# Patient Record
Sex: FEMALE | Race: WHITE | Employment: OTHER | URBAN - NONMETROPOLITAN AREA
[De-identification: names, ages, dates, MRNs, and addresses within clinical notes are randomized per-mention and may not be internally consistent; named-entity substitution may affect disease eponyms.]

---

## 2017-07-12 ENCOUNTER — HOSPITAL ENCOUNTER (OUTPATIENT)
Dept: LAB | Age: 72
Discharge: HOME | End: 2017-07-12
Attending: INTERNAL MEDICINE | Admitting: INTERNAL MEDICINE

## 2017-07-12 LAB
ANION GAP SERPL CALCULATED.3IONS-SCNC: 12 MMOL/L (ref 4–16)
BUN BLDV-MCNC: 10 MG/DL (ref 6–23)
CALCIUM SERPL-MCNC: 9.3 MG/DL (ref 8.3–10.6)
CHLORIDE BLD-SCNC: 103 MMOL/L (ref 99–110)
CHOLESTEROL, FASTING: 118 MG/DL
CO2: 26 MMOL/L (ref 21–32)
CREAT SERPL-MCNC: 0.7 MG/DL (ref 0.6–1.1)
ESTIMATED AVERAGE GLUCOSE: 126 MG/DL
GFR AFRICAN AMERICAN: >60 ML/MIN/1.73M2
GFR NON-AFRICAN AMERICAN: >60 ML/MIN/1.73M2
GLUCOSE FASTING: 131 MG/DL (ref 70–99)
HBA1C MFR BLD: 6 % (ref 4.2–6.3)
HDLC SERPL-MCNC: 49 MG/DL
LDL CHOLESTEROL DIRECT: 57 MG/DL
POTASSIUM SERPL-SCNC: 3.9 MMOL/L (ref 3.5–5.1)
SODIUM BLD-SCNC: 141 MMOL/L (ref 135–145)
TRIGLYCERIDE, FASTING: 147 MG/DL
TSH HIGH SENSITIVITY: 3.75 UIU/ML (ref 0.27–4.2)
URIC ACID: 6.5 MG/DL (ref 2.6–6)

## 2017-09-11 ENCOUNTER — OFFICE VISIT (OUTPATIENT)
Dept: FAMILY MEDICINE CLINIC | Age: 72
End: 2017-09-11

## 2017-09-11 VITALS
WEIGHT: 274 LBS | HEIGHT: 70 IN | SYSTOLIC BLOOD PRESSURE: 138 MMHG | OXYGEN SATURATION: 99 % | HEART RATE: 66 BPM | RESPIRATION RATE: 15 BRPM | BODY MASS INDEX: 39.22 KG/M2 | DIASTOLIC BLOOD PRESSURE: 84 MMHG

## 2017-09-11 DIAGNOSIS — E89.0 HISTORY OF THYROIDECTOMY, SUBTOTAL: ICD-10-CM

## 2017-09-11 DIAGNOSIS — Z11.59 NEED FOR HEPATITIS C SCREENING TEST: ICD-10-CM

## 2017-09-11 DIAGNOSIS — F33.41 RECURRENT MAJOR DEPRESSIVE DISORDER, IN PARTIAL REMISSION (HCC): ICD-10-CM

## 2017-09-11 DIAGNOSIS — Z23 NEED FOR PROPHYLACTIC VACCINATION AGAINST STREPTOCOCCUS PNEUMONIAE (PNEUMOCOCCUS): ICD-10-CM

## 2017-09-11 DIAGNOSIS — Z13.820 OSTEOPOROSIS SCREENING: ICD-10-CM

## 2017-09-11 DIAGNOSIS — I10 ESSENTIAL HYPERTENSION: Primary | ICD-10-CM

## 2017-09-11 DIAGNOSIS — Z86.010 HISTORY OF COLON POLYPS: ICD-10-CM

## 2017-09-11 DIAGNOSIS — E03.9 ACQUIRED HYPOTHYROIDISM: ICD-10-CM

## 2017-09-11 PROCEDURE — 90670 PCV13 VACCINE IM: CPT | Performed by: FAMILY MEDICINE

## 2017-09-11 PROCEDURE — G0009 ADMIN PNEUMOCOCCAL VACCINE: HCPCS | Performed by: FAMILY MEDICINE

## 2017-09-11 PROCEDURE — 99203 OFFICE O/P NEW LOW 30 MIN: CPT | Performed by: FAMILY MEDICINE

## 2017-09-11 RX ORDER — VENLAFAXINE HYDROCHLORIDE 75 MG/1
75 CAPSULE, EXTENDED RELEASE ORAL DAILY
COMMUNITY
End: 2017-10-12 | Stop reason: SDUPTHER

## 2017-09-11 RX ORDER — ACETAMINOPHEN 160 MG
2000 TABLET,DISINTEGRATING ORAL EVERY MORNING
COMMUNITY

## 2017-09-11 RX ORDER — LEVOTHYROXINE SODIUM 0.12 MG/1
125 TABLET ORAL DAILY
Qty: 90 TABLET | Refills: 3 | Status: SHIPPED | OUTPATIENT
Start: 2017-09-11 | End: 2018-09-14 | Stop reason: SDUPTHER

## 2017-09-11 ASSESSMENT — ENCOUNTER SYMPTOMS
VOMITING: 0
SHORTNESS OF BREATH: 0
SORE THROAT: 0
WHEEZING: 0
DIARRHEA: 0
SINUS PRESSURE: 0
COUGH: 0
ABDOMINAL PAIN: 0
BLOOD IN STOOL: 0
BACK PAIN: 0
NAUSEA: 0
CONSTIPATION: 0
CHEST TIGHTNESS: 0
RHINORRHEA: 0

## 2017-09-11 ASSESSMENT — PATIENT HEALTH QUESTIONNAIRE - PHQ9
SUM OF ALL RESPONSES TO PHQ QUESTIONS 1-9: 2
1. LITTLE INTEREST OR PLEASURE IN DOING THINGS: 1
SUM OF ALL RESPONSES TO PHQ9 QUESTIONS 1 & 2: 2
2. FEELING DOWN, DEPRESSED OR HOPELESS: 1

## 2017-10-05 DIAGNOSIS — Z78.0 POST-MENOPAUSAL: Primary | ICD-10-CM

## 2017-10-12 DIAGNOSIS — F33.41 RECURRENT MAJOR DEPRESSIVE DISORDER, IN PARTIAL REMISSION (HCC): ICD-10-CM

## 2017-10-13 RX ORDER — VENLAFAXINE HYDROCHLORIDE 75 MG/1
75 CAPSULE, EXTENDED RELEASE ORAL DAILY
Qty: 90 CAPSULE | Refills: 3 | Status: SHIPPED | OUTPATIENT
Start: 2017-10-13 | End: 2018-10-12 | Stop reason: SDUPTHER

## 2017-10-13 RX ORDER — CARVEDILOL PHOSPHATE 10 MG/1
12.5 CAPSULE, EXTENDED RELEASE ORAL 2 TIMES DAILY
Qty: 180 CAPSULE | Refills: 3 | Status: CANCELLED | OUTPATIENT
Start: 2017-10-13

## 2017-10-13 RX ORDER — CARVEDILOL 12.5 MG/1
12.5 TABLET ORAL 2 TIMES DAILY
Qty: 60 TABLET | Refills: 3 | Status: SHIPPED | OUTPATIENT
Start: 2017-10-13 | End: 2018-01-15 | Stop reason: SDUPTHER

## 2017-10-18 ENCOUNTER — HOSPITAL ENCOUNTER (OUTPATIENT)
Dept: MAMMOGRAPHY | Age: 72
Discharge: HOME | End: 2017-10-18
Attending: FAMILY MEDICINE | Admitting: FAMILY MEDICINE

## 2017-10-18 DIAGNOSIS — Z78.0 POST-MENOPAUSAL: ICD-10-CM

## 2017-10-18 DIAGNOSIS — Z78.0 ASYMPTOMATIC MENOPAUSAL STATE: ICD-10-CM

## 2017-12-28 RX ORDER — SIMVASTATIN 20 MG
20 TABLET ORAL NIGHTLY
Qty: 90 TABLET | Refills: 3 | Status: SHIPPED | OUTPATIENT
Start: 2017-12-28 | End: 2019-02-20 | Stop reason: SDUPTHER

## 2018-01-15 ENCOUNTER — OFFICE VISIT (OUTPATIENT)
Dept: FAMILY MEDICINE CLINIC | Age: 73
End: 2018-01-15

## 2018-01-15 VITALS
HEART RATE: 68 BPM | OXYGEN SATURATION: 98 % | BODY MASS INDEX: 38.65 KG/M2 | WEIGHT: 269.4 LBS | DIASTOLIC BLOOD PRESSURE: 80 MMHG | RESPIRATION RATE: 16 BRPM | SYSTOLIC BLOOD PRESSURE: 142 MMHG

## 2018-01-15 DIAGNOSIS — Z11.59 NEED FOR HEPATITIS C SCREENING TEST: ICD-10-CM

## 2018-01-15 DIAGNOSIS — F33.41 RECURRENT MAJOR DEPRESSIVE DISORDER, IN PARTIAL REMISSION (HCC): ICD-10-CM

## 2018-01-15 DIAGNOSIS — I10 ESSENTIAL HYPERTENSION: ICD-10-CM

## 2018-01-15 DIAGNOSIS — M54.50 CHRONIC BILATERAL LOW BACK PAIN WITHOUT SCIATICA: ICD-10-CM

## 2018-01-15 DIAGNOSIS — Z86.010 HISTORY OF COLON POLYPS: ICD-10-CM

## 2018-01-15 DIAGNOSIS — I10 ESSENTIAL HYPERTENSION: Primary | ICD-10-CM

## 2018-01-15 DIAGNOSIS — E89.0 HISTORY OF THYROIDECTOMY, SUBTOTAL: ICD-10-CM

## 2018-01-15 DIAGNOSIS — K59.1 FUNCTIONAL DIARRHEA: ICD-10-CM

## 2018-01-15 DIAGNOSIS — Z23 NEEDS FLU SHOT: ICD-10-CM

## 2018-01-15 DIAGNOSIS — Z12.39 BREAST CANCER SCREENING: ICD-10-CM

## 2018-01-15 DIAGNOSIS — G89.29 CHRONIC BILATERAL LOW BACK PAIN WITHOUT SCIATICA: ICD-10-CM

## 2018-01-15 DIAGNOSIS — E03.9 ACQUIRED HYPOTHYROIDISM: ICD-10-CM

## 2018-01-15 LAB
A/G RATIO: 1.8 (ref 1.1–2.2)
ALBUMIN SERPL-MCNC: 4.6 G/DL (ref 3.4–5)
ALP BLD-CCNC: 110 U/L (ref 40–129)
ALT SERPL-CCNC: 38 U/L (ref 10–40)
ANION GAP SERPL CALCULATED.3IONS-SCNC: 17 MMOL/L (ref 3–16)
AST SERPL-CCNC: 43 U/L (ref 15–37)
BILIRUB SERPL-MCNC: 0.6 MG/DL (ref 0–1)
BUN BLDV-MCNC: 12 MG/DL (ref 7–20)
CALCIUM SERPL-MCNC: 9.9 MG/DL (ref 8.3–10.6)
CHLORIDE BLD-SCNC: 102 MMOL/L (ref 99–110)
CO2: 27 MMOL/L (ref 21–32)
CREAT SERPL-MCNC: 0.6 MG/DL (ref 0.6–1.2)
GFR AFRICAN AMERICAN: >60
GFR NON-AFRICAN AMERICAN: >60
GLOBULIN: 2.5 G/DL
GLUCOSE BLD-MCNC: 113 MG/DL (ref 70–99)
POTASSIUM SERPL-SCNC: 4.5 MMOL/L (ref 3.5–5.1)
SODIUM BLD-SCNC: 146 MMOL/L (ref 136–145)
TOTAL PROTEIN: 7.1 G/DL (ref 6.4–8.2)

## 2018-01-15 PROCEDURE — 99214 OFFICE O/P EST MOD 30 MIN: CPT | Performed by: FAMILY MEDICINE

## 2018-01-15 PROCEDURE — 90686 IIV4 VACC NO PRSV 0.5 ML IM: CPT | Performed by: FAMILY MEDICINE

## 2018-01-15 PROCEDURE — G0008 ADMIN INFLUENZA VIRUS VAC: HCPCS | Performed by: FAMILY MEDICINE

## 2018-01-15 RX ORDER — BACLOFEN 10 MG/1
10 TABLET ORAL NIGHTLY PRN
Qty: 20 TABLET | Refills: 1 | Status: SHIPPED | OUTPATIENT
Start: 2018-01-15 | End: 2018-05-15 | Stop reason: SDUPTHER

## 2018-01-15 RX ORDER — CARVEDILOL 12.5 MG/1
12.5 TABLET ORAL 2 TIMES DAILY
Qty: 180 TABLET | Refills: 3 | Status: SHIPPED | OUTPATIENT
Start: 2018-01-15 | End: 2019-01-14 | Stop reason: SDUPTHER

## 2018-01-15 ASSESSMENT — ENCOUNTER SYMPTOMS
WHEEZING: 0
ABDOMINAL PAIN: 0
CHEST TIGHTNESS: 0
DIARRHEA: 1
BACK PAIN: 0
SINUS PRESSURE: 0
COUGH: 0
NAUSEA: 0
RHINORRHEA: 0
SHORTNESS OF BREATH: 0
CONSTIPATION: 0
SORE THROAT: 0
BLOOD IN STOOL: 0
VOMITING: 0

## 2018-01-16 LAB — HEPATITIS C ANTIBODY INTERPRETATION: NORMAL

## 2018-01-16 NOTE — ASSESSMENT & PLAN NOTE
BP (!) 142/80   Pulse 68   Resp 16   Wt 269 lb 6.4 oz (122.2 kg)   SpO2 98%   BMI 38.65 kg/m²   Discussed diet/exercise vs meds    The patient is asked to make an attempt to improve diet and exercise patterns to aid in medical management of this problem.

## 2018-01-16 NOTE — PROGRESS NOTES
immunocompromised state. Neurological: Negative for weakness, numbness and headaches. Hematological: Negative. Psychiatric/Behavioral: Negative. OBJECTIVE  PHYSICAL EXAM:    BP (!) 142/80   Pulse 68   Resp 16   Wt 269 lb 6.4 oz (122.2 kg)   SpO2 98%   BMI 38.65 kg/m²       Physical Exam   Constitutional: She is oriented to person, place, and time. She appears well-developed and well-nourished. HENT:   Head: Normocephalic and atraumatic. Right Ear: External ear normal.   Left Ear: External ear normal.   Nose: Nose normal.   Mouth/Throat: Oropharynx is clear and moist.   Eyes: Conjunctivae and EOM are normal. Pupils are equal, round, and reactive to light. Neck: Normal range of motion. Neck supple. No JVD present. No tracheal deviation present. No thyromegaly present. Cardiovascular: Normal rate, regular rhythm, normal heart sounds and intact distal pulses. Pulmonary/Chest: Effort normal and breath sounds normal. She has no wheezes. She has no rales. Abdominal: Soft. Bowel sounds are normal. She exhibits no mass. Musculoskeletal: Normal range of motion. She exhibits no edema. Lymphadenopathy:     She has no cervical adenopathy. Neurological: She is alert and oriented to person, place, and time. She has normal reflexes. Skin: Skin is warm and dry. No rash noted. Psychiatric: She has a normal mood and affect. Her behavior is normal. Judgment and thought content normal.       ASSESSMENT:    1. Essential hypertension    2. Acquired hypothyroidism    3. Recurrent major depressive disorder, in partial remission ([address]    4. Chronic bilateral low back pain without sciatica    5. History of colon polyps    6. Functional diarrhea    7. Breast cancer screening    8. Needs flu shot    9. Need for hepatitis C screening test    10. History of thyroidectomy, subtotal        PLAN:    Georges  was seen today for other and back pain.     Diagnoses and all orders for this visit:    Essential

## 2018-01-25 ENCOUNTER — TELEPHONE (OUTPATIENT)
Dept: GASTROENTEROLOGY | Age: 73
End: 2018-01-25

## 2018-01-25 ENCOUNTER — INITIAL CONSULT (OUTPATIENT)
Dept: GASTROENTEROLOGY | Age: 73
End: 2018-01-25

## 2018-01-25 VITALS
DIASTOLIC BLOOD PRESSURE: 86 MMHG | HEIGHT: 70 IN | OXYGEN SATURATION: 98 % | BODY MASS INDEX: 38.63 KG/M2 | HEART RATE: 64 BPM | SYSTOLIC BLOOD PRESSURE: 130 MMHG | WEIGHT: 269.8 LBS

## 2018-01-25 DIAGNOSIS — K59.1 FUNCTIONAL DIARRHEA: Primary | ICD-10-CM

## 2018-01-25 PROCEDURE — 99203 OFFICE O/P NEW LOW 30 MIN: CPT | Performed by: INTERNAL MEDICINE

## 2018-01-25 NOTE — PROGRESS NOTES
discharge    Labs:  CBC  @LASTLABOSUSHORT(WBC,WBCCOUNT,WBCFETAL,HGB,HCT,PLATELET,MCV)@     Glucose   Date Value Ref Range Status   01/15/2018 113 (H) 70 - 99 mg/dL Final     CO2   Date Value Ref Range Status   01/15/2018 27 21 - 32 mmol/L Final     BUN   Date Value Ref Range Status   01/15/2018 12 7 - 20 mg/dL Final     Lab Results   Component Value Date    ALT 38 01/15/2018    AST 43 01/15/2018     No results found for: AMYLASE  No results found for: LIPASE  No results found for: ESR  No components found for: CREACTIVEPR  No results found for: JUANJOSE No components found for: ANTISMA, ANTIMITO  No results found for: CEA  No components found for: OCCULTBLOOD, OCCBLDSINPOC, OCCULTBLOODS, OCCBLD1, OCCBLD2, OCCBLD3, OCCULTBLOOD  No results found for: IRON, FERRITIN  No results found for: HAV    Assessment     Assessment and Plan:    A 66-year-old  female patient who has a past medical history of obesity, Hypercholesterolemia, hypertension, a personal history for colon polyps, and a family history of colorectal cancer had come to my office to follow up her history of polyps and intermittent diarrhea. 1. Intermittent diarrhea that has already resolved over past 2 or 3 weeks was most likely secondary to IBS or diet-related diarrhea. Since the patient's diarrhea  Has resolved and her stool was formed, I do not need to do stool test for infectious diarrhea. However I would recommend a colonoscopy. 2.  History of polyps and a family history colorectal cancer, I recommend a colonoscopy    3. Obesity I recommend doing exercise every day and eating a healthy diet. 4.  Mild elevated liver function tests may be secondary to medication or fatty liver or viral hepatitis. At this time I recommend liver ultrasound and the viral hepatitis panel to r/o viral hepatitis.   He was born in [address] [place] [address], therefore she needed a screening Hep C test.    I spent more than 50 minutes to discuss the pathophysiology, etiology, left side

## 2018-01-31 ENCOUNTER — HOSPITAL ENCOUNTER (OUTPATIENT)
Dept: ULTRASOUND IMAGING | Age: 73
Discharge: HOME | End: 2018-01-31
Attending: INTERNAL MEDICINE | Admitting: INTERNAL MEDICINE

## 2018-01-31 ENCOUNTER — TELEPHONE (OUTPATIENT)
Dept: GASTROENTEROLOGY | Age: 73
End: 2018-01-31

## 2018-01-31 DIAGNOSIS — R79.89 ELEVATED LFTS: ICD-10-CM

## 2018-01-31 DIAGNOSIS — Z86.010 HISTORY OF COLON POLYPS: Primary | ICD-10-CM

## 2018-01-31 DIAGNOSIS — R79.89 LFT ELEVATION: ICD-10-CM

## 2018-01-31 DIAGNOSIS — K59.1 FUNCTIONAL DIARRHEA: ICD-10-CM

## 2018-01-31 DIAGNOSIS — B15.9 VIRAL HEPATITIS A WITHOUT HEPATIC COMA: ICD-10-CM

## 2018-01-31 LAB
ALBUMIN SERPL-MCNC: 4 GM/DL (ref 3.4–5)
ALP BLD-CCNC: 108 IU/L (ref 40–129)
ALT SERPL-CCNC: 42 U/L (ref 10–40)
ANION GAP SERPL CALCULATED.3IONS-SCNC: 12 MMOL/L (ref 4–16)
AST SERPL-CCNC: 42 IU/L (ref 15–37)
BASOPHILS ABSOLUTE: 0 K/CU MM
BASOPHILS RELATIVE PERCENT: 0 % (ref 0–1)
BILIRUB SERPL-MCNC: 0.6 MG/DL (ref 0–1)
BILIRUBIN DIRECT: 0.2 MG/DL (ref 0–0.3)
BUN BLDV-MCNC: 9 MG/DL (ref 6–23)
CALCIUM SERPL-MCNC: 9.3 MG/DL (ref 8.3–10.6)
CHLORIDE BLD-SCNC: 100 MMOL/L (ref 99–110)
CO2: 29 MMOL/L (ref 21–32)
CREAT SERPL-MCNC: 0.7 MG/DL (ref 0.6–1.1)
DIFFERENTIAL TYPE: ABNORMAL
EOSINOPHILS ABSOLUTE: 0 K/CU MM
EOSINOPHILS RELATIVE PERCENT: 0 % (ref 0–3)
GFR AFRICAN AMERICAN: >60 ML/MIN/1.73M2
GFR NON-AFRICAN AMERICAN: >60 ML/MIN/1.73M2
GLUCOSE FASTING: 122 MG/DL (ref 70–99)
HAV IGM SER IA-ACNC: ABNORMAL
HBV SURFACE AB TITR SER: <3.5 {TITER}
HCT VFR BLD CALC: 43.6 % (ref 37–47)
HEMOGLOBIN: 14.6 GM/DL (ref 12.5–16)
HEPATITIS B SURFACE ANTIGEN: NON REACTIVE
HEPATITIS C ANTIBODY: NON REACTIVE
IMMATURE NEUTROPHIL %: 0.2 % (ref 0–0.43)
LYMPHOCYTES ABSOLUTE: 1.8 K/CU MM
LYMPHOCYTES RELATIVE PERCENT: 34.5 % (ref 24–44)
MCH RBC QN AUTO: 32.4 PG (ref 27–31)
MCHC RBC AUTO-ENTMCNC: 33.5 % (ref 32–36)
MCV RBC AUTO: 96.9 FL (ref 78–100)
MONOCYTES ABSOLUTE: 0.5 K/CU MM
MONOCYTES RELATIVE PERCENT: 8.8 % (ref 0–4)
PDW BLD-RTO: 12.9 % (ref 11.7–14.9)
PLATELET # BLD: 249 K/CU MM (ref 140–440)
PMV BLD AUTO: 9.8 FL (ref 7.5–11.1)
POTASSIUM SERPL-SCNC: 4.1 MMOL/L (ref 3.5–5.1)
RBC # BLD: 4.5 M/CU MM (ref 4.2–5.4)
SEGMENTED NEUTROPHILS ABSOLUTE COUNT: 2.9 K/CU MM
SEGMENTED NEUTROPHILS RELATIVE PERCENT: 56.5 % (ref 36–66)
SODIUM BLD-SCNC: 141 MMOL/L (ref 135–145)
TOTAL IMMATURE NEUTOROPHIL: 0.01 K/CU MM
TOTAL PROTEIN: 7.1 GM/DL (ref 6.4–8.2)
WBC # BLD: 5.2 K/CU MM (ref 4–10.5)

## 2018-01-31 NOTE — TELEPHONE ENCOUNTER
Patient advised of results. Verbal understanding expressed. Patient is going to have labs drawn at [facility]. I will fax the orders now.

## 2018-02-01 ENCOUNTER — HOSPITAL ENCOUNTER (OUTPATIENT)
Dept: LAB | Age: 73
Discharge: HOME | End: 2018-02-01
Attending: INTERNAL MEDICINE | Admitting: INTERNAL MEDICINE

## 2018-02-01 LAB
FERRITIN: 149 NG/ML (ref 15–150)
GAMMA GLUTAMYL TRANSFERASE: 138 IU/L (ref 5–36)
HAV AB SERPL IA-ACNC: POSITIVE
HEPATITIS B CORE TOTAL ANTIBODY: NEGATIVE
HIV SCREEN: NON REACTIVE
INR BLD: 1.12 INDEX
IRON: 63 UG/DL (ref 37–145)
PCT TRANSFERRIN: 20 % (ref 10–44)
PROTHROMBIN TIME: 12.8 SECONDS (ref 10–14.3)
TOTAL IRON BINDING CAPACITY: 314 UG/DL (ref 250–450)
UNSATURATED IRON BINDING CAPACITY: 251 UG/DL (ref 110–370)

## 2018-02-01 NOTE — TELEPHONE ENCOUNTER
Patient returned our call. She did not wish to reschedule c-scope at this time (for 3 months). When everything else gets \"straigtened out\" she will call to reschedule c-scope. Pt was reminded of checking LFT's every 2 weeks. Patient expressed understanding and had no further questions.

## 2018-02-02 LAB
CERULOPLASMIN: 25
IMMUNOGLOBULIN A, SERUM: 6

## 2018-02-03 LAB
ANA TITER: NORMAL
ANTI-MITOCHON TITER: 2.2
ANTI-NUCLEAR ANTIBODY (ANA): DETECTED
F-ACTIN AB, IGG: 18

## 2018-02-04 LAB
ALPHA-1 ANTITRYPSIN PHENOTYPE: NORMAL
ALPHA-1 ANTITRYPSIN: 151

## 2018-02-09 ENCOUNTER — TELEPHONE (OUTPATIENT)
Dept: GASTROENTEROLOGY | Age: 73
End: 2018-02-09

## 2018-02-09 NOTE — TELEPHONE ENCOUNTER
Patient called to ask if we faxed the standing order fro LFT's every 2 weeks to [facility]. I advised her that we have not faxed it yet. Jennifer Mehta can you please place standing order for patient to have LFT's checked every 2 weeks?

## 2018-02-14 ENCOUNTER — HOSPITAL ENCOUNTER (OUTPATIENT)
Dept: LAB | Age: 73
Discharge: HOME | End: 2018-02-28
Attending: INTERNAL MEDICINE | Admitting: INTERNAL MEDICINE

## 2018-02-14 LAB
ALBUMIN SERPL-MCNC: 4.1 GM/DL (ref 3.4–5)
ALP BLD-CCNC: 114 IU/L (ref 40–129)
ALT SERPL-CCNC: 47 U/L (ref 10–40)
AST SERPL-CCNC: 50 IU/L (ref 15–37)
BILIRUB SERPL-MCNC: 0.6 MG/DL (ref 0–1)
BILIRUBIN DIRECT: 0.2 MG/DL (ref 0–0.3)
BILIRUBIN, INDIRECT: 0.4 MG/DL (ref 0–0.7)
TOTAL PROTEIN: 7.4 GM/DL (ref 6.4–8.2)

## 2018-02-21 ENCOUNTER — TELEPHONE (OUTPATIENT)
Dept: GASTROENTEROLOGY | Age: 73
End: 2018-02-21

## 2018-02-28 LAB
ALBUMIN SERPL-MCNC: 4.1 GM/DL (ref 3.4–5)
ALP BLD-CCNC: 113 IU/L (ref 40–129)
ALT SERPL-CCNC: 47 U/L (ref 10–40)
AST SERPL-CCNC: 48 IU/L (ref 15–37)
BILIRUB SERPL-MCNC: 0.6 MG/DL (ref 0–1)
BILIRUBIN DIRECT: 0.2 MG/DL (ref 0–0.3)
BILIRUBIN, INDIRECT: 0.4 MG/DL (ref 0–0.7)
TOTAL PROTEIN: 7.3 GM/DL (ref 6.4–8.2)

## 2018-03-01 ENCOUNTER — HOSPITAL ENCOUNTER (OUTPATIENT)
Dept: LAB | Age: 73
Discharge: HOME | End: 2018-03-31
Attending: INTERNAL MEDICINE | Admitting: INTERNAL MEDICINE

## 2018-03-14 LAB
ALBUMIN SERPL-MCNC: 4.3 GM/DL (ref 3.4–5)
ALP BLD-CCNC: 106 IU/L (ref 40–129)
ALT SERPL-CCNC: 33 U/L (ref 10–40)
AST SERPL-CCNC: 40 IU/L (ref 15–37)
BILIRUB SERPL-MCNC: 0.7 MG/DL (ref 0–1)
BILIRUBIN DIRECT: 0.2 MG/DL (ref 0–0.3)
BILIRUBIN, INDIRECT: 0.5 MG/DL (ref 0–0.7)
TOTAL PROTEIN: 7.6 GM/DL (ref 6.4–8.2)

## 2018-03-28 LAB
ALBUMIN SERPL-MCNC: 4.2 GM/DL (ref 3.4–5)
ALP BLD-CCNC: 104 IU/L (ref 40–129)
ALT SERPL-CCNC: 32 U/L (ref 10–40)
AST SERPL-CCNC: 39 IU/L (ref 15–37)
BILIRUB SERPL-MCNC: 0.6 MG/DL (ref 0–1)
BILIRUBIN DIRECT: 0.2 MG/DL (ref 0–0.3)
BILIRUBIN, INDIRECT: 0.4 MG/DL (ref 0–0.7)
TOTAL PROTEIN: 7.5 GM/DL (ref 6.4–8.2)

## 2018-04-01 ENCOUNTER — HOSPITAL ENCOUNTER (OUTPATIENT)
Dept: LAB | Age: 73
Discharge: HOME | End: 2018-04-30
Attending: INTERNAL MEDICINE | Admitting: INTERNAL MEDICINE

## 2018-04-18 ENCOUNTER — TELEPHONE (OUTPATIENT)
Dept: GASTROENTEROLOGY | Age: 73
End: 2018-04-18

## 2018-04-18 DIAGNOSIS — R79.89 LFT ELEVATION: Primary | ICD-10-CM

## 2018-04-18 DIAGNOSIS — B15.9 VIRAL HEPATITIS A WITHOUT HEPATIC COMA: ICD-10-CM

## 2018-04-18 LAB
ALBUMIN SERPL-MCNC: 4 GM/DL (ref 3.4–5)
ALP BLD-CCNC: 124 IU/L (ref 40–129)
ALT SERPL-CCNC: 48 U/L (ref 10–40)
AST SERPL-CCNC: 53 IU/L (ref 15–37)
BILIRUB SERPL-MCNC: 0.5 MG/DL (ref 0–1)
BILIRUBIN DIRECT: 0.2 MG/DL (ref 0–0.3)
BILIRUBIN, INDIRECT: 0.3 MG/DL (ref 0–0.7)
TOTAL PROTEIN: 7 GM/DL (ref 6.4–8.2)

## 2018-04-19 ENCOUNTER — HOSPITAL ENCOUNTER (OUTPATIENT)
Dept: LAB | Age: 73
Discharge: HOME | End: 2018-04-19
Attending: INTERNAL MEDICINE | Admitting: INTERNAL MEDICINE

## 2018-04-19 LAB — HAV IGM SER IA-ACNC: ABNORMAL

## 2018-04-21 LAB
HAV AB SERPL IA-ACNC: POSITIVE
HEPATITIS E IGM AB: NEGATIVE

## 2018-05-01 ENCOUNTER — HOSPITAL ENCOUNTER (OUTPATIENT)
Dept: LAB | Age: 73
Discharge: HOME | End: 2018-05-31
Attending: INTERNAL MEDICINE | Admitting: INTERNAL MEDICINE

## 2018-05-09 LAB
ALBUMIN SERPL-MCNC: 4.1 GM/DL (ref 3.4–5)
ALP BLD-CCNC: 114 IU/L (ref 40–129)
ALT SERPL-CCNC: 41 U/L (ref 10–40)
AST SERPL-CCNC: 46 IU/L (ref 15–37)
BILIRUB SERPL-MCNC: 0.6 MG/DL (ref 0–1)
BILIRUBIN DIRECT: 0.2 MG/DL (ref 0–0.3)
BILIRUBIN, INDIRECT: 0.4 MG/DL (ref 0–0.7)
TOTAL PROTEIN: 7.4 GM/DL (ref 6.4–8.2)

## 2018-05-15 ENCOUNTER — OFFICE VISIT (OUTPATIENT)
Dept: FAMILY MEDICINE CLINIC | Age: 73
End: 2018-05-15

## 2018-05-15 VITALS
SYSTOLIC BLOOD PRESSURE: 130 MMHG | RESPIRATION RATE: 18 BRPM | DIASTOLIC BLOOD PRESSURE: 76 MMHG | HEART RATE: 70 BPM | WEIGHT: 267 LBS | BODY MASS INDEX: 38.31 KG/M2

## 2018-05-15 DIAGNOSIS — I10 ESSENTIAL HYPERTENSION: ICD-10-CM

## 2018-05-15 DIAGNOSIS — R79.89 LFT ELEVATION: ICD-10-CM

## 2018-05-15 DIAGNOSIS — Z12.11 SCREENING FOR COLON CANCER: ICD-10-CM

## 2018-05-15 DIAGNOSIS — E03.9 ACQUIRED HYPOTHYROIDISM: ICD-10-CM

## 2018-05-15 DIAGNOSIS — B15.9 VIRAL HEPATITIS A WITHOUT HEPATIC COMA: Primary | ICD-10-CM

## 2018-05-15 DIAGNOSIS — R11.0 NAUSEA: ICD-10-CM

## 2018-05-15 DIAGNOSIS — G89.29 CHRONIC BILATERAL LOW BACK PAIN WITHOUT SCIATICA: ICD-10-CM

## 2018-05-15 DIAGNOSIS — F33.41 RECURRENT MAJOR DEPRESSIVE DISORDER, IN PARTIAL REMISSION (HCC): ICD-10-CM

## 2018-05-15 DIAGNOSIS — Z23 NEED FOR PROPHYLACTIC VACCINATION AND INOCULATION AGAINST VARICELLA: ICD-10-CM

## 2018-05-15 DIAGNOSIS — Z12.31 ENCOUNTER FOR SCREENING MAMMOGRAM FOR BREAST CANCER: ICD-10-CM

## 2018-05-15 DIAGNOSIS — M54.50 CHRONIC BILATERAL LOW BACK PAIN WITHOUT SCIATICA: ICD-10-CM

## 2018-05-15 PROCEDURE — 99214 OFFICE O/P EST MOD 30 MIN: CPT | Performed by: FAMILY MEDICINE

## 2018-05-15 RX ORDER — ONDANSETRON HYDROCHLORIDE 8 MG/1
8 TABLET, FILM COATED ORAL EVERY 8 HOURS PRN
Qty: 30 TABLET | Refills: 1 | Status: SHIPPED | OUTPATIENT
Start: 2018-05-15 | End: 2018-09-06

## 2018-05-15 RX ORDER — BACLOFEN 10 MG/1
10 TABLET ORAL NIGHTLY PRN
Qty: 20 TABLET | Refills: 1 | Status: SHIPPED | OUTPATIENT
Start: 2018-05-15 | End: 2021-02-09 | Stop reason: SDUPTHER

## 2018-05-16 ENCOUNTER — PATIENT MESSAGE (OUTPATIENT)
Dept: FAMILY MEDICINE CLINIC | Age: 73
End: 2018-05-16

## 2018-05-18 ASSESSMENT — ENCOUNTER SYMPTOMS
RESPIRATORY NEGATIVE: 1
SHORTNESS OF BREATH: 0
EYES NEGATIVE: 1
VOMITING: 1
NAUSEA: 1
HEARTBURN: 0
CONSTIPATION: 0
COUGH: 0
ORTHOPNEA: 0
BLOOD IN STOOL: 0
HEMOPTYSIS: 0
ABDOMINAL PAIN: 1
DIARRHEA: 1

## 2018-05-21 ENCOUNTER — TELEPHONE (OUTPATIENT)
Dept: FAMILY MEDICINE CLINIC | Age: 73
End: 2018-05-21

## 2018-05-30 ENCOUNTER — TELEPHONE (OUTPATIENT)
Dept: GASTROENTEROLOGY | Age: 73
End: 2018-05-30

## 2018-05-30 ENCOUNTER — HOSPITAL ENCOUNTER (OUTPATIENT)
Dept: MAMMOGRAPHY | Age: 73
Discharge: HOME | End: 2018-05-30
Attending: FAMILY MEDICINE | Admitting: FAMILY MEDICINE

## 2018-05-30 DIAGNOSIS — Z12.31 ENCOUNTER FOR SCREENING MAMMOGRAM FOR BREAST CANCER: ICD-10-CM

## 2018-05-30 LAB
ALBUMIN SERPL-MCNC: 4.5 GM/DL (ref 3.4–5)
ALP BLD-CCNC: 107 IU/L (ref 40–129)
ALT SERPL-CCNC: 36 U/L (ref 10–40)
AST SERPL-CCNC: 46 IU/L (ref 15–37)
BILIRUB SERPL-MCNC: 0.8 MG/DL (ref 0–1)
BILIRUBIN DIRECT: 0.2 MG/DL (ref 0–0.3)
BILIRUBIN, INDIRECT: 0.6 MG/DL (ref 0–0.7)
TOTAL PROTEIN: 7.7 GM/DL (ref 6.4–8.2)

## 2018-06-01 ENCOUNTER — HOSPITAL ENCOUNTER (OUTPATIENT)
Dept: LAB | Age: 73
Discharge: HOME | End: 2018-06-30
Attending: INTERNAL MEDICINE | Admitting: INTERNAL MEDICINE

## 2018-06-13 ENCOUNTER — HOSPITAL ENCOUNTER (OUTPATIENT)
Dept: WOMENS IMAGING | Age: 73
Discharge: HOME | End: 2018-06-13
Attending: FAMILY MEDICINE | Admitting: FAMILY MEDICINE

## 2018-06-13 DIAGNOSIS — N64.89 BREAST ASYMMETRY: ICD-10-CM

## 2018-06-13 DIAGNOSIS — R92.8 ABNORMAL MAMMOGRAM: ICD-10-CM

## 2018-06-14 DIAGNOSIS — N63.0 BREAST MASS SEEN ON MAMMOGRAM: Primary | ICD-10-CM

## 2018-06-14 DIAGNOSIS — R92.8 OTHER ABNORMAL AND INCONCLUSIVE FINDINGS ON DIAGNOSTIC IMAGING OF BREAST: ICD-10-CM

## 2018-06-26 LAB
ALBUMIN SERPL-MCNC: 4.2 GM/DL (ref 3.4–5)
ALP BLD-CCNC: 104 IU/L (ref 40–129)
ALT SERPL-CCNC: 42 U/L (ref 10–40)
AST SERPL-CCNC: 46 IU/L (ref 15–37)
BILIRUB SERPL-MCNC: 0.6 MG/DL (ref 0–1)
BILIRUBIN DIRECT: 0.2 MG/DL (ref 0–0.3)
BILIRUBIN, INDIRECT: 0.4 MG/DL (ref 0–0.7)
TOTAL PROTEIN: 7.2 GM/DL (ref 6.4–8.2)

## 2018-07-01 ENCOUNTER — HOSPITAL ENCOUNTER (OUTPATIENT)
Dept: LAB | Age: 73
Discharge: HOME | End: 2018-07-31
Attending: INTERNAL MEDICINE | Admitting: INTERNAL MEDICINE

## 2018-07-24 ENCOUNTER — TELEPHONE (OUTPATIENT)
Dept: GASTROENTEROLOGY | Age: 73
End: 2018-07-24

## 2018-07-24 DIAGNOSIS — B15.9 VIRAL HEPATITIS A WITHOUT HEPATIC COMA: Primary | ICD-10-CM

## 2018-07-24 LAB
ALBUMIN SERPL-MCNC: 4.2 GM/DL (ref 3.4–5)
ALP BLD-CCNC: 105 IU/L (ref 40–129)
ALT SERPL-CCNC: 45 U/L (ref 10–40)
AST SERPL-CCNC: 45 IU/L (ref 15–37)
BILIRUB SERPL-MCNC: 0.6 MG/DL (ref 0–1)
BILIRUBIN DIRECT: 0.2 MG/DL (ref 0–0.3)
BILIRUBIN, INDIRECT: 0.4 MG/DL (ref 0–0.7)
TOTAL PROTEIN: 7.4 GM/DL (ref 6.4–8.2)

## 2018-07-24 NOTE — TELEPHONE ENCOUNTER
Please let her know that her LFT were stable although it was till elevated. But I think that her Hep A should have recover already. She likely had underlying fatty liver that causes her slightly abnormal LFT now. I would suggest monitor her LFT every month for the next 6 months. If they are stable we only need to check her LFT every 3 months.

## 2018-08-01 ENCOUNTER — HOSPITAL ENCOUNTER (OUTPATIENT)
Dept: LAB | Age: 73
Discharge: HOME | End: 2018-08-31
Attending: INTERNAL MEDICINE | Admitting: INTERNAL MEDICINE

## 2018-08-20 RX ORDER — SODIUM CHLORIDE, SODIUM LACTATE, POTASSIUM CHLORIDE, CALCIUM CHLORIDE 600; 310; 30; 20 MG/100ML; MG/100ML; MG/100ML; MG/100ML
INJECTION, SOLUTION INTRAVENOUS CONTINUOUS
Status: CANCELLED | OUTPATIENT
Start: 2018-08-20

## 2018-08-22 LAB
ALBUMIN SERPL-MCNC: 4.1 GM/DL (ref 3.4–5)
ALP BLD-CCNC: 99 IU/L (ref 40–129)
ALT SERPL-CCNC: 36 U/L (ref 10–40)
AST SERPL-CCNC: 48 IU/L (ref 15–37)
BILIRUB SERPL-MCNC: 0.7 MG/DL (ref 0–1)
BILIRUBIN DIRECT: 0.2 MG/DL (ref 0–0.3)
BILIRUBIN, INDIRECT: 0.5 MG/DL (ref 0–0.7)
TOTAL PROTEIN: 7.3 GM/DL (ref 6.4–8.2)

## 2018-08-27 ENCOUNTER — TELEPHONE (OUTPATIENT)
Dept: GASTROENTEROLOGY | Age: 73
End: 2018-08-27

## 2018-08-27 DIAGNOSIS — R74.8 ELEVATED LIVER ENZYMES: Primary | ICD-10-CM

## 2018-09-01 ENCOUNTER — HOSPITAL ENCOUNTER (OUTPATIENT)
Dept: LAB | Age: 73
Discharge: HOME | End: 2018-09-01
Attending: INTERNAL MEDICINE | Admitting: INTERNAL MEDICINE

## 2018-09-04 ENCOUNTER — TELEPHONE (OUTPATIENT)
Dept: GASTROENTEROLOGY | Age: 73
End: 2018-09-04

## 2018-09-04 NOTE — TELEPHONE ENCOUNTER
Called pt to confirm procedure appt on Thursday, 18. Pt stated she needed to reschedule because her mother-in-law passed away and the  is on Thursday. Pt has been rescheduled to 18 at 8:30 AM, arrival time 7:30 AM. Faxed scheduling sheet to [facility].

## 2018-09-06 ENCOUNTER — HOSPITAL ENCOUNTER (OUTPATIENT)
Dept: SURGERY | Age: 73
Discharge: HOME | End: 2018-09-06
Attending: INTERNAL MEDICINE | Admitting: INTERNAL MEDICINE

## 2018-09-06 ENCOUNTER — PAT TELEPHONE (OUTPATIENT)
Dept: SURGERY | Age: 73
End: 2018-09-06

## 2018-09-06 VITALS — WEIGHT: 250 LBS | BODY MASS INDEX: 35.79 KG/M2 | HEIGHT: 70 IN

## 2018-09-06 NOTE — PROGRESS NOTES
1. Hx of anesthesia complications? -- no  2. Hx of difficult airway/intubation? -- no  3. Hx of changed chest pain/CHF exacerbation in last 6 mo. ? -- no  4. Home O2 dependent? -- no  5. Able to climb one flight of stairs w/o SOB? -- yes  6. Recent COPD exacerbation or pneumonia/bronchitis that has been treated in last      month? -- no       1. Do not eat or drink anything after 12 midnight (or____hours) unless instructed by your doctor prior to surgery. This includes no water, chewing gum or mints. 2. Follow your directions as prescribed by the doctor for your procedure and medications. 3.Check with your Doctor regarding stopping Plavix, Coumadin, Lovenox,Effient,Pradaxa,Xarelto, Fragmin or other blood thinners and follow their instructions. 4. Do not smoke, and do not drink any alcoholic beverages 24 hours prior to surgery. This includes NA Beer. 5. You may brush your teeth and gargle the morning of surgery. DO NOT SWALLOW WATER   6. You MUST make arrangements for a responsible adult to take you home after your surgery and be able to check on you every couple hours for the day. You will not be allowed to leave alone or drive yourself home. It is strongly suggested someone stay with you the first 24 hrs. Your surgery will be cancelled if you do not have a ride home. 7. Please wear simple, loose fitting clothing to the hospital.  [place] not bring valuables (money, credit cards, checkbooks, etc.) Do not wear any makeup (including no eye makeup) or nail polish on your fingers or toes. 8. DO NOT wear any jewelry or piercings on day of surgery. All body piercing jewelry must be removed. 9. If you have dentures, they will be removed before going to the OR; we will provide you a container. If you wear contact lenses or glasses, they will be removed; please bring a case for them.              10. If you  have a Living Will and Durable Power of  for Healthcare, please bring in a

## 2018-09-07 ENCOUNTER — TELEPHONE (OUTPATIENT)
Dept: GASTROENTEROLOGY | Age: 73
End: 2018-09-07

## 2018-09-11 ENCOUNTER — HOSPITAL ENCOUNTER (OUTPATIENT)
Dept: SURGERY | Age: 73
Discharge: HOME | End: 2018-09-11
Attending: INTERNAL MEDICINE | Admitting: INTERNAL MEDICINE

## 2018-09-11 ENCOUNTER — TELEPHONE (OUTPATIENT)
Dept: GASTROENTEROLOGY | Age: 73
End: 2018-09-11

## 2018-09-11 VITALS
DIASTOLIC BLOOD PRESSURE: 81 MMHG | HEIGHT: 70 IN | RESPIRATION RATE: 16 BRPM | TEMPERATURE: 97.1 F | WEIGHT: 264 LBS | SYSTOLIC BLOOD PRESSURE: 146 MMHG | OXYGEN SATURATION: 99 % | BODY MASS INDEX: 37.8 KG/M2 | HEART RATE: 62 BPM

## 2018-09-11 DIAGNOSIS — B15.9 VIRAL HEPATITIS A WITHOUT HEPATIC COMA: Primary | ICD-10-CM

## 2018-09-11 DIAGNOSIS — K63.5 POLYP OF COLON, UNSPECIFIED PART OF COLON, UNSPECIFIED TYPE: ICD-10-CM

## 2018-09-11 DIAGNOSIS — R76.8 ANA POSITIVE: Primary | ICD-10-CM

## 2018-09-11 PROCEDURE — 45385 COLONOSCOPY W/LESION REMOVAL: CPT | Performed by: INTERNAL MEDICINE

## 2018-09-11 RX ORDER — SODIUM CHLORIDE, SODIUM LACTATE, POTASSIUM CHLORIDE, CALCIUM CHLORIDE 600; 310; 30; 20 MG/100ML; MG/100ML; MG/100ML; MG/100ML
INJECTION, SOLUTION INTRAVENOUS CONTINUOUS
Status: DISCONTINUED | OUTPATIENT
Start: 2018-09-11 | End: 2018-09-12 | Stop reason: HOSPADM

## 2018-09-11 RX ADMIN — SODIUM CHLORIDE, SODIUM LACTATE, POTASSIUM CHLORIDE, CALCIUM CHLORIDE: 600; 310; 30; 20 INJECTION, SOLUTION INTRAVENOUS at 07:58

## 2018-09-11 ASSESSMENT — PAIN - FUNCTIONAL ASSESSMENT: PAIN_FUNCTIONAL_ASSESSMENT: 0-10

## 2018-09-11 ASSESSMENT — PAIN DESCRIPTION - DESCRIPTORS: DESCRIPTORS: SORE

## 2018-09-11 ASSESSMENT — PAIN SCALES - GENERAL
PAINLEVEL_OUTOF10: 0
PAINLEVEL_OUTOF10: 0

## 2018-09-11 NOTE — ANESTHESIA POST-OP
Anesthesia Post-op Note    Patient: Tre Steel  MRN: [id number]  YOB: 1945  Date of evaluation: 9/11/2018  Time:  9:09 AM     Procedure(s) Performed:     Last Vitals: BP (!) 161/87   Pulse 74   Temp 97 °F (36.1 °C) (Temporal)   Resp 16   Ht 5' 10\" (1.778 m)   Wt 264 lb (119.7 kg)   SpO2 96%   BMI 37.88 kg/m²     Dipti Phase I:      Dipti Phase II:      Anesthesia Post Evaluation    Final anesthesia type: MAC  Patient location during evaluation: outpatient unit  Patient participation: complete - patient participated  Level of consciousness: awake and alert  Pain score: 1  Airway patency: patent  Nausea & Vomiting: no nausea  Complications: no  Cardiovascular status: hemodynamically stable  Respiratory status: acceptable  Hydration status: euvolemic        John Rider MD  9:09 AM

## 2018-09-11 NOTE — TELEPHONE ENCOUNTER
Patient was given lab orders while at [facility] today. appt scheduled on 10/2/18 at 1140 with Rosa Maria Bustillos.  Patient was notified of the referral.

## 2018-09-11 NOTE — PROGRESS NOTES
BRIEF COLONOSCOPY REPORT:    Impression:    1) A small polyp was removed    Suggest:   1) repeat it in 5 years     The complete operative report (including photos) is available in the following locations:   1) soft chart now   2) report will also be scanned and can then be found by going to \"chart review\" then \"notes\" then \"op report\" or by going to \"chart review\" then \"media\" then \"op report\". For review of photos, may need to go to page 2.

## 2018-09-11 NOTE — TELEPHONE ENCOUNTER
----- Message from Toya Montero MD sent at 9/11/2018  8:28 AM EDT -----  Please call her to make a routine follow up appointment with Virgen Chan for Hep A and fatty liver and elevation of LFT    In addition, I also refer her to a rheumatologist for elevation of JUANJOSE.     bg

## 2018-09-11 NOTE — H&P
Allergies: Allergies   Allergen Reactions    Nsaids Shortness Of Breath     Wheeze       Social History:    Social History     Social History    Marital status:      Spouse name: N/A    Number of children: N/A    Years of education: N/A     Occupational History    Not on file. Social History Main Topics    Smoking status: Former Smoker     Packs/day: 1.50     Years: 15.00     Types: Cigarettes     Quit date: 10/1/1970    Smokeless tobacco: Never Used    Alcohol use Yes      Comment: rarely    Drug use: No    Sexual activity: Not Currently     Other Topics Concern    Not on file     Social History Narrative    No narrative on file       Family History:    Family History   Problem Relation Age of Onset   Yajaira Sanchez Breast Cancer Mother     Colon Cancer Mother    Yajaira Sanchez Glaucoma Mother     Kidney Cancer Father     Kidney Disease Father     Glaucoma Maternal Grandmother          Review of Systems:  Constitutional: No weight loss, no fevers. Eyes: No problems with vision. ENT: No nose or sinus problems, no oral problems, no throat problems or hoarseness. Cardiovascular: No chest pain, no leg pain with walking, no palpitations, no ankle swelling. Respiratory: No shortness of breath, no persistent cough, no wheezing. Endocrine: No increased thirst, no increased urination. Gastrointestinal: No heartburn, no dysphagia, no abdominal pain, no loss of appetite, no nausea or vomiting, no diarrhea, no constipation, no melena, no hematochezia, no sceleral icterus or jaundice. Skin: No rashes. Musculoskeletal: No trouble walking or standing, no joint pain, no muscle pain. Allergy/Immune System: No allergies, no frequent infections. Neurological: No memory difficulties, no temporary blindness, no difficulty speaking, no headaches, no numbness. Psychiatric: No depression, no suicidal ideation, no auditory hallucinations.   Hematological/Lymphatic: No lymphadenopathy, no frequent nose bleeds, no easy bruising. Genitourinary: No penile/vaginal discharge, no pain with urination, no trouble starting urinary stream, no hematuria. Physical Examination  Vital Signs: BP (!) 161/87   Pulse 74   Temp 97 °F (36.1 °C) (Temporal)   Resp 16   Ht 5' 10\" (1.778 m)   Wt 264 lb (119.7 kg)   SpO2 96%   BMI 37.88 kg/m²  Body mass index is 37.88 kg/m². General: The patient is a 67 y.o. female in No acute distress. EYE: EOMI, Gross visual field was normal. Pupils reactive, The conjunctive was normal, with no erythema. ENT: no lymphadenopathy, oropharynx is without erythema, edema, or exudates, and moist mucus membranes. The nasal mucosa, septum and turbinates were normal without inflammation or edema. Neck: There was no mass on palpitation, tracheal position was in the middle of the neck and there was no enlarged thyroid. There was no JVD. Lungs: The respiratory was not in labor and the patient did not use accessory muscle. Clear to auscultation bilaterally, no wheeze/crackles. Cardiovascular: Regular rate and rhythm, normal S1 & S2, no murmurs, rubs or gallops appreciated. Peripheral pulses were normal and no tenderness. Abdomen: Soft, non-tender, no rebound or guarding or peritoneal features, no masses, no hepatosplenomegaly. Extremities: upper and lower extremities were warm and dry, no clubbing, cyanosis, edema. Neuro: CN II-XII were intact grossly. Sensation was normal on all extremities and the muscle strength was normal and symmetry. Rectum:  There was no fistular, fissure, external hemorrhoid, tenderness, abscess, erythema or discharge    Labs:  CBC  WBC   Date Value Ref Range Status   01/31/2018 5.2 4.0 - 10.5 K/CU MM Final     Hemoglobin   Date Value Ref Range Status   01/31/2018 14.6 12.5 - 16.0 GM/DL Final     Hematocrit   Date Value Ref Range Status   01/31/2018 43.6 37 - 47 % Final     MCV   Date Value Ref Range Status   01/31/2018 96.9 78 - 100 FL Final        Glucose   Date Value Ref Range

## 2018-09-12 ENCOUNTER — TELEPHONE (OUTPATIENT)
Dept: GASTROENTEROLOGY | Age: 73
End: 2018-09-12

## 2018-09-12 DIAGNOSIS — B15.9 VIRAL HEPATITIS A WITHOUT HEPATIC COMA: Primary | ICD-10-CM

## 2018-09-14 DIAGNOSIS — E03.9 ACQUIRED HYPOTHYROIDISM: ICD-10-CM

## 2018-09-14 RX ORDER — LEVOTHYROXINE SODIUM 0.12 MG/1
TABLET ORAL
Qty: 90 TABLET | Refills: 2 | Status: SHIPPED | OUTPATIENT
Start: 2018-09-14 | End: 2018-12-10 | Stop reason: SDUPTHER

## 2018-09-18 ENCOUNTER — OFFICE VISIT (OUTPATIENT)
Dept: FAMILY MEDICINE CLINIC | Age: 73
End: 2018-09-18

## 2018-09-18 VITALS
WEIGHT: 268.2 LBS | BODY MASS INDEX: 38.39 KG/M2 | HEIGHT: 70 IN | DIASTOLIC BLOOD PRESSURE: 84 MMHG | RESPIRATION RATE: 16 BRPM | SYSTOLIC BLOOD PRESSURE: 136 MMHG | HEART RATE: 60 BPM

## 2018-09-18 DIAGNOSIS — E03.9 ACQUIRED HYPOTHYROIDISM: ICD-10-CM

## 2018-09-18 DIAGNOSIS — Z23 NEED FOR 23-POLYVALENT PNEUMOCOCCAL POLYSACCHARIDE VACCINE: ICD-10-CM

## 2018-09-18 DIAGNOSIS — E89.0 HISTORY OF THYROIDECTOMY, SUBTOTAL: ICD-10-CM

## 2018-09-18 DIAGNOSIS — M54.50 CHRONIC BILATERAL LOW BACK PAIN WITHOUT SCIATICA: ICD-10-CM

## 2018-09-18 DIAGNOSIS — R76.8 ANA POSITIVE: ICD-10-CM

## 2018-09-18 DIAGNOSIS — F33.41 RECURRENT MAJOR DEPRESSIVE DISORDER, IN PARTIAL REMISSION (HCC): ICD-10-CM

## 2018-09-18 DIAGNOSIS — Z23 NEEDS FLU SHOT: ICD-10-CM

## 2018-09-18 DIAGNOSIS — B15.9 VIRAL HEPATITIS A WITHOUT HEPATIC COMA: ICD-10-CM

## 2018-09-18 DIAGNOSIS — I10 ESSENTIAL HYPERTENSION: Primary | ICD-10-CM

## 2018-09-18 DIAGNOSIS — G89.29 CHRONIC BILATERAL LOW BACK PAIN WITHOUT SCIATICA: ICD-10-CM

## 2018-09-18 PROBLEM — R79.89 LFT ELEVATION: Status: RESOLVED | Noted: 2018-01-31 | Resolved: 2018-09-18

## 2018-09-18 PROBLEM — K59.1 FUNCTIONAL DIARRHEA: Status: RESOLVED | Noted: 2018-01-15 | Resolved: 2018-09-18

## 2018-09-18 PROCEDURE — 90732 PPSV23 VACC 2 YRS+ SUBQ/IM: CPT | Performed by: FAMILY MEDICINE

## 2018-09-18 PROCEDURE — G0008 ADMIN INFLUENZA VIRUS VAC: HCPCS | Performed by: FAMILY MEDICINE

## 2018-09-18 PROCEDURE — G0009 ADMIN PNEUMOCOCCAL VACCINE: HCPCS | Performed by: FAMILY MEDICINE

## 2018-09-18 PROCEDURE — 99214 OFFICE O/P EST MOD 30 MIN: CPT | Performed by: FAMILY MEDICINE

## 2018-09-18 PROCEDURE — 90688 IIV4 VACCINE SPLT 0.5 ML IM: CPT | Performed by: FAMILY MEDICINE

## 2018-09-18 ASSESSMENT — ENCOUNTER SYMPTOMS: NAUSEA: 1

## 2018-09-19 ENCOUNTER — HOSPITAL ENCOUNTER (OUTPATIENT)
Dept: LAB | Age: 73
Discharge: HOME | End: 2018-09-19
Attending: INTERNAL MEDICINE | Admitting: INTERNAL MEDICINE

## 2018-09-19 LAB
ALBUMIN SERPL-MCNC: 4.1 GM/DL (ref 3.4–5)
ALP BLD-CCNC: 101 IU/L (ref 40–129)
ALT SERPL-CCNC: 40 U/L (ref 10–40)
ANION GAP SERPL CALCULATED.3IONS-SCNC: 17 MMOL/L (ref 4–16)
AST SERPL-CCNC: 50 IU/L (ref 15–37)
BASOPHILS ABSOLUTE: 0 K/CU MM
BASOPHILS RELATIVE PERCENT: 0 % (ref 0–1)
BILIRUB SERPL-MCNC: 0.8 MG/DL (ref 0–1)
BILIRUBIN DIRECT: 0.2 MG/DL (ref 0–0.3)
BILIRUBIN, INDIRECT: 0.6 MG/DL (ref 0–0.7)
BUN BLDV-MCNC: 6 MG/DL (ref 6–23)
C-REACTIVE PROTEIN, HIGH SENSITIVITY: 6.8 MG/L
CALCIUM SERPL-MCNC: 9.2 MG/DL (ref 8.3–10.6)
CHLORIDE BLD-SCNC: 99 MMOL/L (ref 99–110)
CO2: 26 MMOL/L (ref 21–32)
CREAT SERPL-MCNC: 0.7 MG/DL (ref 0.6–1.1)
DIFFERENTIAL TYPE: ABNORMAL
EOSINOPHILS ABSOLUTE: 0 K/CU MM
EOSINOPHILS RELATIVE PERCENT: 0 % (ref 0–3)
ERYTHROCYTE SEDIMENTATION RATE: 52 MM/HR (ref 0–30)
GFR AFRICAN AMERICAN: >60 ML/MIN/1.73M2
GFR NON-AFRICAN AMERICAN: >60 ML/MIN/1.73M2
GLUCOSE FASTING: 123 MG/DL (ref 70–99)
HAV IGM SER IA-ACNC: NON REACTIVE
HCT VFR BLD CALC: 43.4 % (ref 37–47)
HEMOGLOBIN: 14.4 GM/DL (ref 12.5–16)
IMMATURE NEUTROPHIL %: 0.6 % (ref 0–0.43)
LYMPHOCYTES ABSOLUTE: 1.9 K/CU MM
LYMPHOCYTES RELATIVE PERCENT: 24.2 % (ref 24–44)
MCH RBC QN AUTO: 32.4 PG (ref 27–31)
MCHC RBC AUTO-ENTMCNC: 33.2 % (ref 32–36)
MCV RBC AUTO: 97.5 FL (ref 78–100)
MONOCYTES ABSOLUTE: 0.5 K/CU MM
MONOCYTES RELATIVE PERCENT: 6.7 % (ref 0–4)
PDW BLD-RTO: 13.4 % (ref 11.7–14.9)
PLATELET # BLD: 260 K/CU MM (ref 140–440)
PMV BLD AUTO: 9.4 FL (ref 7.5–11.1)
POTASSIUM SERPL-SCNC: 4.1 MMOL/L (ref 3.5–5.1)
RBC # BLD: 4.45 M/CU MM (ref 4.2–5.4)
SEGMENTED NEUTROPHILS ABSOLUTE COUNT: 5.3 K/CU MM
SEGMENTED NEUTROPHILS RELATIVE PERCENT: 68.5 % (ref 36–66)
SODIUM BLD-SCNC: 142 MMOL/L (ref 135–145)
TOTAL IMMATURE NEUTOROPHIL: 0.05 K/CU MM
TOTAL PROTEIN: 7.3 GM/DL (ref 6.4–8.2)
TSH HIGH SENSITIVITY: 2.64 UIU/ML (ref 0.27–4.2)
WBC # BLD: 7.7 K/CU MM (ref 4–10.5)

## 2018-09-20 LAB
ANTI-NUCLEAR ANTIBODY (ANA): DETECTED
HAV AB SERPL IA-ACNC: POSITIVE

## 2018-09-21 ENCOUNTER — TELEPHONE (OUTPATIENT)
Dept: GASTROENTEROLOGY | Age: 73
End: 2018-09-21

## 2018-09-21 NOTE — TELEPHONE ENCOUNTER
Referral was faxed to Dr. Patricia Bonilla on 9/11/18. Patient advised of results. Verbal understanding expressed. She stated she will keep her appt coming up.   Pt has appt scheduled with Dr. Patricia Bonilla, rheumatologist, on Tuesday, 9/25/18

## 2018-09-22 LAB — ANA TITER: NORMAL

## 2018-09-26 ENCOUNTER — HOSPITAL ENCOUNTER (OUTPATIENT)
Dept: GENERAL RADIOLOGY | Age: 73
Discharge: HOME | End: 2018-09-26
Payer: COMMERCIAL

## 2018-09-26 ENCOUNTER — HOSPITAL ENCOUNTER (OUTPATIENT)
Age: 73
Discharge: HOME | End: 2018-09-26
Payer: COMMERCIAL

## 2018-09-26 DIAGNOSIS — R52 PAIN: ICD-10-CM

## 2018-09-26 LAB
ALT SERPL-CCNC: 36 U/L (ref 10–40)
AST SERPL-CCNC: 46 IU/L (ref 15–37)
BACTERIA: ABNORMAL /HPF
BILIRUBIN URINE: NEGATIVE MG/DL
BLOOD, URINE: NEGATIVE
CAST TYPE: ABNORMAL /HPF
CLARITY: CLEAR
COLOR: YELLOW
CRYSTAL TYPE: ABNORMAL /HPF
EPITHELIAL CELLS, UA: ABNORMAL /HPF
ERYTHROCYTE SEDIMENTATION RATE: 67 MM/HR (ref 0–30)
GLUCOSE, URINE: NEGATIVE MG/DL
KETONES, URINE: NEGATIVE MG/DL
LEUKOCYTE ESTERASE, URINE: NEGATIVE
MUCUS: NEGATIVE HPF
NITRITE URINE, QUANTITATIVE: NEGATIVE
PH, URINE: 7 (ref 5–8)
PROTEIN UA: NEGATIVE MG/DL
RBC URINE: ABNORMAL /HPF (ref 0–6)
SPECIFIC GRAVITY UA: 1.01 (ref 1–1.03)
UROBILINOGEN, URINE: 0.2 MG/DL (ref 0.2–1)
WBC UA: ABNORMAL /HPF (ref 0–5)

## 2018-09-26 PROCEDURE — 83516 IMMUNOASSAY NONANTIBODY: CPT

## 2018-09-26 PROCEDURE — 72100 X-RAY EXAM L-S SPINE 2/3 VWS: CPT

## 2018-09-26 PROCEDURE — 36415 COLL VENOUS BLD VENIPUNCTURE: CPT

## 2018-09-26 PROCEDURE — 86160 COMPLEMENT ANTIGEN: CPT

## 2018-09-26 PROCEDURE — 73560 X-RAY EXAM OF KNEE 1 OR 2: CPT

## 2018-09-26 PROCEDURE — 85652 RBC SED RATE AUTOMATED: CPT

## 2018-09-26 PROCEDURE — 84450 TRANSFERASE (AST) (SGOT): CPT

## 2018-09-26 PROCEDURE — 86200 CCP ANTIBODY: CPT

## 2018-09-26 PROCEDURE — 86235 NUCLEAR ANTIGEN ANTIBODY: CPT

## 2018-09-26 PROCEDURE — 86225 DNA ANTIBODY NATIVE: CPT

## 2018-09-26 PROCEDURE — 86430 RHEUMATOID FACTOR TEST QUAL: CPT

## 2018-09-26 PROCEDURE — 84460 ALANINE AMINO (ALT) (SGPT): CPT

## 2018-09-26 PROCEDURE — 84165 PROTEIN E-PHORESIS SERUM: CPT

## 2018-09-26 PROCEDURE — 81001 URINALYSIS AUTO W/SCOPE: CPT

## 2018-09-27 LAB
ALBUMIN ELP: 4.1 GM/DL (ref 3.2–5.6)
ALPHA-1-GLOBULIN: 0.3 GM/DL (ref 0.1–0.4)
ALPHA-2-GLOBULIN: 0.7 GM/DL (ref 0.4–1.2)
BETA GLOBULIN: 0.9 GM/DL (ref 0.5–1.3)
GAMMA GLOBULIN: 1.1 GM/DL (ref 0.5–1.6)
TOTAL PROTEIN: 7.1 GM/DL (ref 6.4–8.2)

## 2018-09-28 LAB
ANTI DNA DOUBLE STRANDED: NORMAL
COMPLEMENT C3: 146 MG/DL
COMPLEMENT C4: 14 MG/DL
CYCLIC CITRULLINATED PEPTIDE ANTIBODY IGG: 7
F-ACTIN AB, IGG: 33

## 2018-09-29 LAB
ANTI RNP AB: 0
ENA TO SSA (RO) ANTIBODY: 3
Lab: NORMAL
SCLERODERMA (SCL-70) AB: 0
SMOOTH MUSCLE AB IGG TITER: NORMAL
SSA 60 RO IGG ANTIBODY: 88
TEST NAME: NORMAL

## 2018-10-01 ENCOUNTER — TELEPHONE (OUTPATIENT)
Dept: GASTROENTEROLOGY | Age: 73
End: 2018-10-01

## 2018-10-12 ENCOUNTER — OFFICE VISIT (OUTPATIENT)
Dept: FAMILY MEDICINE CLINIC | Age: 73
End: 2018-10-12
Payer: COMMERCIAL

## 2018-10-12 VITALS
BODY MASS INDEX: 38.02 KG/M2 | WEIGHT: 265 LBS | RESPIRATION RATE: 16 BRPM | SYSTOLIC BLOOD PRESSURE: 130 MMHG | HEART RATE: 71 BPM | DIASTOLIC BLOOD PRESSURE: 76 MMHG

## 2018-10-12 DIAGNOSIS — R76.8 ANA POSITIVE: ICD-10-CM

## 2018-10-12 DIAGNOSIS — I10 ESSENTIAL HYPERTENSION: ICD-10-CM

## 2018-10-12 DIAGNOSIS — R73.9 HYPERGLYCEMIA: ICD-10-CM

## 2018-10-12 DIAGNOSIS — F33.41 RECURRENT MAJOR DEPRESSIVE DISORDER, IN PARTIAL REMISSION (HCC): ICD-10-CM

## 2018-10-12 DIAGNOSIS — R42 VERTIGO: Primary | ICD-10-CM

## 2018-10-12 LAB — HBA1C MFR BLD: 5.8 %

## 2018-10-12 PROCEDURE — 83036 HEMOGLOBIN GLYCOSYLATED A1C: CPT | Performed by: FAMILY MEDICINE

## 2018-10-12 PROCEDURE — 99214 OFFICE O/P EST MOD 30 MIN: CPT | Performed by: FAMILY MEDICINE

## 2018-10-12 RX ORDER — PREDNISONE 20 MG/1
20 TABLET ORAL 2 TIMES DAILY
Qty: 10 TABLET | Refills: 0 | Status: SHIPPED | OUTPATIENT
Start: 2018-10-12 | End: 2018-10-17

## 2018-10-12 RX ORDER — MECLIZINE HCL 12.5 MG/1
25 TABLET ORAL 3 TIMES DAILY PRN
Qty: 60 TABLET | Refills: 2 | Status: SHIPPED | OUTPATIENT
Start: 2018-10-12 | End: 2018-10-22

## 2018-10-12 RX ORDER — VENLAFAXINE HYDROCHLORIDE 75 MG/1
75 CAPSULE, EXTENDED RELEASE ORAL DAILY
Qty: 90 CAPSULE | Refills: 3 | Status: SHIPPED | OUTPATIENT
Start: 2018-10-12 | End: 2019-08-30 | Stop reason: SDUPTHER

## 2018-10-12 RX ORDER — AZITHROMYCIN 250 MG/1
TABLET, FILM COATED ORAL
Qty: 1 PACKET | Refills: 0 | Status: SHIPPED | OUTPATIENT
Start: 2018-10-12 | End: 2019-10-09 | Stop reason: ALTCHOICE

## 2018-10-12 ASSESSMENT — ENCOUNTER SYMPTOMS: NAUSEA: 1

## 2018-10-31 ENCOUNTER — TELEPHONE (OUTPATIENT)
Dept: GASTROENTEROLOGY | Age: 73
End: 2018-10-31

## 2018-10-31 DIAGNOSIS — R74.8 ELEVATED LIVER ENZYMES: Primary | ICD-10-CM

## 2018-11-05 ENCOUNTER — TELEPHONE (OUTPATIENT)
Dept: GASTROENTEROLOGY | Age: 73
End: 2018-11-05

## 2018-12-10 DIAGNOSIS — E03.9 ACQUIRED HYPOTHYROIDISM: ICD-10-CM

## 2018-12-10 RX ORDER — LEVOTHYROXINE SODIUM 0.12 MG/1
TABLET ORAL
Qty: 90 TABLET | Refills: 2 | Status: SHIPPED | OUTPATIENT
Start: 2018-12-10 | End: 2019-08-23 | Stop reason: SDUPTHER

## 2019-01-14 DIAGNOSIS — I10 ESSENTIAL HYPERTENSION: ICD-10-CM

## 2019-01-14 RX ORDER — CARVEDILOL 12.5 MG/1
12.5 TABLET ORAL 2 TIMES DAILY
Qty: 180 TABLET | Refills: 3 | Status: SHIPPED | OUTPATIENT
Start: 2019-01-14 | End: 2019-12-03 | Stop reason: SDUPTHER

## 2019-01-14 RX ORDER — CARVEDILOL 12.5 MG/1
12.5 TABLET ORAL 2 TIMES DAILY
Qty: 180 TABLET | Refills: 3 | Status: CANCELLED | OUTPATIENT
Start: 2019-01-14

## 2019-02-07 ENCOUNTER — TELEPHONE (OUTPATIENT)
Dept: GASTROENTEROLOGY | Age: 74
End: 2019-02-07

## 2019-02-11 ENCOUNTER — TELEPHONE (OUTPATIENT)
Dept: GASTROENTEROLOGY | Age: 74
End: 2019-02-11

## 2019-02-20 RX ORDER — SIMVASTATIN 20 MG
20 TABLET ORAL NIGHTLY
Qty: 90 TABLET | Refills: 3 | Status: SHIPPED | OUTPATIENT
Start: 2019-02-20 | End: 2019-08-25 | Stop reason: SDUPTHER

## 2019-04-11 ENCOUNTER — TELEPHONE (OUTPATIENT)
Dept: FAMILY MEDICINE CLINIC | Age: 74
End: 2019-04-11

## 2019-04-11 DIAGNOSIS — R92.8 ABNORMAL MAMMOGRAM: Primary | ICD-10-CM

## 2019-04-11 NOTE — TELEPHONE ENCOUNTER
Braxton from [address] on nurse VM stating patient is scheduled for upcoming RT Breast follow up. Requesting order for [facility] as patient is also due in May.   Order placed at this time for [facility]

## 2019-04-12 ENCOUNTER — HOSPITAL ENCOUNTER (OUTPATIENT)
Dept: ULTRASOUND IMAGING | Age: 74
Discharge: HOME | End: 2019-04-12
Payer: COMMERCIAL

## 2019-04-12 ENCOUNTER — HOSPITAL ENCOUNTER (OUTPATIENT)
Dept: WOMENS IMAGING | Age: 74
Discharge: HOME | End: 2019-04-12
Payer: COMMERCIAL

## 2019-04-12 DIAGNOSIS — R92.8 ABNORMAL MAMMOGRAM: ICD-10-CM

## 2019-04-12 DIAGNOSIS — Z09 FOLLOW-UP EXAM: ICD-10-CM

## 2019-04-12 PROCEDURE — 76642 ULTRASOUND BREAST LIMITED: CPT

## 2019-04-12 PROCEDURE — 77066 DX MAMMO INCL CAD BI: CPT

## 2019-05-02 LAB
ALT SERPL-CCNC: 22 U/L (ref 10–40)
AST SERPL-CCNC: 21 U/L (ref 15–37)
BUN BLDV-MCNC: 8 MG/DL (ref 7–20)
CREAT SERPL-MCNC: 0.6 MG/DL (ref 0.6–1.2)
GFR AFRICAN AMERICAN: >60
GFR NON-AFRICAN AMERICAN: >60
HCT VFR BLD CALC: 42 % (ref 36–48)
HEMOGLOBIN: 14 G/DL (ref 12–16)
MCH RBC QN AUTO: 32.4 PG (ref 26–34)
MCHC RBC AUTO-ENTMCNC: 33.2 G/DL (ref 31–36)
MCV RBC AUTO: 97.6 FL (ref 80–100)
PDW BLD-RTO: 13.1 % (ref 12.4–15.4)
PLATELET # BLD: 283 K/UL (ref 135–450)
PMV BLD AUTO: 8.7 FL (ref 5–10.5)
RBC # BLD: 4.3 M/UL (ref 4–5.2)
WBC # BLD: 7.9 K/UL (ref 4–11)

## 2019-05-03 LAB — SEDIMENTATION RATE, ERYTHROCYTE: 30 MM/HR (ref 0–30)

## 2019-08-23 DIAGNOSIS — E03.9 ACQUIRED HYPOTHYROIDISM: ICD-10-CM

## 2019-08-23 RX ORDER — LEVOTHYROXINE SODIUM 0.12 MG/1
TABLET ORAL
Qty: 90 TABLET | Refills: 0 | Status: SHIPPED | OUTPATIENT
Start: 2019-08-23 | End: 2019-10-09 | Stop reason: SDUPTHER

## 2019-08-26 RX ORDER — SIMVASTATIN 20 MG
TABLET ORAL
Qty: 90 TABLET | Refills: 2 | Status: SHIPPED | OUTPATIENT
Start: 2019-08-26 | End: 2020-05-22 | Stop reason: SDUPTHER

## 2019-08-30 DIAGNOSIS — F33.41 RECURRENT MAJOR DEPRESSIVE DISORDER, IN PARTIAL REMISSION (HCC): ICD-10-CM

## 2019-08-30 RX ORDER — VENLAFAXINE HYDROCHLORIDE 75 MG/1
CAPSULE, EXTENDED RELEASE ORAL
Qty: 90 CAPSULE | Refills: 1 | Status: SHIPPED | OUTPATIENT
Start: 2019-08-30 | End: 2020-03-12 | Stop reason: SDUPTHER

## 2019-10-09 ENCOUNTER — OFFICE VISIT (OUTPATIENT)
Dept: FAMILY MEDICINE CLINIC | Age: 74
End: 2019-10-09
Payer: COMMERCIAL

## 2019-10-09 VITALS
RESPIRATION RATE: 18 BRPM | SYSTOLIC BLOOD PRESSURE: 120 MMHG | BODY MASS INDEX: 42.89 KG/M2 | DIASTOLIC BLOOD PRESSURE: 78 MMHG | HEIGHT: 69 IN | HEART RATE: 71 BPM | OXYGEN SATURATION: 98 % | WEIGHT: 289.6 LBS

## 2019-10-09 DIAGNOSIS — Z13.220 SCREENING FOR HYPERLIPIDEMIA: ICD-10-CM

## 2019-10-09 DIAGNOSIS — R73.03 PREDIABETES: ICD-10-CM

## 2019-10-09 DIAGNOSIS — Z00.00 ROUTINE GENERAL MEDICAL EXAMINATION AT A HEALTH CARE FACILITY: Primary | ICD-10-CM

## 2019-10-09 DIAGNOSIS — Z23 NEED FOR SHINGLES VACCINE: ICD-10-CM

## 2019-10-09 DIAGNOSIS — Z23 NEED FOR INFLUENZA VACCINATION: ICD-10-CM

## 2019-10-09 DIAGNOSIS — Z23 NEED FOR PROPHYLACTIC VACCINATION AGAINST DIPHTHERIA-TETANUS-PERTUSSIS (DTP): ICD-10-CM

## 2019-10-09 DIAGNOSIS — E03.9 ACQUIRED HYPOTHYROIDISM: ICD-10-CM

## 2019-10-09 LAB
A/G RATIO: 1.9 (ref 1.1–2.2)
ALBUMIN SERPL-MCNC: 4.3 G/DL (ref 3.4–5)
ALP BLD-CCNC: 87 U/L (ref 40–129)
ALT SERPL-CCNC: 17 U/L (ref 10–40)
ANION GAP SERPL CALCULATED.3IONS-SCNC: 15 MMOL/L (ref 3–16)
AST SERPL-CCNC: 18 U/L (ref 15–37)
BILIRUB SERPL-MCNC: 0.6 MG/DL (ref 0–1)
BUN BLDV-MCNC: 10 MG/DL (ref 7–20)
CALCIUM SERPL-MCNC: 9.3 MG/DL (ref 8.3–10.6)
CHLORIDE BLD-SCNC: 101 MMOL/L (ref 99–110)
CHOLESTEROL, TOTAL: 130 MG/DL (ref 0–199)
CO2: 26 MMOL/L (ref 21–32)
CREAT SERPL-MCNC: 0.6 MG/DL (ref 0.6–1.2)
GFR AFRICAN AMERICAN: >60
GFR NON-AFRICAN AMERICAN: >60
GLOBULIN: 2.3 G/DL
GLUCOSE BLD-MCNC: 131 MG/DL (ref 70–99)
HBA1C MFR BLD: 6 %
HDLC SERPL-MCNC: 65 MG/DL (ref 40–60)
LDL CHOLESTEROL CALCULATED: 21 MG/DL
POTASSIUM SERPL-SCNC: 4.6 MMOL/L (ref 3.5–5.1)
SODIUM BLD-SCNC: 142 MMOL/L (ref 136–145)
TOTAL PROTEIN: 6.6 G/DL (ref 6.4–8.2)
TRIGL SERPL-MCNC: 218 MG/DL (ref 0–150)
TSH SERPL DL<=0.05 MIU/L-ACNC: 1.41 UIU/ML (ref 0.27–4.2)
VLDLC SERPL CALC-MCNC: 44 MG/DL

## 2019-10-09 PROCEDURE — G0438 PPPS, INITIAL VISIT: HCPCS | Performed by: FAMILY MEDICINE

## 2019-10-09 PROCEDURE — 36415 COLL VENOUS BLD VENIPUNCTURE: CPT | Performed by: FAMILY MEDICINE

## 2019-10-09 PROCEDURE — 90686 IIV4 VACC NO PRSV 0.5 ML IM: CPT | Performed by: FAMILY MEDICINE

## 2019-10-09 PROCEDURE — 83036 HEMOGLOBIN GLYCOSYLATED A1C: CPT | Performed by: FAMILY MEDICINE

## 2019-10-09 PROCEDURE — G0008 ADMIN INFLUENZA VIRUS VAC: HCPCS | Performed by: FAMILY MEDICINE

## 2019-10-09 RX ORDER — HYDROXYCHLOROQUINE SULFATE 200 MG/1
1 TABLET, FILM COATED ORAL DAILY
Refills: 0 | COMMUNITY
Start: 2019-08-15 | End: 2020-12-22 | Stop reason: ALTCHOICE

## 2019-10-09 RX ORDER — PREDNISONE 1 MG/1
1 TABLET ORAL DAILY
Refills: 2 | COMMUNITY
Start: 2019-09-01 | End: 2020-12-22 | Stop reason: ALTCHOICE

## 2019-10-09 RX ORDER — LEVOTHYROXINE SODIUM 0.12 MG/1
TABLET ORAL
Qty: 90 TABLET | Refills: 3 | Status: SHIPPED | OUTPATIENT
Start: 2019-10-09 | End: 2020-10-27 | Stop reason: SDUPTHER

## 2019-10-09 ASSESSMENT — PATIENT HEALTH QUESTIONNAIRE - PHQ9
SUM OF ALL RESPONSES TO PHQ QUESTIONS 1-9: 0
SUM OF ALL RESPONSES TO PHQ QUESTIONS 1-9: 0

## 2019-10-09 ASSESSMENT — LIFESTYLE VARIABLES: HOW OFTEN DO YOU HAVE A DRINK CONTAINING ALCOHOL: 0

## 2019-10-17 ENCOUNTER — HOSPITAL ENCOUNTER (OUTPATIENT)
Dept: GENERAL RADIOLOGY | Age: 74
Discharge: HOME | End: 2019-10-17
Payer: COMMERCIAL

## 2019-10-17 ENCOUNTER — HOSPITAL ENCOUNTER (OUTPATIENT)
Age: 74
Discharge: HOME | End: 2019-10-17
Payer: COMMERCIAL

## 2019-10-17 DIAGNOSIS — M25.562 LEFT KNEE PAIN, UNSPECIFIED CHRONICITY: ICD-10-CM

## 2019-10-17 PROCEDURE — 73560 X-RAY EXAM OF KNEE 1 OR 2: CPT

## 2019-12-03 DIAGNOSIS — I10 ESSENTIAL HYPERTENSION: ICD-10-CM

## 2019-12-03 RX ORDER — CARVEDILOL 12.5 MG/1
TABLET ORAL
Qty: 180 TABLET | Refills: 2 | Status: SHIPPED | OUTPATIENT
Start: 2019-12-03 | End: 2020-10-09 | Stop reason: SDUPTHER

## 2020-05-26 RX ORDER — SIMVASTATIN 20 MG
TABLET ORAL
Qty: 90 TABLET | Refills: 2 | Status: SHIPPED | OUTPATIENT
Start: 2020-05-26 | End: 2020-10-16 | Stop reason: SDUPTHER

## 2020-06-16 ENCOUNTER — OFFICE VISIT (OUTPATIENT)
Dept: FAMILY MEDICINE CLINIC | Age: 75
End: 2020-06-16
Payer: COMMERCIAL

## 2020-06-16 VITALS
HEART RATE: 65 BPM | SYSTOLIC BLOOD PRESSURE: 136 MMHG | TEMPERATURE: 97.2 F | RESPIRATION RATE: 18 BRPM | WEIGHT: 293 LBS | BODY MASS INDEX: 43.4 KG/M2 | OXYGEN SATURATION: 98 % | DIASTOLIC BLOOD PRESSURE: 88 MMHG | HEIGHT: 69 IN

## 2020-06-16 PROBLEM — R73.01 IMPAIRED FASTING GLUCOSE: Status: ACTIVE | Noted: 2020-06-16

## 2020-06-16 PROBLEM — R29.6 FALLS FREQUENTLY: Status: ACTIVE | Noted: 2020-06-16

## 2020-06-16 PROBLEM — Z12.39 SCREENING FOR BREAST CANCER: Status: ACTIVE | Noted: 2020-06-16

## 2020-06-16 PROCEDURE — 99214 OFFICE O/P EST MOD 30 MIN: CPT | Performed by: NURSE PRACTITIONER

## 2020-06-16 RX ORDER — VENLAFAXINE HYDROCHLORIDE 75 MG/1
75 CAPSULE, EXTENDED RELEASE ORAL DAILY
Qty: 90 CAPSULE | Refills: 1 | Status: SHIPPED | OUTPATIENT
Start: 2020-06-16 | End: 2020-06-22

## 2020-06-16 ASSESSMENT — PATIENT HEALTH QUESTIONNAIRE - PHQ9
1. LITTLE INTEREST OR PLEASURE IN DOING THINGS: 0
SUM OF ALL RESPONSES TO PHQ9 QUESTIONS 1 & 2: 0
SUM OF ALL RESPONSES TO PHQ QUESTIONS 1-9: 0
SUM OF ALL RESPONSES TO PHQ QUESTIONS 1-9: 0
2. FEELING DOWN, DEPRESSED OR HOPELESS: 0

## 2020-06-16 ASSESSMENT — ENCOUNTER SYMPTOMS
EYES NEGATIVE: 1
GASTROINTESTINAL NEGATIVE: 1
COLOR CHANGE: 1
RESPIRATORY NEGATIVE: 1

## 2020-06-17 DIAGNOSIS — I10 ESSENTIAL HYPERTENSION: ICD-10-CM

## 2020-06-17 DIAGNOSIS — E03.9 ACQUIRED HYPOTHYROIDISM: ICD-10-CM

## 2020-06-17 DIAGNOSIS — R73.01 IMPAIRED FASTING GLUCOSE: ICD-10-CM

## 2020-06-17 LAB
A/G RATIO: 2 (ref 1.1–2.2)
ALBUMIN SERPL-MCNC: 3.9 G/DL (ref 3.4–5)
ALP BLD-CCNC: 72 U/L (ref 40–129)
ALT SERPL-CCNC: 28 U/L (ref 10–40)
ANION GAP SERPL CALCULATED.3IONS-SCNC: 10 MMOL/L (ref 3–16)
AST SERPL-CCNC: 17 U/L (ref 15–37)
BASOPHILS ABSOLUTE: 0 K/UL (ref 0–0.2)
BASOPHILS RELATIVE PERCENT: 0.2 %
BILIRUB SERPL-MCNC: 0.3 MG/DL (ref 0–1)
BUN BLDV-MCNC: 9 MG/DL (ref 7–20)
CALCIUM SERPL-MCNC: 8.8 MG/DL (ref 8.3–10.6)
CHLORIDE BLD-SCNC: 107 MMOL/L (ref 99–110)
CHOLESTEROL, FASTING: 144 MG/DL (ref 0–199)
CO2: 30 MMOL/L (ref 21–32)
CREAT SERPL-MCNC: 0.6 MG/DL (ref 0.6–1.2)
EOSINOPHILS ABSOLUTE: 0 K/UL (ref 0–0.6)
EOSINOPHILS RELATIVE PERCENT: 0 %
GFR AFRICAN AMERICAN: >60
GFR NON-AFRICAN AMERICAN: >60
GLOBULIN: 2 G/DL
GLUCOSE FASTING: 94 MG/DL (ref 70–99)
HCT VFR BLD CALC: 44 % (ref 36–48)
HDLC SERPL-MCNC: 70 MG/DL (ref 40–60)
HEMOGLOBIN: 14.8 G/DL (ref 12–16)
LDL CHOLESTEROL CALCULATED: 52 MG/DL
LYMPHOCYTES ABSOLUTE: 2.1 K/UL (ref 1–5.1)
LYMPHOCYTES RELATIVE PERCENT: 24 %
MCH RBC QN AUTO: 33.2 PG (ref 26–34)
MCHC RBC AUTO-ENTMCNC: 33.7 G/DL (ref 31–36)
MCV RBC AUTO: 98.6 FL (ref 80–100)
MONOCYTES ABSOLUTE: 0.6 K/UL (ref 0–1.3)
MONOCYTES RELATIVE PERCENT: 6.6 %
NEUTROPHILS ABSOLUTE: 6 K/UL (ref 1.7–7.7)
NEUTROPHILS RELATIVE PERCENT: 69.2 %
PDW BLD-RTO: 14.5 % (ref 12.4–15.4)
PLATELET # BLD: 226 K/UL (ref 135–450)
PMV BLD AUTO: 8.4 FL (ref 5–10.5)
POTASSIUM SERPL-SCNC: 4 MMOL/L (ref 3.5–5.1)
RBC # BLD: 4.46 M/UL (ref 4–5.2)
SODIUM BLD-SCNC: 147 MMOL/L (ref 136–145)
T4 FREE: 1.4 NG/DL (ref 0.9–1.8)
TOTAL PROTEIN: 5.9 G/DL (ref 6.4–8.2)
TRIGLYCERIDE, FASTING: 111 MG/DL (ref 0–150)
TSH SERPL DL<=0.05 MIU/L-ACNC: 4.89 UIU/ML (ref 0.27–4.2)
VLDLC SERPL CALC-MCNC: 22 MG/DL
WBC # BLD: 8.7 K/UL (ref 4–11)

## 2020-06-18 LAB
ESTIMATED AVERAGE GLUCOSE: 119.8 MG/DL
HBA1C MFR BLD: 5.8 %

## 2020-06-22 RX ORDER — VENLAFAXINE HYDROCHLORIDE 75 MG/1
CAPSULE, EXTENDED RELEASE ORAL
Qty: 90 CAPSULE | Refills: 0 | Status: SHIPPED | OUTPATIENT
Start: 2020-06-22 | End: 2020-10-16 | Stop reason: SDUPTHER

## 2020-06-29 ENCOUNTER — TELEPHONE (OUTPATIENT)
Dept: PHYSICAL MEDICINE AND REHAB | Age: 75
End: 2020-06-29

## 2020-07-16 PROBLEM — Z12.39 SCREENING FOR BREAST CANCER: Status: RESOLVED | Noted: 2020-06-16 | Resolved: 2020-07-16

## 2020-09-25 ENCOUNTER — OFFICE VISIT (OUTPATIENT)
Dept: PHYSICAL MEDICINE AND REHAB | Age: 75
End: 2020-09-25
Payer: COMMERCIAL

## 2020-09-25 VITALS — TEMPERATURE: 97 F

## 2020-09-25 PROCEDURE — 95910 NRV CNDJ TEST 7-8 STUDIES: CPT | Performed by: PHYSICAL MEDICINE & REHABILITATION

## 2020-09-25 PROCEDURE — 95886 MUSC TEST DONE W/N TEST COMP: CPT | Performed by: PHYSICAL MEDICINE & REHABILITATION

## 2020-09-28 NOTE — PROGRESS NOTES
Please let Jackie Mora know that I am referring her to Neurology for further evaluation- her EMG shows sensorimotor polyneuropathy with a mixture of patchy demyelination .

## 2020-10-01 ENCOUNTER — VIRTUAL VISIT (OUTPATIENT)
Dept: FAMILY MEDICINE CLINIC | Age: 75
End: 2020-10-01
Payer: COMMERCIAL

## 2020-10-01 PROBLEM — G60.8 POLYNEUROPATHY, PERIPHERAL SENSORIMOTOR AXONAL: Status: ACTIVE | Noted: 2020-10-01

## 2020-10-01 PROCEDURE — 99213 OFFICE O/P EST LOW 20 MIN: CPT | Performed by: NURSE PRACTITIONER

## 2020-10-01 ASSESSMENT — ENCOUNTER SYMPTOMS
RESPIRATORY NEGATIVE: 1
EYES NEGATIVE: 1
GASTROINTESTINAL NEGATIVE: 1

## 2020-10-01 NOTE — PROGRESS NOTES
10/1/2020    TELEHEALTH EVALUATION -- Audio/Visual (During BPAKS-94 public health emergency)    HPI:    Saud Fleming (:  1945) has requested an audio/video evaluation for the following concern(s):    She is following up on her EMG results. Referral has been placed to Neurology. She reports weakness in bilateral lower extremities that is progressively becoming weaker. She is using a walker to assist with ambulation most of the time to help prevent falls. She has also starting dropping objects. She is experiencing urinary incontinence that started about 9 month ago. No improvement with Kegels. She denies vision changes - she has cataract in the left eye    Review of Systems   Constitutional: Negative. HENT: Negative. Eyes: Negative. Respiratory: Negative. Cardiovascular: Negative. Gastrointestinal: Negative. Genitourinary: Negative. Urinary incontinence   Musculoskeletal: Positive for arthralgias. Skin: Negative. Neurological: Positive for weakness (bilateral upper and lower extremitis) and numbness (bilateral LEs). Negative for dizziness, tremors, seizures, syncope, facial asymmetry, speech difficulty, light-headedness and headaches. Psychiatric/Behavioral: Negative. Prior to Visit Medications    Medication Sig Taking?  Authorizing Provider   venlafaxine (EFFEXOR XR) 75 MG extended release capsule TAKE 1 CAPSULE BY MOUTH EVERY DAY Yes SUJEY Barnard CNP   simvastatin (ZOCOR) 20 MG tablet TAKE 1 TABLET BY MOUTH EVERY DAY AT NIGHT Yes SUJEY Sarabia CNP   carvedilol (COREG) 12.5 MG tablet TAKE 1 TABLET BY MOUTH TWICE A DAY Yes Tree Garcia MD   hydroxychloroquine (PLAQUENIL) 200 MG tablet Take 1 tablet by mouth daily Yes Historical Provider, MD   predniSONE (DELTASONE) 5 MG tablet Take 1 tablet by mouth daily Yes Historical Provider, MD   levothyroxine (SYNTHROID) 125 MCG tablet TAKE 1 TABLET BY MOUTH EVERY DAY Yes Tree Garcia MD Cardiovascular:      Rate and Rhythm: Normal rate and regular rhythm. Heart sounds: Normal heart sounds. Pulmonary:      Effort: Pulmonary effort is normal.      Breath sounds: Normal breath sounds. Abdominal:      General: Bowel sounds are normal. There is no distension. Palpations: Abdomen is soft. Tenderness: There is no abdominal tenderness. Musculoskeletal:      Right lower leg: No edema. Left lower leg: Edema (mild, non-pitting) present. Skin:     General: Skin is warm and dry. Findings: No bruising, erythema or rash. Neurological:      General: No focal deficit present. Mental Status: She is alert and oriented to person, place, and time. Motor: Weakness present. Gait: Gait abnormal.      Comments: Patient reports abnormal gait and weakness of bilateral upper and lower extremities   Psychiatric:         Mood and Affect: Mood normal.         Behavior: Behavior normal.         Judgment: Judgment normal.         ASSESSMENT/PLAN:  1. Falls frequently  Safety precautions discussed    2. Polyneuropathy, peripheral sensorimotor axonal  Referral placed to Neurology - recommend that you follow up for further evaluation as discussed. Return if symptoms worsen or fail to improve. Tahir Allen is a 76 y.o. female being evaluated by a Virtual Visit (video visit) encounter to address concerns as mentioned above. A caregiver was present when appropriate. Due to this being a TeleHealth encounter (During CGXKQ-43 public health emergency), evaluation of the following organ systems was limited: Vitals/Constitutional/EENT/Resp/CV/GI//MS/Neuro/Skin/Heme-Lymph-Imm.   Pursuant to the emergency declaration under the [facility], [address] [place] [address] and the [employer] and [employer] Act, this Virtual Visit was conducted with patient's (and/or legal guardian's) consent, to reduce the patient's risk of exposure to COVID-19 and provide necessary medical care. The patient (and/or legal guardian) has also been advised to contact this office for worsening conditions or problems, and seek emergency medical treatment and/or call 911 if deemed necessary. Patient identification was verified at the start of the visit: Yes    Total time spent on this encounter: 15 minutes    Services were provided through a video synchronous discussion virtually to substitute for in-person clinic visit. Patient was located at their individual home and provider located in the medical office. THIS VISIT WAS COMPLETED VIRTUALLY USING [employer]. ME    --SUJEY Horn CNP on 10/1/2020 at 12:52 PM    An electronic signature was used to authenticate this note.

## 2020-10-09 RX ORDER — CARVEDILOL 12.5 MG/1
12.5 TABLET ORAL 2 TIMES DAILY
Qty: 60 TABLET | Refills: 0 | Status: SHIPPED | OUTPATIENT
Start: 2020-10-09 | End: 2020-10-16 | Stop reason: SDUPTHER

## 2020-10-16 ENCOUNTER — OFFICE VISIT (OUTPATIENT)
Dept: FAMILY MEDICINE CLINIC | Age: 75
End: 2020-10-16
Payer: COMMERCIAL

## 2020-10-16 VITALS
SYSTOLIC BLOOD PRESSURE: 136 MMHG | TEMPERATURE: 97 F | RESPIRATION RATE: 16 BRPM | OXYGEN SATURATION: 98 % | DIASTOLIC BLOOD PRESSURE: 84 MMHG | HEART RATE: 70 BPM | BODY MASS INDEX: 43.54 KG/M2 | WEIGHT: 293 LBS

## 2020-10-16 PROCEDURE — 99214 OFFICE O/P EST MOD 30 MIN: CPT | Performed by: NURSE PRACTITIONER

## 2020-10-16 PROCEDURE — 90471 IMMUNIZATION ADMIN: CPT | Performed by: NURSE PRACTITIONER

## 2020-10-16 PROCEDURE — G0008 ADMIN INFLUENZA VIRUS VAC: HCPCS | Performed by: NURSE PRACTITIONER

## 2020-10-16 PROCEDURE — G0444 DEPRESSION SCREEN ANNUAL: HCPCS | Performed by: NURSE PRACTITIONER

## 2020-10-16 PROCEDURE — 90686 IIV4 VACC NO PRSV 0.5 ML IM: CPT | Performed by: NURSE PRACTITIONER

## 2020-10-16 PROCEDURE — 90715 TDAP VACCINE 7 YRS/> IM: CPT | Performed by: NURSE PRACTITIONER

## 2020-10-16 RX ORDER — SIMVASTATIN 20 MG
TABLET ORAL
Qty: 90 TABLET | Refills: 2 | Status: SHIPPED | OUTPATIENT
Start: 2020-10-16 | End: 2021-08-06 | Stop reason: SDUPTHER

## 2020-10-16 RX ORDER — VENLAFAXINE HYDROCHLORIDE 75 MG/1
CAPSULE, EXTENDED RELEASE ORAL
Qty: 90 CAPSULE | Refills: 2 | Status: SHIPPED | OUTPATIENT
Start: 2020-10-16 | End: 2020-12-22 | Stop reason: DRUGHIGH

## 2020-10-16 RX ORDER — CARVEDILOL 12.5 MG/1
12.5 TABLET ORAL 2 TIMES DAILY
Qty: 180 TABLET | Refills: 2 | Status: SHIPPED | OUTPATIENT
Start: 2020-10-16 | End: 2021-08-06 | Stop reason: SDUPTHER

## 2020-10-16 ASSESSMENT — ENCOUNTER SYMPTOMS
GASTROINTESTINAL NEGATIVE: 1
EYES NEGATIVE: 1
RESPIRATORY NEGATIVE: 1
COLOR CHANGE: 0

## 2020-10-16 ASSESSMENT — PATIENT HEALTH QUESTIONNAIRE - PHQ9
9. THOUGHTS THAT YOU WOULD BE BETTER OFF DEAD, OR OF HURTING YOURSELF: 0
8. MOVING OR SPEAKING SO SLOWLY THAT OTHER PEOPLE COULD HAVE NOTICED. OR THE OPPOSITE, BEING SO FIGETY OR RESTLESS THAT YOU HAVE BEEN MOVING AROUND A LOT MORE THAN USUAL: 0
3. TROUBLE FALLING OR STAYING ASLEEP: 0
4. FEELING TIRED OR HAVING LITTLE ENERGY: 0
7. TROUBLE CONCENTRATING ON THINGS, SUCH AS READING THE NEWSPAPER OR WATCHING TELEVISION: 0
1. LITTLE INTEREST OR PLEASURE IN DOING THINGS: 2
SUM OF ALL RESPONSES TO PHQ QUESTIONS 1-9: 3
5. POOR APPETITE OR OVEREATING: 0
10. IF YOU CHECKED OFF ANY PROBLEMS, HOW DIFFICULT HAVE THESE PROBLEMS MADE IT FOR YOU TO DO YOUR WORK, TAKE CARE OF THINGS AT HOME, OR GET ALONG WITH OTHER PEOPLE: 1
SUM OF ALL RESPONSES TO PHQ9 QUESTIONS 1 & 2: 3
2. FEELING DOWN, DEPRESSED OR HOPELESS: 1
6. FEELING BAD ABOUT YOURSELF - OR THAT YOU ARE A FAILURE OR HAVE LET YOURSELF OR YOUR FAMILY DOWN: 0

## 2020-10-16 NOTE — PROGRESS NOTES
Vaccine Information Sheet, \"Influenza - Inactivated\"  given to Gopal Stephen, or parent/legal guardian of  Gopal Stephen and verbalized understanding. Patient responses:    Have you ever had a reaction to a flu vaccine? no  Do you have any current illness?  no  Have you ever had [place] Syndrome?  no  Do you have a serious allergy to any of the follow: Neomycin, Polymyxin, Thimerosal, eggs or egg products?no    Flu vaccine given per order. Please see immunization tab. Risks and benefits explained. Current VIS given. yes      Immunizations Administered     Name Date Dose Route    Influenza, Quadv, IM, PF (6 mo and older Fluzone, Flulaval, Fluarix, and 3 yrs and older Afluria) 10/16/2020 0.5 mL Intramuscular    Site: Deltoid- Left    Lot: [id number]    NDC: 48091-256-27    Tdap (Boostrix, Adacel) 10/16/2020 0.5 mL Intramuscular    Site: Deltoid- Left    Lot: [id number]    NDC: 39990-010-69

## 2020-10-16 NOTE — PROGRESS NOTES
Neurological: Positive for numbness (bilateral LEs). Negative for dizziness, tremors, seizures, syncope, facial asymmetry, speech difficulty, weakness, light-headedness and headaches. Psychiatric/Behavioral: Negative. Objective:      /84   Pulse 70   Temp 97 °F (36.1 °C) (Temporal)   Resp 16   Wt 297 lb (134.7 kg)   SpO2 98%   BMI 43.54 kg/m²      Physical Exam  Vitals signs reviewed. Constitutional:       Appearance: Normal appearance. She is obese. HENT:      Head: Normocephalic. Right Ear: External ear normal.      Left Ear: External ear normal.      Nose: Nose normal.      Mouth/Throat:      Mouth: Mucous membranes are moist.      Pharynx: Oropharynx is clear. Eyes:      Conjunctiva/sclera: Conjunctivae normal.      Pupils: Pupils are equal, round, and reactive to light. Neck:      Musculoskeletal: Normal range of motion and neck supple. Vascular: No carotid bruit. Cardiovascular:      Rate and Rhythm: Normal rate and regular rhythm. Heart sounds: Normal heart sounds. Pulmonary:      Effort: Pulmonary effort is normal.      Breath sounds: Normal breath sounds. Abdominal:      General: Bowel sounds are normal. There is no distension. Palpations: Abdomen is soft. Tenderness: There is no abdominal tenderness. Musculoskeletal:      Right lower leg: No edema. Left lower leg: No edema. Skin:     General: Skin is warm and dry. Findings: No bruising. Neurological:      General: No focal deficit present. Mental Status: She is alert and oriented to person, place, and time. Psychiatric:         Mood and Affect: Mood normal.         Behavior: Behavior normal.         Judgment: Judgment normal.            Assessment / Plan:      1. Essential hypertension  Well controlled. Monitor blood pressures. Goal is 130/80 or less. Bring your blood pressure recordings to your next appointment, or you can send them to us.   Bring your home blood pressure machine with you to your next appointment. - carvedilol (COREG) 12.5 MG tablet; Take 1 tablet by mouth 2 times daily  Dispense: 180 tablet; Refill: 2    2. Mixed hyperlipidemia  - simvastatin (ZOCOR) 20 MG tablet; TAKE 1 TABLET BY MOUTH EVERY DAY AT NIGHT  Dispense: 90 tablet; Refill: 2    3. Acquired hypothyroidism  Will send in refill after labs are reviewed. TSH ordered by Neurology so will not repeat labs today. 4. Recurrent major depressive disorder, in partial remission ([facility] [address]  Stable - continue current medications. - venlafaxine (EFFEXOR XR) 75 MG extended release capsule; TAKE 1 CAPSULE BY MOUTH EVERY DAY  Dispense: 90 capsule; Refill: 2    5.  Encounter for immunization  - Tdap (age 6y and older) IM (Boostrix)  - INFLUENZA, QUADV, 3 YRS AND OLDER, IM PF, PREFILL SYR OR SDV, 0.5ML (AFLURIA QUADV, PF)          Christen Chamberlain, APRN - CNP

## 2020-10-19 ENCOUNTER — HOSPITAL ENCOUNTER (OUTPATIENT)
Dept: PHYSICAL THERAPY | Age: 75
Setting detail: THERAPIES SERIES
Discharge: HOME | End: 2020-10-19
Payer: COMMERCIAL

## 2020-10-19 PROCEDURE — 97110 THERAPEUTIC EXERCISES: CPT

## 2020-10-19 PROCEDURE — 97162 PT EVAL MOD COMPLEX 30 MIN: CPT

## 2020-10-19 NOTE — FLOWSHEET NOTE
>100 degrees Fahrenheit this date. Exercises: (No more than 4 columns)   Exercise/Equipment Date: 10/19/2020 Date Date           WARM UP      NuStep    x8' level 1 seat & UE 12            TABLE      LAQ 1x10 B LE with 3\" hold     Seated marching 1x10 B LE AA with strap to start on R                          STANDING                                                     PROPRIOCEPTION                                    MODALITIES                    Other Therapeutic Activities/Education: Pt educated on plan for PT and what to expect as well as HEP. Home Exercise Program:  Pt to perform LAQ and A marching 10x3-4x/day.  Pt expressed understanding    Manual Treatments:  none    Modalities:  none    Communication with other providers:  eval faxed    Assessment:  (Response towards treatment session) (Pain Rating)  4-5/10  Pt is a pleasant 75 yo female who would benefit from skilled PT to address decreased ROM, strength, balance, endurance, and functional mobility    Plan for Next Session:  Continue with general strengthening and balance training    Time In / Time Out:  2:55-3:45 pm       Timed Code/Total Treatment Minutes:  50 minutes/1 mod eval, 2 therapeutic exercise    Next Progress Note due:  11/30/2020    Plan of Care Interventions:  [x] Therapeutic Exercise  [] Modalities:  [x] Therapeutic Activity     [] Ultrasound  [] Estim  [x] Gait Training      [] Cervical Traction [] Lumbar Traction  [x] Neuromuscular Re-education    [] Cold/hotpack [] Iontophoresis   [x] Instruction in HEP      [] Vasopneumatic   [] Dry Needling    [] Manual Therapy               [] Aquatic Therapy              Electronically signed by:  Shabnam French DPT [id number]  10/19/2020, 3:33 PM

## 2020-10-19 NOTE — PROGRESS NOTES
[facility] Outpatient Physical Therapy  [address] [place] [id number]  Phone: [phone]  Fax: [phone]      PHYSICAL THERAPY INITIAL ASSESSMENT      Date: 10/19/2020  Patient Name: Princess Remy   : 1945  Referred by: Dr Shivam Francois  Reason for Referral:G60.9 hereditary and idiopathic neuropathy, unspecified   PT Impression: M62.81 muscle weakness, R26.89 abnormality of gait  Insurance: [employer]  Restrictions/Precautions: fall risk    Subjective   Chart Reviewed: Yes   Patient assessed for rehabilitation services?: Yes   Family / Caregiver Present: No  Follows Commands: Within Functional Limits   Date of onset:  Pt was diagnosed with Sjogrens about 2 years ago. She reports she had Hep A for 6 months and that triggered it. Subjective: Pt reports she has been falling forward and she uses a 4WW at home. She reports she sits on it to do sweeping and dusting. Current Situation: Pt reports she is in a flair up that started this am. She reports previously she had 4 good days. Observation:   Medication: updated in EMR    Pain Screening   Patient Currently in Pain:   Pain Assessment: 0-10   Pain Level: 8/10  Pt reports she is achey all over. Pt reports she tries to cut out sugar. Worst pain: 8/10   Best pain:   0-1/10   Sensation: impaired. Pt reports numbness and tingling in B LE with L>R. Pt reports the R recently started a few months ago. Pt reports numbness in feet and ankles and paresthesias allt he way up LE. Vision/Hearing   Vision: Within Functional Limits pt reports she has an immature cataract in L eye. Hearing: impaired. Pt reports she has \"fair\" hearing.      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 Hobbies: birding  Prior level of function:  Pt reports she used to walk 3 miles/day  Patient reports hardest things at home: 1) getting up and down from chair (espeically ann in restaurant)  2) walking 3) cooking/cleaning  Patient goals: Pt reports she would like to regain strength and get back to walking. Orientation: WFL    Objective  Strength LE  Hip:   Right Left   Hip flexion         2-/5        2+/5   Hip abd        3+/5        3+/5   Hip add        3+/5        3+/5     Knee:    Right Left   Knee flexion        3+/5        3+/5   Knee extension        3+/5        3+/5     Ankle:   Right Left   Ankle DF         3+/5        3+/5   Ankle inverison        3+/5        3/5   Ankle eversion        3+/5        3/5      Transfers  Sit to Stand: Mod I with great difficulty. Stand to sit: Mod I    Ambulation  Ambulation?: Yes  WB Status: FWB  Surface: level tile  Device: SPC  Assistance: supervision  Quality of Gait: Pt demonstrated genu recurvatum of L knee as well as genuvalgum of L LE. Distance: 100 feet  Stairs?: 2 steps with difficulty pulling herself up   Palpation: not performed    Additional Tests: LEFs:     Assessment   Decreased functional mobility ; Decreased strength;Decreased endurance;Decreased high-level IADLs;Decreased ADL status; Decreased ROM; Assessment: Pt is a pleasant 75 yo female who would benefit from skilled PT to address decreased ROM, strength, balance, endurance, and functional mobility. Prognosis: Good  Discharge Recommendations: Patient would benefit from additional therapy;Continue to assess pending progress,   Requires PT Follow Up: Yes  Activity Tolerance: Patient Tolerated treatment well. Treatment Administered: See flowsheet  Patient Education: See flowsheet  Learning Style: Any     Plan   Plan of care initiated  Frequency and duration of tx:  2x for 8 weeks and 1x/week x4 weeks  Barriers include: none  Treatment:  1.  Therapeutic exercises including ROM, PREs, stretching, strengthening, and stability  2. Therapeutic activity  3. Gait training  4. Stair training  5. Neuro re-ed  6. Coordination training and body awareness  7. Positioning and postural awareness  8. Modalities including e-stim, ultrasound, heat, cold, and foam roll  9. Manual therapy including: STM, MFR, and TPR  10. Aquatic Therapy  11. HEP and education    Goals  Short term goals to be achieved by November 30, 2020:  Short term goal 1: Pt will report compliance with current HEP as prescribed in order to improve LE strength and ROM. Short term goal 2: Pt will demonstrate B hip flexion greater than or equal to 3/5 in order to improve strength. Short term goal 3: Pt will demonstrate the ability to safely transfer sit to stand on the first attempt with 1 UE A in 3/4 trials in order to improve functional mobility and transfers. Short term goal 4: Pt will demonstrate the ability to safely stand x15 consecutive minutes while performing dynamic activities with supervision in order to improve endurance. Short term goal 5: Pt will demonstrate a a score of no more than ___ on the LEFs in order to improve quality of life. Long term goals to be achieved by January 11,2021:   Long term goal 1: Pt will demonstrate I with current HEP as prescribed in order to increase LE strength. Long term goal 2: Pt will demonstrate B LE strength greater than or equal to 4-/5 in order to improve strength. Long term goal 3: Pt will report she is able to ambulate with mod I for 20 minutes in order to achieve personal goal and improve endurance. Long term goal 4: Pt will demonstrate a a score of no more than ___ on the LEFs in order to improve quality of life. Note: Goals, frequency, plan, and recommendations will be updated as needed. Goals and treatment plan discussed with family and mutually agreed upon. YES   Will discharge patient when therapy goals have been met or when therapy is no longer deemed necessary. This plan was reviewed with the patient/family and they were in agreement. Electronically signed by:  Shazia Aragon PT DPT [id number] Date: 10/19/2020, Time: 1:94 PM      I certify that the above patient is under my care and requires the above skilled services. These professional services are to be provided from an established plan, reviewed by me at least every 90 days. These services are related to the diagnosis stated above and are medically necessary.     Date last seen by physician:_________________________________________________    Physician Signature:____________________________________________Date:_______________

## 2020-10-20 LAB
FOLATE: >20 NG/ML (ref 4.78–24.2)
TSH SERPL DL<=0.05 MIU/L-ACNC: 3.86 UIU/ML (ref 0.27–4.2)
VITAMIN B-12: 410 PG/ML (ref 211–911)
VITAMIN D 25-HYDROXY: 47.8 NG/ML

## 2020-10-21 LAB
ALBUMIN SERPL-MCNC: 2.9 G/DL (ref 3.1–4.9)
ALPHA-1-GLOBULIN: 0.3 G/DL (ref 0.2–0.4)
ALPHA-2-GLOBULIN: 1.1 G/DL (ref 0.4–1.1)
BETA GLOBULIN: 1 G/DL (ref 0.9–1.6)
ESTIMATED AVERAGE GLUCOSE: 128.4 MG/DL
GAMMA GLOBULIN: 0.7 G/DL (ref 0.6–1.8)
HBA1C MFR BLD: 6.1 %
SPE/IFE INTERPRETATION: NORMAL
TOTAL PROTEIN: 6 G/DL (ref 6.4–8.2)

## 2020-10-26 ENCOUNTER — HOSPITAL ENCOUNTER (OUTPATIENT)
Dept: PHYSICAL THERAPY | Age: 75
Setting detail: THERAPIES SERIES
Discharge: HOME | End: 2020-10-26
Payer: COMMERCIAL

## 2020-10-26 PROCEDURE — 97110 THERAPEUTIC EXERCISES: CPT

## 2020-10-26 PROCEDURE — 97112 NEUROMUSCULAR REEDUCATION: CPT

## 2020-10-26 NOTE — FLOWSHEET NOTE
Outpatient Physical Therapy  [place]           [] Phone: [phone]   Fax: [phone]  [facility]           [x] Phone: [phone]   Fax: [phone]        Physical Therapy Daily Treatment Note  Date:  10/26/2020    Patient Name:  Princess Lake    :  1945  MRN: [id number]  Restrictions/Precautions:  fall risk  Diagnosis:     G60.9 hereditary and idiopathic neuropathy, unspecified   Date of Injury/Surgery: unknown  Treatment Diagnosis:   M62.81 muscle weakness, R26.89 abnormality of gait   Insurance/Certification information:   [facility]  Referring Physician:    Dr Pizarro Poster of care signed (Y/N):  eval faxed  Outcome Measure: LEFs:   Visit# / total visits:   2/  Pain level: 6/10   Goals:     Short term goals to be achieved by 2020:  Short term goal 1: Pt will report compliance with current HEP as prescribed in order to improve LE strength and ROM. Short term goal 2: Pt will demonstrate B hip flexion greater than or equal to 3/5 in order to improve strength. Short term goal 3: Pt will demonstrate the ability to safely transfer sit to stand on the first attempt with 1 UE A in 3/4 trials in order to improve functional mobility and transfers. Short term goal 4: Pt will demonstrate the ability to safely stand x15 consecutive minutes while performing dynamic activities with supervision in order to improve endurance. Short term goal 5: Pt will demonstrate a a score of no more than ___ on the LEFs in order to improve quality of life. Subjective:  Patient states that she is feeling better than she did at her evaluation. Not having any pain with the neuropathy, but is having pain in both knees. Any changes in Ambulatory Summary Sheet?   None    Objective:  LEFS:   Prior to today's treatment session, patient was screened for signs and symptoms related to COVID-19 including but not limited to verbally answering questions related to feeling ill, cough, or SOB, along with taking temperature via forehead thermometer. Patient presented with all negative signs and symptoms and had no fever >100 degrees Fahrenheit this date. Exercises: (No more than 4 columns)   Exercise/Equipment Date: 10/19/2020 Date  10/26/2020 Date           WARM UP      NuStep    x8' level 1 seat & UE 12  x8' level 1 seat & UE 12          TABLE      LAQ 1x10 B LE with 3\" hold 10x3\"  B LE    Seated marching 1x10 B LE AA with strap to start on R 10x B LE    Adductor squeezes  20x    HS curls with T band  RTB 20x              STANDING      Hip Flexion, Extension, abduction  10x B    Marching  10x B                                       PROPRIOCEPTION      Balance on Blue foam  Cane flex, scap protraction/ret, circles CW/CCW  15x ea                             MODALITIES                    Other Therapeutic Activities/Education: Pt educated on plan for PT and what to expect as well as HEP. Home Exercise Program:  Pt to perform LAQ and A marching 10x3-4x/day. Pt expressed understanding    Manual Treatments:  none    Modalities:  none    Communication with other providers:  eval faxed    Assessment:  (Response towards treatment session) (Pain Rating) Patient denied any pain in the back and not much in the knees when he was finished with therapy.        Plan for Next Session:  Continue with general strengthening and balance training    Time In / Time Out:  1352/1434    Timed Code/Total Treatment Minutes:  43' (15' NR, 27' TE)    Next Progress Note due:  11/30/2020    Plan of Care Interventions:  [x] Therapeutic Exercise  [] Modalities:  [x] Therapeutic Activity     [] Ultrasound  [] Estim  [x] Gait Training      [] Cervical Traction [] Lumbar Traction  [x] Neuromuscular Re-education    [] Cold/hotpack [] Iontophoresis   [x] Instruction in HEP      [] Vasopneumatic   [] Dry Needling    [] Manual Therapy               [] Aquatic Therapy              Electronically signed by:  Gallo Arias  10/26/2020,

## 2020-10-27 RX ORDER — LEVOTHYROXINE SODIUM 0.12 MG/1
TABLET ORAL
Qty: 90 TABLET | Refills: 3 | Status: SHIPPED | OUTPATIENT
Start: 2020-10-27 | End: 2021-10-18

## 2020-10-28 ENCOUNTER — HOSPITAL ENCOUNTER (OUTPATIENT)
Dept: PHYSICAL THERAPY | Age: 75
Discharge: HOME | End: 2020-10-28

## 2020-10-28 NOTE — FLOWSHEET NOTE
Physical Therapy  Cancellation/No-show Note  Patient Name:  Maru Dunn  :  1945   Date:  10/28/2020  Cancelled visits to date: 1  No-shows to date: 0    For today's appointment patient:  [x]  Cancelled  []  Rescheduled appointment  []  No-show     Reason given by patient:  [x]  Patient ill  []  Conflicting appointment  []  No transportation    []  Conflict with work  []  No reason given  [x]  Other:     Comments:  Having some difficulty with autoimmune disorder.      Electronically signed by:  Magalys Marie PTA

## 2020-10-29 ENCOUNTER — HOSPITAL ENCOUNTER (OUTPATIENT)
Dept: PHYSICAL THERAPY | Age: 75
Setting detail: THERAPIES SERIES
Discharge: HOME | End: 2020-10-29
Payer: COMMERCIAL

## 2020-10-29 NOTE — FLOWSHEET NOTE
Physical Therapy  Cancellation/No-show Note  Patient Name:  Claudy Demarco  :  1945   Date:  10/29/2020  Cancelled visits to date: 2  No-shows to date: 0    For today's appointment patient:  [x]  Cancelled  []  Rescheduled appointment  []  No-show     Reason given by patient:  [x]  Patient ill  []  Conflicting appointment  []  No transportation    []  Conflict with work  []  No reason given  [x]  Other:     Comments:  Having some difficulty with autoimmune disorder.      Electronically signed by:  Alden Hadley PTA

## 2020-11-02 ENCOUNTER — HOSPITAL ENCOUNTER (OUTPATIENT)
Dept: PHYSICAL THERAPY | Age: 75
Setting detail: THERAPIES SERIES
Discharge: HOME | End: 2020-11-02
Payer: COMMERCIAL

## 2020-11-02 PROCEDURE — 97110 THERAPEUTIC EXERCISES: CPT

## 2020-11-02 NOTE — FLOWSHEET NOTE
Outpatient Physical Therapy  [place]           [] Phone: [phone]   Fax: [phone]  [facility]           [x] Phone: [phone]   Fax: [phone]        Physical Therapy Daily Treatment Note  Date:  2020    Patient Name:  Cherylene Lek    :  1945  MRN: [id number]  Restrictions/Precautions:  fall risk  Diagnosis:     G60.9 hereditary and idiopathic neuropathy, unspecified   Date of Injury/Surgery: unknown  Treatment Diagnosis:   M62.81 muscle weakness, R26.89 abnormality of gait   Insurance/Certification information:   [facility]  Referring Physician:    Dr Koko Goyal of care signed (Y/N):  eval faxed  Outcome Measure: LEFs:  =   Visit# / total visits:   3/  Pain level: 2/10   Goals:     Short term goals to be achieved by 2020:  Short term goal 1: Pt will report compliance with current HEP as prescribed in order to improve LE strength and ROM. Short term goal 2: Pt will demonstrate B hip flexion greater than or equal to 3/5 in order to improve strength. Short term goal 3: Pt will demonstrate the ability to safely transfer sit to stand on the first attempt with 1 UE A in 3/4 trials in order to improve functional mobility and transfers. Short term goal 4: Pt will demonstrate the ability to safely stand x15 consecutive minutes while performing dynamic activities with supervision in order to improve endurance. Short term goal 5: Pt will demonstrate a a score of no more than ___ on the LEFs in order to improve quality of life. Subjective:  Patient states that she is feeling better than she did at her evaluation. Not having any pain with the neuropathy, but is having pain in both knees. Any changes in Ambulatory Summary Sheet?   None    Objective:  LEFS:   Prior to today's treatment session, patient was screened for signs and symptoms related to COVID-19 including but not limited to verbally answering questions related to feeling ill, cough, or SOB, along with taking temperature via forehead thermometer. Patient presented with all negative signs and symptoms and had no fever >100 degrees Fahrenheit this date. Exercises: (No more than 4 columns)   Exercise/Equipment Date: 10/19/2020 Date  10/26/2020 Date: 11/2/2020           WARM UP      NuStep    x8' level 1 seat & UE 12  x8' level 1 seat & UE 12 x10' level 2 seat 13 UE 12         TABLE      LAQ 1x10 B LE with 3\" hold 10x3\"  B LE 1x15 B LE with 3\" hold   Seated marching 1x10 B LE AA with strap to start on R 10x B LE 1x15 B LE    Adductor squeezes  20x 1x20   HS curls with T band (resisted knee flex)  RTB 20x  RTB 1x20 B LE            STANDING      Hip Flexion, Extension, abduction  10x B 1x10 B LE   Marching  10x B 1x10 B LE   HS curls   1x10 B LE   Heel raises   1x10 B LE                          PROPRIOCEPTION      Balance on Blue foam  Cane flex, scap protraction/ret, circles CW/CCW  15x ea  Cane flex, scap protraction/ret, circles CW/CCW  10x ea with SBA                           MODALITIES                    Other Therapeutic Activities/Education: Pt educated on standing at the table to fold laundry. Home Exercise Program:  Pt to continue current HEP and continue to address standing tolerance at home. Manual Treatments:  none    Modalities:  none    Communication with other providers:  eval faxed    Assessment:  (Response towards treatment session) (Pain Rating) 1/10   Pt is a pleasant 77 yo female who would benefit from skilled PT to address decreased ROM, strength, balance, endurance, and functional mobility.      Plan for Next Session:  Continue with general strengthening and balance training    Time In / Time Out: 1:00-1:50 pm    Timed Code/Total Treatment Minutes:  48' 3 therapeutic exercise    Next Progress Note due:  11/30/2020    Plan of Care Interventions:  [x] Therapeutic Exercise  [] Modalities:  [x] Therapeutic Activity     [] Ultrasound  [] Estim  [x] Gait Training      [] Cervical Traction [] Lumbar Traction  [x] Neuromuscular Re-education    [] Cold/hotpack [] Iontophoresis   [x] Instruction in HEP      [] Vasopneumatic   [] Dry Needling    [] Manual Therapy               [] Aquatic Therapy              Electronically signed by:  Nilda Mackey DPT [id number]   11/2/2020, 1:01 PM

## 2020-11-04 ENCOUNTER — HOSPITAL ENCOUNTER (OUTPATIENT)
Dept: PHYSICAL THERAPY | Age: 75
Setting detail: THERAPIES SERIES
Discharge: HOME | End: 2020-11-04
Payer: COMMERCIAL

## 2020-11-04 PROCEDURE — 97110 THERAPEUTIC EXERCISES: CPT

## 2020-11-04 NOTE — FLOWSHEET NOTE
Outpatient Physical Therapy  [place]           [] Phone: [phone]   Fax: [phone]  [facility] Mazariegos           [x] Phone: [phone]   Fax: [phone]        Physical Therapy Daily Treatment Note  Date:  2020    Patient Name:  Raisa Willis    :  1945  MRN: [id number]  Restrictions/Precautions:  fall risk  Diagnosis:     G60.9 hereditary and idiopathic neuropathy, unspecified   Date of Injury/Surgery: unknown  Treatment Diagnosis:   M62.81 muscle weakness, R26.89 abnormality of gait   Insurance/Certification information:   [facility]  Referring Physician:    Dr Vivian Molina of care signed (Y/N):  eval faxed  Outcome Measure: LEFs:  =   Visit# / total visits:   4/  Pain level: 2/10   Goals:     Short term goals to be achieved by 2020:  Short term goal 1: Pt will report compliance with current HEP as prescribed in order to improve LE strength and ROM. Short term goal 2: Pt will demonstrate B hip flexion greater than or equal to 3/5 in order to improve strength. Short term goal 3: Pt will demonstrate the ability to safely transfer sit to stand on the first attempt with 1 UE A in 3/4 trials in order to improve functional mobility and transfers. Short term goal 4: Pt will demonstrate the ability to safely stand x15 consecutive minutes while performing dynamic activities with supervision in order to improve endurance. Short term goal 5: Pt will demonstrate a a score of no more than ___ on the LEFs in order to improve quality of life. Subjective:  Patient states that she has extreme fatigue today, but she is trying to push through. Any changes in Ambulatory Summary Sheet?   None    Objective: One seated rest break required to complete therapy  Prior to today's treatment session, patient was screened for signs and symptoms related to COVID-19 including but not limited to verbally answering questions related to feeling ill, cough, or SOB, along with taking temperature via forehead thermometer. Patient presented with all negative signs and symptoms and had no fever >100 degrees Fahrenheit this date. Exercises: (No more than 4 columns)   Exercise/Equipment Date  10/26/2020 Date: 11/2/2020 11/4/2020           WARM UP      NuStep    x8' level 1 seat & UE 12 x10' level 2 seat 13 UE 12 x10' level 2 seat 13 UE 12         TABLE      LAQ 10x3\"  B LE 1x15 B LE with 3\" hold 15x3\"  B LE   Seated marching 10x B LE 1x15 B LE  15x B LE   Adductor squeezes 20x 1x20 20x   HS curls with T band (resisted knee flex) RTB 20x  RTB 1x20 B LE RTB 20x B LE             STANDING      Hip Flexion, Extension, abduction 10x B 1x10 B LE 10x B LE   Marching 10x B 1x10 B LE 10x B LE   HS curls  1x10 B LE    Heel raises  1x10 B LE 10x B LE                           PROPRIOCEPTION      Balance on Blue foam Cane flex, scap protraction/ret, circles CW/CCW  15x ea  Cane flex, scap protraction/ret, circles CW/CCW  10x ea with SBA Cane flex, scap protraction/ret, circles CW/CCW  10x ea with SBA                           MODALITIES                    Other Therapeutic Activities/Education: Pt educated on standing at the table to fold laundry. Home Exercise Program:  Pt to continue current HEP and continue to address standing tolerance at home. Manual Treatments:  none    Modalities:  none    Communication with other providers:  eval faxed    Assessment:  (Response towards treatment session) (Pain Rating) Patient rated her pain 1/10 after treatment. Patient will continue to benefit from skilled therapy to progress strengthening and increase endurance.      Plan for Next Session:  Continue with general strengthening and balance training    Time In / Time Out: 1305/1344    Timed Code/Total Treatment Minutes:  44' 3 therapeutic exercise    Next Progress Note due:  11/30/2020    Plan of Care Interventions:  [x] Therapeutic Exercise  [] Modalities:  [x] Therapeutic Activity     [] Ultrasound  [] Estim  [x] Gait Training      [] Cervical Traction [] Lumbar Traction  [x] Neuromuscular Re-education    [] Cold/hotpack [] Iontophoresis   [x] Instruction in HEP      [] Vasopneumatic   [] Dry Needling    [] Manual Therapy               [] Aquatic Therapy              Electronically signed by:  Jud Olsen   11/4/2020, 1:03 PM

## 2020-11-09 ENCOUNTER — HOSPITAL ENCOUNTER (OUTPATIENT)
Dept: PHYSICAL THERAPY | Age: 75
Discharge: HOME | End: 2020-11-09

## 2020-11-09 NOTE — FLOWSHEET NOTE
Physical Therapy  Cancellation/No-show Note  Patient Name:  Davy Mccoy  :  1945   Date:  2020  Cancelled visits to date: 0  No-shows to date: 0    For today's appointment patient:  [x]  Cancelled  []  Rescheduled appointment  []  No-show     Reason given by patient:  [x]  Patient ill  []  Conflicting appointment  []  No transportation    []  Conflict with work  []  No reason given  []  Other:     Comments:  Pt reports she believes she has food poisoning.     Electronically signed by:  Onur Fernando DPT [id number]  2020, 3:22 PM

## 2020-11-11 ENCOUNTER — HOSPITAL ENCOUNTER (OUTPATIENT)
Dept: PHYSICAL THERAPY | Age: 75
Setting detail: THERAPIES SERIES
Discharge: HOME | End: 2020-11-11
Payer: COMMERCIAL

## 2020-11-11 PROCEDURE — 97110 THERAPEUTIC EXERCISES: CPT

## 2020-11-11 NOTE — FLOWSHEET NOTE
Outpatient Physical Therapy  [place]           [] Phone: [phone]   Fax: [phone]  [place]           [x] Phone: [phone]   Fax: [phone]        Physical Therapy Daily Treatment Note  Date:  2020    Patient Name:  Parrish Olivares    :  1945  MRN: [id number]  Restrictions/Precautions:  fall risk  Diagnosis:     G60.9 hereditary and idiopathic neuropathy, unspecified   Date of Injury/Surgery: unknown  Treatment Diagnosis:   M62.81 muscle weakness, R26.89 abnormality of gait   Insurance/Certification information:   [facility]  Referring Physician:    Dr Emily Maria of care signed (Y/N):  eval faxed  Outcome Measure: LEFs:  =   Visit# / total visits:   5/  Pain level: 1/10   Goals:     Short term goals to be achieved by 2020:  Short term goal 1: Pt will report compliance with current HEP as prescribed in order to improve LE strength and ROM. Short term goal 2: Pt will demonstrate B hip flexion greater than or equal to 3/5 in order to improve strength. Short term goal 3: Pt will demonstrate the ability to safely transfer sit to stand on the first attempt with 1 UE A in 3/4 trials in order to improve functional mobility and transfers. Short term goal 4: Pt will demonstrate the ability to safely stand x15 consecutive minutes while performing dynamic activities with supervision in order to improve endurance. Short term goal 5: Pt will demonstrate a a score of no more than ___ on the LEFs in order to improve quality of life. Subjective:  Patient states that she is feeling much better than she was the other day. Patient rates her pain /10   Any changes in Ambulatory Summary Sheet?   None    Objective: One seated rest break required to complete therapy  Prior to today's treatment session, patient was screened for signs and symptoms related to COVID-19 including but not limited to verbally answering questions related to feeling ill, cough, or SOB, along Modalities:  [x] Therapeutic Activity     [] Ultrasound  [] Estim  [x] Gait Training      [] Cervical Traction [] Lumbar Traction  [x] Neuromuscular Re-education    [] Cold/hotpack [] Iontophoresis   [x] Instruction in HEP      [] Vasopneumatic   [] Dry Needling    [] Manual Therapy               [] Aquatic Therapy              Electronically signed by:  Dianna Churchill   11/11/2020, 1:02 PM

## 2020-11-17 ENCOUNTER — HOSPITAL ENCOUNTER (OUTPATIENT)
Dept: PHYSICAL THERAPY | Age: 75
Setting detail: THERAPIES SERIES
Discharge: HOME | End: 2020-11-17
Payer: COMMERCIAL

## 2020-11-17 PROCEDURE — 97110 THERAPEUTIC EXERCISES: CPT

## 2020-11-17 NOTE — FLOWSHEET NOTE
Outpatient Physical Therapy  [place]           [] Phone: [phone]   Fax: [phone]  Nara campos           [x] Phone: [phone]   Fax: [phone]        Physical Therapy Daily Treatment Note  Date:  2020    Patient Name:  Oral Dillon    :  1945  MRN: [id number]  Restrictions/Precautions:  fall risk  Diagnosis:     G60.9 hereditary and idiopathic neuropathy, unspecified   Date of Injury/Surgery: unknown  Treatment Diagnosis:   M62.81 muscle weakness, R26.89 abnormality of gait   Insurance/Certification information:   [facility]  Referring Physician:    Dr Benitez Messing of care signed (Y/N):  eval faxed  Outcome Measure: LEFs:  =   Visit# / total visits:   6/  Pain level: 7/10 B knees   Goals:     Short term goals to be achieved by 2020:  Short term goal 1: Pt will report compliance with current HEP as prescribed in order to improve LE strength and ROM. Short term goal 2: Pt will demonstrate B hip flexion greater than or equal to 3/5 in order to improve strength. Short term goal 3: Pt will demonstrate the ability to safely transfer sit to stand on the first attempt with 1 UE A in 3/4 trials in order to improve functional mobility and transfers. Short term goal 4: Pt will demonstrate the ability to safely stand x15 consecutive minutes while performing dynamic activities with supervision in order to improve endurance. Short term goal 5: Pt will demonstrate a a score of no more than ___ on the LEFs in order to improve quality of life. Subjective:  Patient states that she was able to walk into [employer] the other day without difficulty. Tried to go to [place] the next day and was only able to make it through part of the store. Was disheartened by this. Having B knee pain rated 7/10. Any changes in Ambulatory Summary Sheet?   None    Objective: No LOB noted with activities on blue foam  Prior to today's treatment session, patient was screened for signs and symptoms related to COVID-19 including but not limited to verbally answering questions related to feeling ill, cough, or SOB, along with taking temperature via forehead thermometer. Patient presented with all negative signs and symptoms and had no fever >100 degrees Fahrenheit this date. Exercises: (No more than 4 columns)   Exercise/Equipment 11/4/2020 11/11/2020 11/17/2020           WARM UP      NuStep    x10' level 2 seat 13 UE 12 x10' level 2 seat 13 UE 12 x10' level 2 seat 13 UE 12         TABLE      LAQ 15x3\"  B LE 15x3\" B LE 15x3\" B LE    Seated marching 15x B LE 15x B LE    Adductor squeezes 20x 20x5\" 20x5\"   HS curls with T band (resisted knee flex) RTB 20x B LE  RTB 20x B LE RTB 20x B LE            STANDING      Hip Flexion, Extension, abduction 10x B LE 12x B LE 12x B LE   Marching 10x B LE 12x B LE 12x B LE   HS curls  12x B  LE 12x B LE   Heel raises 10x B LE  12x B LE  12x B LE                          PROPRIOCEPTION      Balance on Blue foam Cane flex, scap protraction/ret, circles CW/CCW  10x ea with SBA Cane flex, scap protraction/ret, circles CW/CCW  15x ea with SBA Cane flex, scap protraction/ret, circles CW/CCW  15x ea with SBA                           MODALITIES                    Other Therapeutic Activities/Education: Pt educated on standing at the table to fold laundry. Home Exercise Program:  Pt to continue current HEP and continue to address standing tolerance at home. Manual Treatments:  none    Modalities:  none    Communication with other providers:  eval faxed    Assessment:  (Response towards treatment session) (Pain Rating) Patient denied any pain in the knees after treatment.       Plan for Next Session:  Continue with general strengthening and balance training    Time In / Time Out: 1302/1335    Timed Code/Total Treatment Minutes:  35' 2 therapeutic exercise    Next Progress Note due:  11/30/2020    Plan of Care Interventions:  [x] Therapeutic Exercise  []

## 2020-11-20 ENCOUNTER — HOSPITAL ENCOUNTER (OUTPATIENT)
Dept: PHYSICAL THERAPY | Age: 75
Discharge: HOME | End: 2020-11-20

## 2020-11-20 NOTE — FLOWSHEET NOTE
Physical Therapy  Cancellation/No-show Note  Patient Name:  Oral Dillon  :  1945   Date:  2020  Cancelled visits to date: 0  No-shows to date: 0    For today's appointment patient:  [x]  Cancelled  []  Rescheduled appointment  []  No-show     Reason given by patient:  [x]  Patient ill  []  Conflicting appointment  []  No transportation    []  Conflict with work  []  No reason given  []  Other:     Comments:  Pt reports she is not feeling well.     Electronically signed by:  Regan Naranjo [id number]  2020, 2:37 PM

## 2020-11-23 ENCOUNTER — HOSPITAL ENCOUNTER (OUTPATIENT)
Dept: PHYSICAL THERAPY | Age: 75
Discharge: HOME | End: 2020-11-23

## 2020-11-25 ENCOUNTER — HOSPITAL ENCOUNTER (OUTPATIENT)
Dept: PHYSICAL THERAPY | Age: 75
Discharge: HOME | End: 2020-11-25

## 2020-11-25 NOTE — FLOWSHEET NOTE
Physical Therapy  Cancellation/No-show Note  Patient Name:  Cherylene Lek  :  1945   Date:  2020  Cancelled visits to date: 2  No-shows to date: 0    For today's appointment patient:  [x]  Cancelled  []  Rescheduled appointment  []  No-show     Reason given by patient:  [x]  Patient ill  []  Conflicting appointment  []  No transportation    []  Conflict with work  []  No reason given  []  Other:     Comments:  Pt reports she is not feeling well.     Electronically signed by:  Debra Khan   2020, 1:20 PM

## 2020-11-30 ENCOUNTER — HOSPITAL ENCOUNTER (OUTPATIENT)
Dept: PHYSICAL THERAPY | Age: 75
Setting detail: THERAPIES SERIES
Discharge: HOME | End: 2020-11-30
Payer: COMMERCIAL

## 2020-11-30 PROCEDURE — 97112 NEUROMUSCULAR REEDUCATION: CPT

## 2020-11-30 PROCEDURE — 97110 THERAPEUTIC EXERCISES: CPT

## 2020-11-30 NOTE — PROGRESS NOTES
Outpatient Physical Therapy        [x] Phone: [phone]  Fax: [phone]  Physician: Dr Carly Kaplan      From: Lady Burciaga, PT  DPT   Patient: Dionne Hodge                    : 1945  Diagnosis: G60.9 hereditary and idiopathic neuropathy, unspecified  Date: 2020  Treatment Diagnosis:  M62.81 muscle weakness, R26.89 abnormality of gait    [x]  Progress Note                []  Discharge Note    Total Visits to date:  7  Cancels/No-shows to date:  5    Subjective:  Pt reports she feels like she has made some progress. She reports she performs HEP daily. Plan of Care/Treatment to date:  [x] Therapeutic Exercise    [] Modalities:  [x] Therapeutic Activity     [] Ultrasound  [] Electric Stimulation  [x] Gait Training      [] Cervical Traction    [] Lumbar Traction  [x] Neuromuscular Re-education  [] Cold/hotpack [] Iontophoresis  [x] Instruction in HEP      Other:  [] Manual Therapy       []  Vasopneumatic  [] Aquatic Therapy       []                    ? Objective/Significant Findings At Last Visit/Comments:   LEFs:  = 33.75% ability = 66.25% disability.         Hip:    Right Left   Hip flexion         2/5        3-/5   Hip abd 5/5 5/5   Hip add 4+/5 4+/5      Assessment: Pt is a pleasant 74 yo female who would benefit from skilled PT to address decreased ROM, strength, balance, endurance, and functional mobility as well as transfer training. Goal Status:  [] Achieved [x] Partially Achieved  [] Not Achieved    Short term goals to be achieved by 2020:  Short term goal 1: Pt will report compliance with current HEP as prescribed in order to improve LE strength and ROM. - MET, discharge goal.   Short term goal 2: Pt will demonstrate B hip flexion greater than or equal to 3/5 in order to improve strength.  - not met, continue.    Short term goal 3: Pt will demonstrate the ability to safely transfer sit to stand on the first attempt with 1 UE A in 3/4 trials in order to improve functional mobility and transfers. - not met, continue. Short term goal 4: Pt will demonstrate the ability to safely stand x15 consecutive minutes while performing dynamic activities with supervision in order to improve endurance. - MET discharge goal.   Short term goal 5: Pt will demonstrate a a score of no more than 60% disability on the LEFs in order to improve quality of life. - not met, continue. Updated Goals to be achieved by January 11, 2021:  1) Pt will demonstrate I with current HEP as prescribed in order to increase LE strength. 2) Pt will demonstrate B hip flexion greater than or equal to 3/5 in order to improve strength. 3) Pt will demonstrate the ability to safely transfer sit to stand on the first attempt with 1 UE A in 3/4 trials in order to improve functional mobility and transfers.    4) Pt will report she is able to ambulate with mod I for 20 minutes in order to achieve personal goal and improve endurance. 5) Pt will demonstrate a a score of no more than 60% disability on the LEFs in order to improve quality of life. Frequency/Duration:  # Days per week: [x] 1+ day # Weeks: [] 1 week [] 4 weeks      [] 2 days? [] 2 weeks [] 5 weeks      [] 3 days   [] 3 weeks [x] 6 weeks     Rehab Potential: [] Excellent [x] Good [] Fair  [] Poor     Patient Status: [x] Continue per initial plan of Care     [] Patient now discharged     [x] Additional visits requested, Please re-certify for additional visits:      Requested frequency/duration:  1+/week for 6+ weeks    If we are requesting more visits, we fully anticipate the patient's condition is expected to improve within the treatment timeframe we are requesting. Electronically signed by:  Darby Riley, PT,DPT [id number]  11/30/2020, 2:21 PM    If you have any questions or concerns, please don't hesitate to call.   Thank you for your referral.    Physician Signature:______________________ Date:______ Time: ________  By signing above,

## 2020-11-30 NOTE — FLOWSHEET NOTE
Hip add 4+/5 4+/5     Prior to today's treatment session, patient was screened for signs and symptoms related to COVID-19 including but not limited to verbally answering questions related to feeling ill, cough, or SOB, along with taking temperature via forehead thermometer. Patient presented with all negative signs and symptoms and had no fever >100 degrees Fahrenheit this date. Exercises: (No more than 4 columns)   Exercise/Equipment 11/4/2020 11/11/2020 11/17/2020 11/30/2020            WARM UP       NuStep    x10' level 2 seat 13 UE 12 x10' level 2 seat 13 UE 12 x10' level 2 seat 13 UE 12 x10' level 3 seat 13 UE 12          TABLE       LAQ 15x3\"  B LE 15x3\" B LE 15x3\" B LE  1x15 B LE with 3\" hold   Seated marching 15x B LE 15x B LE  1x15 B LE   Adductor squeezes 20x 20x5\" 20x5\"    HS curls with T band (resisted knee flex) RTB 20x B LE  RTB 20x B LE RTB 20x B LE RTB 1x20 B LE             STANDING       Hip Flexion, Extension, abduction 10x B LE 12x B LE 12x B LE 15x B LE   Marching 10x B LE 12x B LE 12x B LE 1x15 B LE on foam pad   HS curls  12x B  LE 12x B LE 1x15 B LE   Heel raises 10x B LE  12x B LE  12x B LE 1x15 B LE   Calf stretch    2x30\" B LE on angled board. Sit to stand    1x5 with UE A               PROPRIOCEPTION       Balance on Blue foam Cane flex, scap protraction/ret, circles CW/CCW  10x ea with SBA Cane flex, scap protraction/ret, circles CW/CCW  15x ea with SBA Cane flex, scap protraction/ret, circles CW/CCW  15x ea with SBA Cane flex, scap protraction/ret, circles CW/CCW  15x ea with SBA                               MODALITIES                       Other Therapeutic Activities/Education: Pt educated on continuing her HEP and her improvements. Home Exercise Program:  Pt to continue current HEP and continue to address standing tolerance at home. Manual Treatments:  none    Modalities:  none    Communication with other providers:  Updated POC to be faxed.      Assessment:  (Response towards treatment session) (Pain Rating) 3/10  Pt is a pleasant 77 yo female who would benefit from skilled PT to address decreased ROM, strength, balance, endurance, and functional mobility.      Plan for Next Session:  Continue with general strengthening and balance training    Time In / Time Out: 1:10-2:05  pm    Timed Code/Total Treatment Minutes: 55 minutes/ 1 neuro re-ed, 3 therapeutic exercise    Next Progress Note due:  11/30/2020    Plan of Care Interventions:  [x] Therapeutic Exercise  [] Modalities:  [x] Therapeutic Activity     [] Ultrasound  [] Estim  [x] Gait Training      [] Cervical Traction [] Lumbar Traction  [x] Neuromuscular Re-education    [] Cold/hotpack [] Iontophoresis   [x] Instruction in HEP      [] Vasopneumatic   [] Dry Needling    [] Manual Therapy               [] Aquatic Therapy              Electronically signed by:  Soumya Florence DPT [id number]    11/30/2020, 1:12 PM

## 2020-12-02 ENCOUNTER — HOSPITAL ENCOUNTER (OUTPATIENT)
Dept: PHYSICAL THERAPY | Age: 75
Discharge: HOME | End: 2020-12-02

## 2020-12-02 NOTE — FLOWSHEET NOTE
Physical Therapy  Cancellation/No-show Note  Patient Name:  Jeri Ellis  :  1945   Date:  2020  Cancelled visits to date: 3  No-shows to date: 0    For today's appointment patient:  [x]  Cancelled  []  Rescheduled appointment  []  No-show     Reason given by patient:  []  Patient ill  []  Conflicting appointment  []  No transportation    []  Conflict with work  [x]  No reason given  []  Other:     Comments:   Electronically signed by:  Heidi Alan   2020, 12:51 PM Render Path Notes In Note?: No Anesthesia Volume In Cc (Will Not Render If 0): 0.5 Dressing: bandage Consent: Written consent was obtained and risks were reviewed including but not limited to scarring, infection, bleeding, scabbing, incomplete removal, nerve damage and allergy to anesthesia. Anesthesia Type: 1% lidocaine with 1:100,000 epinephrine Biopsy Type: H and E Post-Care Instructions: I reviewed with the patient in detail post-care instructions. Patient is to keep the biopsy site dry overnight, and then apply bacitracin twice daily until healed. Patient may apply hydrogen peroxide soaks to remove any crusting. Size Of Lesion In Cm: 1 Anticipated Plan (Based On Presumed Biopsy Results): Refer for Mohs with Dr. Guerin if positive CA Depth Of Biopsy: dermis Silver Nitrate Text: The wound bed was treated with silver nitrate after the biopsy was performed. Detail Level: Detailed X Size Of Lesion In Cm: 0 Was A Bandage Applied: Yes Notification Instructions: Patient will be notified of biopsy results. However, patient instructed to call the office if not contacted within 2 weeks. Electrodesiccation And Curettage Text: The wound bed was treated with electrodesiccation and curettage after the biopsy was performed. Hemostasis: Electrodesiccation Body Location Override (Optional - Billing Will Still Be Based On Selected Body Map Location If Applicable): right superior helix Biopsy Method: double edge Personna blade Wound Care: Polysporin ointment Electrodesiccation Text: The wound bed was treated with electrodesiccation after the biopsy was performed. Type Of Destruction Used: Curettage Billing Type: Third-Party Bill Cryotherapy Text: The wound bed was treated with cryotherapy after the biopsy was performed.

## 2020-12-07 ENCOUNTER — HOSPITAL ENCOUNTER (OUTPATIENT)
Dept: PHYSICAL THERAPY | Age: 75
Discharge: HOME | End: 2020-12-07

## 2020-12-07 NOTE — FLOWSHEET NOTE
Physical Therapy  Cancellation/No-show Note  Patient Name:  Raisa Willis  :  1945   Date:  2020  Cancelled visits to date: 4  No-shows to date: 0    For today's appointment patient:  [x]  Cancelled  []  Rescheduled appointment  []  No-show     Reason given by patient:  []  Patient ill  []  Conflicting appointment  []  No transportation    []  Conflict with work  [x]  No reason given  []  Other:     Comments:   Electronically signed by:  Checo Blackmon   2020, 11:00 AM

## 2020-12-09 ENCOUNTER — HOSPITAL ENCOUNTER (OUTPATIENT)
Dept: PHYSICAL THERAPY | Age: 75
Discharge: HOME | End: 2020-12-09

## 2020-12-09 NOTE — FLOWSHEET NOTE
Physical Therapy  Cancellation/No-show Note  Patient Name:  Cleopatra Zapien  :  1945   Date:  2020  Cancelled visits to date: 5  No-shows to date: 0    For today's appointment patient:  [x]  Cancelled  []  Rescheduled appointment  []  No-show     Reason given by patient:  []  Patient ill  []  Conflicting appointment  []  No transportation    []  Conflict with work  []  No reason given  [x]  Other:  See note from front office staff.      Comments:   Electronically signed by:  Cecilia Phillips   2020, 3:19 PM

## 2020-12-09 NOTE — FLOWSHEET NOTE
Patient cx appointment today. After talking with patient we decided to remove her from the schedule until she is feeling better and able to come to therapy consistently. States she will call when she is ready to get back on the schedule.

## 2020-12-10 NOTE — FLOWSHEET NOTE
Pt is currently on hold for skilled PT at this time secondary to poor health and inconsistent attendance.      Norma Cali PT DPT [id number]

## 2020-12-22 ENCOUNTER — OFFICE VISIT (OUTPATIENT)
Dept: FAMILY MEDICINE CLINIC | Age: 75
End: 2020-12-22
Payer: COMMERCIAL

## 2020-12-22 VITALS
DIASTOLIC BLOOD PRESSURE: 74 MMHG | TEMPERATURE: 97.1 F | HEART RATE: 63 BPM | SYSTOLIC BLOOD PRESSURE: 136 MMHG | OXYGEN SATURATION: 99 % | BODY MASS INDEX: 43.4 KG/M2 | HEIGHT: 69 IN | RESPIRATION RATE: 16 BRPM | WEIGHT: 293 LBS

## 2020-12-22 PROCEDURE — G0439 PPPS, SUBSEQ VISIT: HCPCS | Performed by: NURSE PRACTITIONER

## 2020-12-22 RX ORDER — VENLAFAXINE HYDROCHLORIDE 150 MG/1
150 CAPSULE, EXTENDED RELEASE ORAL DAILY
Qty: 30 CAPSULE | Refills: 3 | Status: SHIPPED | OUTPATIENT
Start: 2020-12-22 | End: 2021-02-09 | Stop reason: SDUPTHER

## 2020-12-22 RX ORDER — PROMETHAZINE HYDROCHLORIDE 12.5 MG/1
12.5 TABLET ORAL 3 TIMES DAILY PRN
Qty: 12 TABLET | Refills: 0 | Status: SHIPPED | OUTPATIENT
Start: 2020-12-22 | End: 2020-12-29

## 2020-12-22 ASSESSMENT — PATIENT HEALTH QUESTIONNAIRE - PHQ9
SUM OF ALL RESPONSES TO PHQ9 QUESTIONS 1 & 2: 0
1. LITTLE INTEREST OR PLEASURE IN DOING THINGS: 0
2. FEELING DOWN, DEPRESSED OR HOPELESS: 0
SUM OF ALL RESPONSES TO PHQ QUESTIONS 1-9: 0

## 2020-12-22 ASSESSMENT — LIFESTYLE VARIABLES: HOW OFTEN DO YOU HAVE A DRINK CONTAINING ALCOHOL: 0

## 2020-12-22 NOTE — PROGRESS NOTES
SUJEY Wilson - CNP as PCP - [facility] Empaneled Provider  Isabella Ahuja MD as Consulting Physician (Internal Medicine)    Wt Readings from Last 3 Encounters:   12/22/20 295 lb (133.8 kg)   10/16/20 297 lb (134.7 kg)   06/16/20 297 lb 2 oz (134.8 kg)     Vitals:    12/22/20 1108 12/22/20 1130   BP: (!) 150/86 136/74   Site: Right Upper Arm    Position: Sitting    Cuff Size: Medium Adult    Pulse: 63    Resp: 16    Temp: 97.1 °F (36.2 °C)    TempSrc: Infrared    SpO2: 99%    Weight: 295 lb (133.8 kg)    Height: 5' 9.25\" (1.759 m)      Body mass index is 43.25 kg/m². Based upon direct observation of the patient, evaluation of cognition reveals recent and remote memory intact. Patient's complete Health Risk Assessment and screening values have been reviewed and are found in Flowsheets. The following problems were reviewed today and where indicated follow up appointments were made and/or referrals ordered. Positive Risk Factor Screenings with Interventions:     Fall Risk:  2 or more falls in past year?: (!) yes  Fall with injury in past year?: (!) yes  Fall Risk Interventions:    · Home safety tips provided  · Patient declines any further evaluation/treatment for this issue   Cognitive Impairment Interventions:  · Patient declines any further evaluation/treatment for cognitive impairment         General Health and ACP:  General  In general, how would you say your health is?: Very Good  In the past 7 days, have you experienced any of the following?  New or Increased Pain, New or Increased Fatigue, Loneliness, Social Isolation, Stress or Anger?: (!) New or Increased Pain  Do you get the social and emotional support that you need?: Yes  Do you have a Living Will?: (!) No  Advance Directives     Power of  Living Will ACP-Advance Directive ACP-Power of     Not on File Not on File Not on File Not on File      General Health Risk Interventions: · Pain issues: home exercises provided, medication adjusted- will increase Effexor to 150 mg daily for neuropathic pain benefit. She is concerned that with increased Effexor dose she may initially experience nausea - she would like anti-emetic. She is unable to tolerate Zofran.      Health Habits/Nutrition:  Health Habits/Nutrition  Do you exercise for at least 20 minutes 2-3 times per week?: Yes  Have you lost any weight without trying in the past 3 months?: No  Do you eat fewer than 2 meals per day?: No  Have you seen a dentist within the past year?: Yes  Body mass index: (!) 43.25  Health Habits/Nutrition Interventions:  · Inadequate physical activity:  patient is not ready to increase his/her physical activity level at this time  · Nutritional issues:  educational materials for healthy, well-balanced diet provided    Hearing/Vision:  No exam data present  Hearing/Vision  Do you or your family notice any trouble with your hearing?: (!) Yes  Do you have difficulty driving, watching TV, or doing any of your daily activities because of your eyesight?: No  Have you had an eye exam within the past year?: Yes    Safety:  Safety  Do you have working smoke detectors?: Yes  Have all throw rugs been removed or fastened?: Yes  Do you have non-slip mats or surfaces in all bathtubs/showers?: Yes  Do all of your stairways have a railing or banister?: (!) No  Are your doorways, halls and stairs free of clutter?: Yes  Do you always fasten your seatbelt when you are in a car?: Yes     Personalized Preventive Plan   Current Health Maintenance Status  Immunization History   Administered Date(s) Administered    Influenza, Quadv, IM, (6 mo and older Fluzone, Flulaval, Fluarix and 3 yrs and older Afluria) 01/15/2018, 09/18/2018    Influenza, Quadv, IM, PF (6 mo and older Fluzone, Flulaval, Fluarix, and 3 yrs and older Afluria) 10/09/2019, 10/16/2020    Pneumococcal Conjugate 13-valent ([place] ) 09/11/2017  Pneumococcal Polysaccharide ([id number]) 09/18/2018    Tdap (Boostrix, Adacel) 10/16/2020        Health Maintenance   Topic Date Due    Annual Wellness Visit (AWV)  06/23/2019    Shingles Vaccine (1 of 2) 10/16/2021 (Originally 11/5/1995)    Lipid screen  06/17/2021    A1C test (Diabetic or Prediabetic)  10/20/2021    TSH testing  10/20/2021    Colon cancer screen colonoscopy  09/11/2023    DTaP/Tdap/Td vaccine (2 - Td) 10/16/2030    DEXA (modify frequency per FRAX score)  Completed    Flu vaccine  Completed    Pneumococcal 65+ years Vaccine  Completed    Hepatitis C screen  Completed    Hepatitis A vaccine  Aged Out    Hepatitis B vaccine  Aged Out    Hib vaccine  Aged Out    Meningococcal (ACWY) vaccine  Aged Out     Recommendations for I-Stand Due: see orders and patient instructions/AVS.  . Recommended screening schedule for the next 5-10 years is provided to the patient in written form: see Patient Anni Castaneda was seen today for medicare awv and peripheral neuropathy. Diagnoses and all orders for this visit:    Routine general medical examination at a health care facility  Continue efforts at regular exercise, healthy diet and adequate sleep. Polyneuropathy, peripheral sensorimotor axonal  Will increase Effexor for neuropathic pain benefit. Phenergan to treat SE (nausea). Will follow up in 1 month. Patient encouraged to follow up with Neurology as scheduled. -     venlafaxine (EFFEXOR XR) 150 MG extended release capsule; Take 1 capsule by mouth daily  -     promethazine (PHENERGAN) 12.5 MG tablet;  Take 1 tablet by mouth 3 times daily as needed for Nausea

## 2020-12-22 NOTE — PATIENT INSTRUCTIONS
Personalized Preventive Plan for Maru Stains - 12/22/2020  Medicare offers a range of preventive health benefits. Some of the tests and screenings are paid in full while other may be subject to a deductible, co-insurance, and/or copay. Some of these benefits include a comprehensive review of your medical history including lifestyle, illnesses that may run in your family, and various assessments and screenings as appropriate. After reviewing your medical record and screening and assessments performed today your provider may have ordered immunizations, labs, imaging, and/or referrals for you. A list of these orders (if applicable) as well as your Preventive Care list are included within your After Visit Summary for your review. Other Preventive Recommendations:    · A preventive eye exam performed by an eye specialist is recommended every 1-2 years to screen for glaucoma; cataracts, macular degeneration, and other eye disorders. · A preventive dental visit is recommended every 6 months. · Try to get at least 150 minutes of exercise per week or 10,000 steps per day on a pedometer . · Order or download the FREE \"Exercise & Physical Activity: Your Everyday Guide\" from [employer]. Call 8-452.880.3449 or search [employer] online. · You need 9697-1636 mg of calcium and 3014-6031 IU of vitamin D per day. It is possible to meet your calcium requirement with diet alone, but a vitamin D supplement is usually necessary to meet this goal.  · When exposed to the sun, use a sunscreen that protects against both UVA and UVB radiation with an SPF of 30 or greater. Reapply every 2 to 3 hours or after sweating, drying off with a towel, or swimming. · Always wear a seat belt when traveling in a car. Always wear a helmet when riding a bicycle or motorcycle.

## 2021-02-09 DIAGNOSIS — G60.8 POLYNEUROPATHY, PERIPHERAL SENSORIMOTOR AXONAL: ICD-10-CM

## 2021-02-09 DIAGNOSIS — M54.50 CHRONIC BILATERAL LOW BACK PAIN WITHOUT SCIATICA: ICD-10-CM

## 2021-02-09 DIAGNOSIS — G89.29 CHRONIC BILATERAL LOW BACK PAIN WITHOUT SCIATICA: ICD-10-CM

## 2021-02-09 RX ORDER — BACLOFEN 10 MG/1
10 TABLET ORAL NIGHTLY PRN
Qty: 20 TABLET | Refills: 2 | Status: SHIPPED | OUTPATIENT
Start: 2021-02-09 | End: 2021-05-21 | Stop reason: SDUPTHER

## 2021-02-09 RX ORDER — VENLAFAXINE HYDROCHLORIDE 150 MG/1
150 CAPSULE, EXTENDED RELEASE ORAL DAILY
Qty: 30 CAPSULE | Refills: 3 | Status: SHIPPED | OUTPATIENT
Start: 2021-02-09 | End: 2021-05-21 | Stop reason: SDUPTHER

## 2021-02-13 NOTE — PROGRESS NOTES
Outpatient Physical Therapy        [x] Phone: [phone]  Fax: [phone]  Physician: Dr Angela Barreto      From: Michael Mcdaniel, PT    Patient: Kandi Bryant                    : 1945  Diagnosis: G60.9 hereditary and idiopathic neuropathy, unspecified  Date: 2021  Treatment Diagnosis:  M62.81 muscle weakness, R26.89 abnormality of gait    []  Progress Note                [x]  Discharge Note    Total Visits to date:  7 planned for up to 13  Cancels/No-shows to date:  5    Subjective:  Pt reports she feels like she has made some progress. She reports she performs HEP daily. Plan of Care/Treatment to date:  [x] Therapeutic Exercise    [] Modalities:  [x] Therapeutic Activity     [] Ultrasound  [] Electric Stimulation  [x] Gait Training      [] Cervical Traction    [] Lumbar Traction  [x] Neuromuscular Re-education  [] Cold/hotpack [] Iontophoresis  [x] Instruction in HEP      Other:  [] Manual Therapy       []  Vasopneumatic  [] Aquatic Therapy       []                    ? Objective/Significant Findings At Last Visit/Comments:   LEFs:  = 33.75% ability = 66.25% disability.         Hip:    Right Left   Hip flexion         2/5        3-/5   Hip abd 5/5 5/5   Hip add 4+/5 4+/5        Goal Status:  [] Achieved [x] Partially Achieved  [] Not Achieved    Short term goals to be achieved by 2020:  Short term goal 1: Pt will report compliance with current HEP as prescribed in order to improve LE strength and ROM. - MET, discharge goal.   Short term goal 2: Pt will demonstrate B hip flexion greater than or equal to 3/5 in order to improve strength.  - not met, continue. Short term goal 3: Pt will demonstrate the ability to safely transfer sit to stand on the first attempt with 1 UE A in 3/4 trials in order to improve functional mobility and transfers. - not met, continue. Short term goal 4: Pt will demonstrate the ability to safely stand x15 consecutive minutes while performing dynamic activities with supervision in order to improve endurance. - MET discharge goal.   Short term goal 5: Pt will demonstrate a a score of no more than 60% disability on the LEFs in order to improve quality of life. - not met, continue. [x] Patient now discharged- has not scheduled further OP PT      Electronically signed by:  Araceli Barbosa PT, 2/13/2021, 11:23 AM    If you have any questions or concerns, please don't hesitate to call.   Thank you for your referral.

## 2021-05-21 ENCOUNTER — TELEPHONE (OUTPATIENT)
Dept: FAMILY MEDICINE CLINIC | Age: 76
End: 2021-05-21

## 2021-05-21 ENCOUNTER — OFFICE VISIT (OUTPATIENT)
Dept: FAMILY MEDICINE CLINIC | Age: 76
End: 2021-05-21
Payer: COMMERCIAL

## 2021-05-21 VITALS
DIASTOLIC BLOOD PRESSURE: 76 MMHG | RESPIRATION RATE: 15 BRPM | OXYGEN SATURATION: 98 % | WEIGHT: 278.6 LBS | TEMPERATURE: 97.6 F | BODY MASS INDEX: 40.85 KG/M2 | HEART RATE: 68 BPM | SYSTOLIC BLOOD PRESSURE: 132 MMHG

## 2021-05-21 DIAGNOSIS — F33.41 RECURRENT MAJOR DEPRESSIVE DISORDER, IN PARTIAL REMISSION (HCC): Primary | ICD-10-CM

## 2021-05-21 DIAGNOSIS — I10 ESSENTIAL HYPERTENSION: ICD-10-CM

## 2021-05-21 DIAGNOSIS — G89.29 CHRONIC BILATERAL LOW BACK PAIN WITHOUT SCIATICA: ICD-10-CM

## 2021-05-21 DIAGNOSIS — M54.50 CHRONIC BILATERAL LOW BACK PAIN WITHOUT SCIATICA: ICD-10-CM

## 2021-05-21 DIAGNOSIS — G60.8 POLYNEUROPATHY, PERIPHERAL SENSORIMOTOR AXONAL: ICD-10-CM

## 2021-05-21 PROCEDURE — 99214 OFFICE O/P EST MOD 30 MIN: CPT | Performed by: NURSE PRACTITIONER

## 2021-05-21 RX ORDER — VENLAFAXINE HYDROCHLORIDE 150 MG/1
150 CAPSULE, EXTENDED RELEASE ORAL DAILY
Qty: 90 CAPSULE | Refills: 2 | Status: SHIPPED | OUTPATIENT
Start: 2021-05-21 | End: 2022-01-10

## 2021-05-21 RX ORDER — BACLOFEN 10 MG/1
10 TABLET ORAL NIGHTLY PRN
Qty: 20 TABLET | Refills: 5 | Status: SHIPPED | OUTPATIENT
Start: 2021-05-21 | End: 2021-05-24 | Stop reason: SDUPTHER

## 2021-05-21 SDOH — ECONOMIC STABILITY: FOOD INSECURITY: WITHIN THE PAST 12 MONTHS, THE FOOD YOU BOUGHT JUST DIDN'T LAST AND YOU DIDN'T HAVE MONEY TO GET MORE.: NEVER TRUE

## 2021-05-21 SDOH — ECONOMIC STABILITY: FOOD INSECURITY: WITHIN THE PAST 12 MONTHS, YOU WORRIED THAT YOUR FOOD WOULD RUN OUT BEFORE YOU GOT MONEY TO BUY MORE.: NEVER TRUE

## 2021-05-21 ASSESSMENT — PATIENT HEALTH QUESTIONNAIRE - PHQ9
3. TROUBLE FALLING OR STAYING ASLEEP: 1
SUM OF ALL RESPONSES TO PHQ QUESTIONS 1-9: 2
SUM OF ALL RESPONSES TO PHQ QUESTIONS 1-9: 2
SUM OF ALL RESPONSES TO PHQ9 QUESTIONS 1 & 2: 0
1. LITTLE INTEREST OR PLEASURE IN DOING THINGS: 0
SUM OF ALL RESPONSES TO PHQ QUESTIONS 1-9: 2
10. IF YOU CHECKED OFF ANY PROBLEMS, HOW DIFFICULT HAVE THESE PROBLEMS MADE IT FOR YOU TO DO YOUR WORK, TAKE CARE OF THINGS AT HOME, OR GET ALONG WITH OTHER PEOPLE: 1

## 2021-05-21 NOTE — PROGRESS NOTES
Subjective:      Chief Complaint   Patient presents with    Follow-up     Patient presents today for a 4 month follow up. HPI:  Ervin Robert is a 76 y.o. female who presents today for 4 month follow up. Depression  She reports depression is well controlled with Effexor 150 mg daily. Patient denies AVH/SIB/SI/HI.       Hypertension  Patient is here for follow-up of elevated blood pressure. She is not exercising and is adherent to a low-salt diet. Blood pressure is well controlled at home - averages 120/70's. She denies chest pain, chest pressure/discomfort, claudication, dyspnea, exertional chest pressure/discomfort, fatigue, irregular heart beat, near-syncope, orthopnea, palpitations, paroxysmal nocturnal dyspnea, syncope or edema.       Neuropathy  She describes symptoms of burning and stinging in both feet; left is worse than the right. Onset of symptoms was about 1 year ago. Symptoms are currently of moderate severity. Symptoms occur all of the time. EMG showed sensorimotor polyneuropathy. She has a hx of Sjourn's and hypothyroidism. She has not seen Neurology in [place]. \"  She denies recent falls. Past Medical History:   Diagnosis Date    [facility]     ovarian - contained    GERD (gastroesophageal reflux disease)     Hepatitis A     Hyperlipidemia     Hypertension     Hypothyroidism     Sjogren's disease ([facility]         Social History     Tobacco Use    Smoking status: Former Smoker     Packs/day: 1.50     Years: 15.00     Pack years: 22.50     Types: Cigarettes     Quit date: 10/1/1970     Years since quittin.6    Smokeless tobacco: Never Used   Substance Use Topics    Alcohol use: Yes     Comment: rarely        Review of Systems   Constitutional: Negative for activity change, appetite change, fatigue and unexpected weight change. HENT: Negative. Eyes: Negative. Respiratory: Negative. Cardiovascular: Negative. Gastrointestinal: Negative.     Musculoskeletal: Positive for arthralgias, back pain (chronic lower back pain) and gait problem (using a cane for assistance). Negative for joint swelling. Skin: Negative for color change and rash. Neurological: Positive for numbness (bilateral LEs). Negative for dizziness, tremors, seizures, syncope, facial asymmetry, speech difficulty, weakness, light-headedness and headaches. Psychiatric/Behavioral: Negative. Negative for agitation, decreased concentration, dysphoric mood, sleep disturbance and suicidal ideas. The patient is not nervous/anxious. Objective:      /76 (Site: Right Upper Arm, Position: Sitting, Cuff Size: Large Adult)   Pulse 68   Temp 97.6 °F (36.4 °C) (Temporal)   Resp 15   Wt 278 lb 9.6 oz (126.4 kg)   SpO2 98%   BMI 40.85 kg/m²      Physical Exam  Vitals reviewed. Constitutional:       Appearance: Normal appearance. She is obese. HENT:      Head: Normocephalic. Right Ear: External ear normal.      Left Ear: External ear normal.      Nose: Nose normal.      Mouth/Throat:      Mouth: Mucous membranes are moist.      Pharynx: Oropharynx is clear. Eyes:      Conjunctiva/sclera: Conjunctivae normal.      Pupils: Pupils are equal, round, and reactive to light. Neck:      Vascular: No carotid bruit. Cardiovascular:      Rate and Rhythm: Normal rate and regular rhythm. Heart sounds: Normal heart sounds. Pulmonary:      Effort: Pulmonary effort is normal.      Breath sounds: Normal breath sounds. Abdominal:      General: Bowel sounds are normal. There is no distension. Palpations: Abdomen is soft. Tenderness: There is no abdominal tenderness. Musculoskeletal:      Cervical back: Normal range of motion and neck supple. Right lower leg: No edema. Left lower leg: No edema. Skin:     General: Skin is warm and dry. Findings: No bruising. Neurological:      General: No focal deficit present.       Mental Status: She is alert and oriented to person, place, and time. Psychiatric:         Mood and Affect: Mood normal.         Behavior: Behavior normal.         Judgment: Judgment normal.            Assessment / Plan:      1. Recurrent major depressive disorder, in partial remission (HCC)  Stable. Continue medication. Patient to call if any concerns or SI before her follow up appointment. If she develops suicidal thoughts with a plan, she is instructed to go to emergency room immediately. Behavioral counseling recommended. - venlafaxine (EFFEXOR XR) 150 MG extended release capsule; Take 1 capsule by mouth daily  Dispense: 90 capsule; Refill: 2    2. Essential hypertension  BP well controlled, continue current medication. Monitor blood pressures. Goal is 130/80 or less. Bring your blood pressure recordings to your next appointment, or you can send them to us. Bring your home blood pressure machine with you to your next appointment. Exercise moderately, 30-45 minutes most days of the week. Lose weight if overweight. Increase your daily intake of grains, fresh fruits and vegetables. Reduce dietary sodium; less than 2.4 grams per day. If you smoke stop smoking. Limit alcohol intake. Reduce stress level    3. Chronic bilateral low back pain without sciatica  Continue baclofen at  prn for muscle spasm/pain. Continue to work on weight loss.      4. Polyneuropathy, peripheral sensorimotor axonal  Recommend follow up with Neurology          SUJEY Brice - CNP

## 2021-05-24 DIAGNOSIS — G89.29 CHRONIC BILATERAL LOW BACK PAIN WITHOUT SCIATICA: ICD-10-CM

## 2021-05-24 DIAGNOSIS — M54.50 CHRONIC BILATERAL LOW BACK PAIN WITHOUT SCIATICA: ICD-10-CM

## 2021-05-24 RX ORDER — BACLOFEN 10 MG/1
10 TABLET ORAL NIGHTLY PRN
Qty: 90 TABLET | Refills: 2 | Status: SHIPPED | OUTPATIENT
Start: 2021-05-24 | End: 2021-08-22

## 2021-05-25 ASSESSMENT — ENCOUNTER SYMPTOMS
EYES NEGATIVE: 1
RESPIRATORY NEGATIVE: 1
GASTROINTESTINAL NEGATIVE: 1
BACK PAIN: 1
COLOR CHANGE: 0

## 2021-08-06 DIAGNOSIS — I10 ESSENTIAL HYPERTENSION: ICD-10-CM

## 2021-08-06 DIAGNOSIS — E78.2 MIXED HYPERLIPIDEMIA: ICD-10-CM

## 2021-08-06 RX ORDER — SIMVASTATIN 20 MG
TABLET ORAL
Qty: 90 TABLET | Refills: 2 | Status: SHIPPED | OUTPATIENT
Start: 2021-08-06 | End: 2022-03-03

## 2021-08-06 RX ORDER — CARVEDILOL 12.5 MG/1
12.5 TABLET ORAL 2 TIMES DAILY
Qty: 180 TABLET | Refills: 2 | Status: SHIPPED | OUTPATIENT
Start: 2021-08-06 | End: 2022-03-03

## 2021-08-20 ENCOUNTER — OFFICE VISIT (OUTPATIENT)
Dept: FAMILY MEDICINE CLINIC | Age: 76
End: 2021-08-20
Payer: COMMERCIAL

## 2021-08-20 VITALS
RESPIRATION RATE: 16 BRPM | HEART RATE: 76 BPM | WEIGHT: 279 LBS | OXYGEN SATURATION: 95 % | BODY MASS INDEX: 40.9 KG/M2 | SYSTOLIC BLOOD PRESSURE: 136 MMHG | TEMPERATURE: 97.4 F | DIASTOLIC BLOOD PRESSURE: 81 MMHG

## 2021-08-20 DIAGNOSIS — I10 ESSENTIAL HYPERTENSION: Primary | ICD-10-CM

## 2021-08-20 DIAGNOSIS — R94.31 ABNORMAL EKG: ICD-10-CM

## 2021-08-20 DIAGNOSIS — R73.01 IFG (IMPAIRED FASTING GLUCOSE): ICD-10-CM

## 2021-08-20 LAB
A/G RATIO: 2.1 (ref 1.1–2.2)
ALBUMIN SERPL-MCNC: 4.2 G/DL (ref 3.4–5)
ALP BLD-CCNC: 104 U/L (ref 40–129)
ALT SERPL-CCNC: 19 U/L (ref 10–40)
ANION GAP SERPL CALCULATED.3IONS-SCNC: 15 MMOL/L (ref 3–16)
AST SERPL-CCNC: 25 U/L (ref 15–37)
BASOPHILS ABSOLUTE: 0 K/UL (ref 0–0.2)
BASOPHILS RELATIVE PERCENT: 0.2 %
BILIRUB SERPL-MCNC: 0.4 MG/DL (ref 0–1)
BUN BLDV-MCNC: 9 MG/DL (ref 7–20)
CALCIUM SERPL-MCNC: 9.3 MG/DL (ref 8.3–10.6)
CHLORIDE BLD-SCNC: 102 MMOL/L (ref 99–110)
CHOLESTEROL, FASTING: 116 MG/DL (ref 0–199)
CO2: 26 MMOL/L (ref 21–32)
CREAT SERPL-MCNC: 0.6 MG/DL (ref 0.6–1.2)
EOSINOPHILS ABSOLUTE: 0 K/UL (ref 0–0.6)
EOSINOPHILS RELATIVE PERCENT: 0.1 %
GFR AFRICAN AMERICAN: >60
GFR NON-AFRICAN AMERICAN: >60
GLOBULIN: 2 G/DL
GLUCOSE FASTING: 138 MG/DL (ref 70–99)
HCT VFR BLD CALC: 42.5 % (ref 36–48)
HDLC SERPL-MCNC: 48 MG/DL (ref 40–60)
HEMOGLOBIN: 14.5 G/DL (ref 12–16)
LDL CHOLESTEROL CALCULATED: 39 MG/DL
LYMPHOCYTES ABSOLUTE: 1.4 K/UL (ref 1–5.1)
LYMPHOCYTES RELATIVE PERCENT: 25 %
MCH RBC QN AUTO: 33.9 PG (ref 26–34)
MCHC RBC AUTO-ENTMCNC: 34.2 G/DL (ref 31–36)
MCV RBC AUTO: 99.1 FL (ref 80–100)
MONOCYTES ABSOLUTE: 0.5 K/UL (ref 0–1.3)
MONOCYTES RELATIVE PERCENT: 9.2 %
NEUTROPHILS ABSOLUTE: 3.7 K/UL (ref 1.7–7.7)
NEUTROPHILS RELATIVE PERCENT: 65.5 %
PDW BLD-RTO: 13 % (ref 12.4–15.4)
PLATELET # BLD: 242 K/UL (ref 135–450)
PMV BLD AUTO: 8.6 FL (ref 5–10.5)
POTASSIUM SERPL-SCNC: 4.6 MMOL/L (ref 3.5–5.1)
RBC # BLD: 4.29 M/UL (ref 4–5.2)
SODIUM BLD-SCNC: 143 MMOL/L (ref 136–145)
TOTAL PROTEIN: 6.2 G/DL (ref 6.4–8.2)
TRIGLYCERIDE, FASTING: 146 MG/DL (ref 0–150)
VLDLC SERPL CALC-MCNC: 29 MG/DL
WBC # BLD: 5.6 K/UL (ref 4–11)

## 2021-08-20 PROCEDURE — 99213 OFFICE O/P EST LOW 20 MIN: CPT | Performed by: NURSE PRACTITIONER

## 2021-08-20 PROCEDURE — 93000 ELECTROCARDIOGRAM COMPLETE: CPT | Performed by: NURSE PRACTITIONER

## 2021-08-20 PROCEDURE — 36415 COLL VENOUS BLD VENIPUNCTURE: CPT | Performed by: NURSE PRACTITIONER

## 2021-08-20 ASSESSMENT — ANXIETY QUESTIONNAIRES
GAD7 TOTAL SCORE: 1
1. FEELING NERVOUS, ANXIOUS, OR ON EDGE: 0
6. BECOMING EASILY ANNOYED OR IRRITABLE: 0
3. WORRYING TOO MUCH ABOUT DIFFERENT THINGS: 0
IF YOU CHECKED OFF ANY PROBLEMS ON THIS QUESTIONNAIRE, HOW DIFFICULT HAVE THESE PROBLEMS MADE IT FOR YOU TO DO YOUR WORK, TAKE CARE OF THINGS AT HOME, OR GET ALONG WITH OTHER PEOPLE: SOMEWHAT DIFFICULT
4. TROUBLE RELAXING: 1
2. NOT BEING ABLE TO STOP OR CONTROL WORRYING: 0
5. BEING SO RESTLESS THAT IT IS HARD TO SIT STILL: 0
7. FEELING AFRAID AS IF SOMETHING AWFUL MIGHT HAPPEN: 0

## 2021-08-20 ASSESSMENT — ENCOUNTER SYMPTOMS
COLOR CHANGE: 0
EYES NEGATIVE: 1
BACK PAIN: 1
RESPIRATORY NEGATIVE: 1
GASTROINTESTINAL NEGATIVE: 1

## 2021-08-20 ASSESSMENT — PATIENT HEALTH QUESTIONNAIRE - PHQ9
SUM OF ALL RESPONSES TO PHQ9 QUESTIONS 1 & 2: 0
2. FEELING DOWN, DEPRESSED OR HOPELESS: 0
SUM OF ALL RESPONSES TO PHQ QUESTIONS 1-9: 2
7. TROUBLE CONCENTRATING ON THINGS, SUCH AS READING THE NEWSPAPER OR WATCHING TELEVISION: 0
SUM OF ALL RESPONSES TO PHQ QUESTIONS 1-9: 2
4. FEELING TIRED OR HAVING LITTLE ENERGY: 1
8. MOVING OR SPEAKING SO SLOWLY THAT OTHER PEOPLE COULD HAVE NOTICED. OR THE OPPOSITE, BEING SO FIGETY OR RESTLESS THAT YOU HAVE BEEN MOVING AROUND A LOT MORE THAN USUAL: 0
6. FEELING BAD ABOUT YOURSELF - OR THAT YOU ARE A FAILURE OR HAVE LET YOURSELF OR YOUR FAMILY DOWN: 0
9. THOUGHTS THAT YOU WOULD BE BETTER OFF DEAD, OR OF HURTING YOURSELF: 0
10. IF YOU CHECKED OFF ANY PROBLEMS, HOW DIFFICULT HAVE THESE PROBLEMS MADE IT FOR YOU TO DO YOUR WORK, TAKE CARE OF THINGS AT HOME, OR GET ALONG WITH OTHER PEOPLE: 0
SUM OF ALL RESPONSES TO PHQ QUESTIONS 1-9: 2
5. POOR APPETITE OR OVEREATING: 0
1. LITTLE INTEREST OR PLEASURE IN DOING THINGS: 0
3. TROUBLE FALLING OR STAYING ASLEEP: 1

## 2021-08-20 NOTE — PROGRESS NOTES
Subjective:      Chief Complaint   Patient presents with    Follow-up     Patient presents today for a 3 month follow up. HPI:  Danae Haile is a 76 y.o. female who presents today for 3 month follow up. She is planning to have bilateral TKA and has an appointment on 21. Will do EKG and labs today in anticipation of upcoming surgery. Depression  She reports depression is well controlled with Effexor 150 mg daily. Patient denies AVH/SIB/SI/HI.        Hypertension  Patient is here for follow-up of elevated blood pressure. She is not exercising and is adherent to a low-salt diet. Blood pressure is well controlled at home - averages 120/70's. She denies chest pain, chest pressure/discomfort, claudication, dyspnea, exertional chest pressure/discomfort, fatigue, irregular heart beat, near-syncope, orthopnea, palpitations, paroxysmal nocturnal dyspnea, syncope or edema.        Past Medical History:   Diagnosis Date    [facility]    ovarian - contained    GERD (gastroesophageal reflux disease)     Hepatitis A     Hyperlipidemia     Hypertension     Hypothyroidism     Sjogren's disease ([facility]         Social History     Tobacco Use    Smoking status: Former Smoker     Packs/day: 1.50     Years: 15.00     Pack years: 22.50     Types: Cigarettes     Quit date: 10/1/1970     Years since quittin.9    Smokeless tobacco: Never Used   Substance Use Topics    Alcohol use: Yes     Comment: rarely        Review of Systems   Constitutional: Negative for activity change, appetite change, fatigue and unexpected weight change. HENT: Negative. Eyes: Negative. Respiratory: Negative. Cardiovascular: Negative. Gastrointestinal: Negative. Musculoskeletal: Positive for arthralgias, back pain (chronic lower back pain) and gait problem (using a cane for assistance). Negative for joint swelling. Skin: Negative for color change and rash.    Neurological: Positive for numbness (bilateral LEs). Negative for dizziness, tremors, seizures, syncope, facial asymmetry, speech difficulty, weakness, light-headedness and headaches. Psychiatric/Behavioral: Negative. Negative for agitation, decreased concentration, dysphoric mood, sleep disturbance and suicidal ideas. The patient is not nervous/anxious. Objective:      /81 (Site: Left Upper Arm, Position: Sitting, Cuff Size: Large Adult)   Pulse 76   Temp 97.4 °F (36.3 °C) (Temporal)   Resp 16   Wt 279 lb (126.6 kg)   SpO2 95%   BMI 40.90 kg/m²      Physical Exam  Vitals reviewed. Constitutional:       Appearance: Normal appearance. She is obese. HENT:      Head: Normocephalic. Right Ear: External ear normal.      Left Ear: External ear normal.      Nose: Nose normal.      Mouth/Throat:      Mouth: Mucous membranes are moist.      Pharynx: Oropharynx is clear. Eyes:      Conjunctiva/sclera: Conjunctivae normal.      Pupils: Pupils are equal, round, and reactive to light. Neck:      Vascular: No carotid bruit. Cardiovascular:      Rate and Rhythm: Normal rate and regular rhythm. Heart sounds: Normal heart sounds. Pulmonary:      Effort: Pulmonary effort is normal.      Breath sounds: Normal breath sounds. Abdominal:      General: Bowel sounds are normal. There is no distension. Palpations: Abdomen is soft. Tenderness: There is no abdominal tenderness. Musculoskeletal:      Cervical back: Normal range of motion and neck supple. Right lower leg: No edema. Left lower leg: No edema. Skin:     General: Skin is warm and dry. Findings: No bruising. Neurological:      General: No focal deficit present. Mental Status: She is alert and oriented to person, place, and time. Psychiatric:         Mood and Affect: Mood normal.         Behavior: Behavior normal.         Judgment: Judgment normal.            Assessment / Plan:      1.  Essential hypertension  BP well controlled, continue current medication. Will check labs and baseline.   - CBC Auto Differential  - Comprehensive Metabolic Panel, Fasting  - Lipid, Fasting  - EKG 12 lead    2. IFG (impaired fasting glucose)  Will check labs. - Hemoglobin A1C    3. Abnormal EKG  Will refer to cardiology for further evaluation of possible abnormal EKG. Pt is asymptomatic.   Patient instructed to go to ER for the following symptoms: syncope, confusion, severe headache, sudden changes in your vision, severe chest pain or pressure, chest pain that radiates to your jaw or in your arm,  shortness of breath, unilateral weakness or numbness, seizure activity.  - Gordo Renee MD, Cardiology, Bo Jefferson, APRN - CNP

## 2021-08-21 LAB
ESTIMATED AVERAGE GLUCOSE: 114 MG/DL
HBA1C MFR BLD: 5.6 %

## 2021-09-07 ENCOUNTER — TELEPHONE (OUTPATIENT)
Dept: CARDIOLOGY CLINIC | Age: 76
End: 2021-09-07

## 2021-09-07 NOTE — TELEPHONE ENCOUNTER
Cardiologist: Dr. Ted Rothman  Surgeon: Dr. Yary Mendoza  Surgery: Rt Total knee arthroplasty  Anesthesia: Spinal  Date: TBD  FAX# [phone]  Ph# [phone]    Nara campos patient.

## 2021-09-09 ENCOUNTER — HOSPITAL ENCOUNTER (OUTPATIENT)
Age: 76
Discharge: HOME | End: 2021-09-09
Payer: COMMERCIAL

## 2021-09-09 ENCOUNTER — INITIAL CONSULT (OUTPATIENT)
Dept: CARDIOLOGY CLINIC | Age: 76
End: 2021-09-09
Payer: COMMERCIAL

## 2021-09-09 VITALS
HEART RATE: 68 BPM | WEIGHT: 279 LBS | BODY MASS INDEX: 39.94 KG/M2 | HEIGHT: 70 IN | SYSTOLIC BLOOD PRESSURE: 138 MMHG | DIASTOLIC BLOOD PRESSURE: 88 MMHG

## 2021-09-09 DIAGNOSIS — E78.2 MIXED HYPERLIPIDEMIA: ICD-10-CM

## 2021-09-09 DIAGNOSIS — R06.02 SOB (SHORTNESS OF BREATH): ICD-10-CM

## 2021-09-09 DIAGNOSIS — R06.02 SHORTNESS OF BREATH: ICD-10-CM

## 2021-09-09 DIAGNOSIS — R94.31 ABNORMAL EKG: ICD-10-CM

## 2021-09-09 DIAGNOSIS — E03.9 ACQUIRED HYPOTHYROIDISM: ICD-10-CM

## 2021-09-09 DIAGNOSIS — F33.41 RECURRENT MAJOR DEPRESSIVE DISORDER, IN PARTIAL REMISSION (HCC): Primary | ICD-10-CM

## 2021-09-09 DIAGNOSIS — E66.01 OBESITY, CLASS III, BMI 40-49.9 (MORBID OBESITY) (HCC): ICD-10-CM

## 2021-09-09 DIAGNOSIS — I10 ESSENTIAL HYPERTENSION: ICD-10-CM

## 2021-09-09 LAB — PRO-BNP: 110.2 PG/ML

## 2021-09-09 PROCEDURE — 99204 OFFICE O/P NEW MOD 45 MIN: CPT | Performed by: INTERNAL MEDICINE

## 2021-09-09 PROCEDURE — 36415 COLL VENOUS BLD VENIPUNCTURE: CPT

## 2021-09-09 PROCEDURE — 83880 ASSAY OF NATRIURETIC PEPTIDE: CPT

## 2021-09-09 PROCEDURE — 93000 ELECTROCARDIOGRAM COMPLETE: CPT | Performed by: INTERNAL MEDICINE

## 2021-09-09 NOTE — PROGRESS NOTES
conversant  Constitutional:  Well developed, Well nourished, No acute distress, Non-toxic appearance. HENT:  Normocephalic, Atraumatic, Bilateral external ears normal, Oropharynx moist, No oral exudates, Nose normal. Neck- Normal range of motion, No tenderness, Supple, No stridor,no apical-carotid delay  Eyes:  PERRL, EOMI, Conjunctiva normal, No discharge. Respiratory:  Normal breath sounds, No respiratory distress, No wheezing, No chest tenderness. ,no use of accessory muscles, NO crackles  Cardiovascular: (PMI) apex non displaced,no lifts no thrills,S1 and S2 audible, No added heart sounds, No signs of ankle edema, or volume overload, No evidence of JVD, No crackles  GI:  Bowel sounds normal, Soft, No tenderness, No masses, No gross visceromegaly   :  No costovertebral angle tenderness   Musculoskeletal:  No edema, no tenderness, no deformities. Back- no tenderness  Integument:  Well hydrated, no rash   Lymphatic:  No lymphadenopathy noted   Neurologic:  Alert & oriented x 3, CN 2-12 normal, normal motor function, normal sensory function, no focal deficits noted   Psychiatric:  Speech and behavior appropriate       Medical decision making and Data review:  DATA:  No results found for: TROPONINT  BNP:    Lab Results   Component Value Date    PROBNP 110.2 09/09/2021     PT/INR:  No results found for: PTINR  Lab Results   Component Value Date    LABA1C 5.6 08/20/2021    LABA1C 6.1 10/20/2020     Lab Results   Component Value Date    CHOL 130 10/09/2019    TRIG 218 (H) 10/09/2019    HDL 48 08/20/2021    LDLCALC 39 08/20/2021    LDLDIRECT 57 07/12/2017     Lab Results   Component Value Date    ALT 19 08/20/2021    AST 25 08/20/2021     No results for input(s): WBC, HGB, HCT, MCV, PLT in the last 72 hours.   TSH:   Lab Results   Component Value Date    TSH 3.86 10/20/2020     Lab Results   Component Value Date    AST 25 08/20/2021    ALT 19 08/20/2021    BILIDIR 0.2 09/19/2018    BILITOT 0.4 08/20/2021    ALKPHOS 104 08/20/2021     Lab Results   Component Value Date    PROBNP 110.2 09/09/2021     Lab Results   Component Value Date    LABA1C 5.6 08/20/2021    LABA1C 6.1 10/20/2020     Lab Results   Component Value Date    WBC 5.6 08/20/2021    HGB 14.5 08/20/2021    HCT 42.5 08/20/2021     08/20/2021     All labs, medications and tests reviewed by myself including data and history from outside source , patient and available family . Assessment & Plan:      1. Recurrent major depressive disorder, in partial remission ([facility]    2. Shortness of breath    3. Essential hypertension    4. Acquired hypothyroidism    5. Mixed hyperlipidemia    6. Abnormal EKG    7. SOB (shortness of breath)    8. Obesity, Class III, BMI 40-49.9 (morbid obesity) (HCC)         Abnormal EKG  Small QRS complex and septal Q waves will get echo to rule out pericardial effusion and wall motion abnormality    SOB (shortness of breath)  Reports shortness of breath with exertion check BNP also get echo, get stress test Lexiscan to rule out ischemia as etiology she is planned for bilateral knee replacement surgery soon. Hypertension   Advised to check blood pressure at home currently blood pressures well controlled     Dyslipidemia :  All available lab work was reviewed. Patient was advised to repeat lab work before next visit. Necessary orders were placed , instructions given by myself       Counseled extensively and medication compliance urged. We discussed that for the  prevention of ASCVD our  goal is aggressive risk modification. Patient is encouraged to exercise if they can , educated about  brisk walk for 30 minutes  at least 3 to 4 times a week if there are no physical limitations  Various goals were discussed and questions answered. Continue current medications. Appropriate prescriptions are addressed and refills ordered. Questions answered and patient verbalizes understanding. Call for any problems, questions, or concerns. Greater than 60 % of time spent counseling besides reviewing data and images     Continue all other medications of all above medical condition listed as is. Return in about 1 month (around 10/9/2021). Please note this report has been partially produced using speech recognition software and may contain errors related to that system including errors in grammar, punctuation, and spelling, as well as words and phrases that may be inappropriate. If there are any questions or concerns please feel free to contact the dictating provider for clarification.

## 2021-09-09 NOTE — ASSESSMENT & PLAN NOTE
Small QRS complex and septal Q waves will get echo to rule out pericardial effusion and wall motion abnormality

## 2021-09-09 NOTE — ASSESSMENT & PLAN NOTE
Reports shortness of breath with exertion check BNP also get echo, get stress test Lexiscan to rule out ischemia as etiology she is planned for bilateral knee replacement surgery soon.

## 2021-09-09 NOTE — ASSESSMENT & PLAN NOTE
Advised to check blood pressure at home currently blood pressures well controlled Attempted to reach patient via phone, unsucessful. Left message to return call.

## 2021-09-16 ENCOUNTER — PROCEDURE VISIT (OUTPATIENT)
Dept: CARDIOLOGY CLINIC | Age: 76
End: 2021-09-16
Payer: COMMERCIAL

## 2021-09-16 DIAGNOSIS — R06.02 SHORTNESS OF BREATH: ICD-10-CM

## 2021-09-16 DIAGNOSIS — F33.41 RECURRENT MAJOR DEPRESSIVE DISORDER, IN PARTIAL REMISSION (HCC): ICD-10-CM

## 2021-09-16 DIAGNOSIS — R94.31 ABNORMAL EKG: ICD-10-CM

## 2021-09-16 DIAGNOSIS — I10 ESSENTIAL HYPERTENSION: ICD-10-CM

## 2021-09-16 DIAGNOSIS — E03.9 ACQUIRED HYPOTHYROIDISM: ICD-10-CM

## 2021-09-16 DIAGNOSIS — R06.02 SOB (SHORTNESS OF BREATH): ICD-10-CM

## 2021-09-16 LAB
LV EF: 58 %
LV EF: 63 %
LVEF MODALITY: NORMAL
LVEF MODALITY: NORMAL

## 2021-09-16 PROCEDURE — 93017 CV STRESS TEST TRACING ONLY: CPT | Performed by: INTERNAL MEDICINE

## 2021-09-16 PROCEDURE — 78452 HT MUSCLE IMAGE SPECT MULT: CPT | Performed by: INTERNAL MEDICINE

## 2021-09-16 PROCEDURE — 93016 CV STRESS TEST SUPVJ ONLY: CPT | Performed by: INTERNAL MEDICINE

## 2021-09-16 PROCEDURE — 93018 CV STRESS TEST I&R ONLY: CPT | Performed by: INTERNAL MEDICINE

## 2021-09-16 PROCEDURE — A9500 TC99M SESTAMIBI: HCPCS | Performed by: INTERNAL MEDICINE

## 2021-09-16 PROCEDURE — 93306 TTE W/DOPPLER COMPLETE: CPT | Performed by: INTERNAL MEDICINE

## 2021-09-17 ENCOUNTER — TELEPHONE (OUTPATIENT)
Dept: CARDIOLOGY CLINIC | Age: 76
End: 2021-09-17

## 2021-09-17 NOTE — TELEPHONE ENCOUNTER
Cardiologist: Dr. Fransico Chun  Surgeon: Dr. Jane Newman   Surgery: Right Total Knee Arhtoplasty   Anesthesia: Spinal  Date: TBD  FAX# [phone]    Ph# [phone]    Last OV 9/9/2021 w/Dr Fransico Chun

## 2021-09-17 NOTE — LETTER
Cardiac Risk Assessment       9/20/2021    Surgery Date: TBD       Surgery: Right Total Knee Arthroplasty       Anesthesia Type: Spinal       Fax Number: [phone]   To: Dr. Andrew Calvert  From : Dr. Nila Boyer    Patient Name: Olayinka Mccollum     YOB: 1945     Will Asencio was evaluated from a cardiac standpoint for Right Total Knee Arthorplasty surgery and based on cardiac history, diagnosis, recent cardiac testing, Patient is considered to be low risk candidate for any ethan-operative cardiac complications. Please continue patient's current medical regimen. Please call with any further questions.     Respectfully,         Dr. Giovany Palomino MD

## 2021-10-07 ENCOUNTER — OFFICE VISIT (OUTPATIENT)
Dept: CARDIOLOGY CLINIC | Age: 76
End: 2021-10-07
Payer: COMMERCIAL

## 2021-10-07 VITALS
HEIGHT: 70 IN | HEART RATE: 86 BPM | WEIGHT: 279 LBS | DIASTOLIC BLOOD PRESSURE: 82 MMHG | SYSTOLIC BLOOD PRESSURE: 134 MMHG | BODY MASS INDEX: 39.94 KG/M2

## 2021-10-07 DIAGNOSIS — I10 PRIMARY HYPERTENSION: Primary | ICD-10-CM

## 2021-10-07 DIAGNOSIS — R94.31 ABNORMAL EKG: ICD-10-CM

## 2021-10-07 DIAGNOSIS — R06.02 SOB (SHORTNESS OF BREATH): ICD-10-CM

## 2021-10-07 DIAGNOSIS — E78.2 MIXED HYPERLIPIDEMIA: ICD-10-CM

## 2021-10-07 PROCEDURE — 99214 OFFICE O/P EST MOD 30 MIN: CPT | Performed by: INTERNAL MEDICINE

## 2021-10-07 RX ORDER — TRIAMTERENE AND HYDROCHLOROTHIAZIDE 37.5; 25 MG/1; MG/1
0.5 TABLET ORAL DAILY
Qty: 90 TABLET | Refills: 1 | Status: SHIPPED | OUTPATIENT
Start: 2021-10-07 | End: 2022-06-13

## 2021-10-07 RX ORDER — BACLOFEN 10 MG/1
TABLET ORAL
COMMUNITY
Start: 2021-09-01 | End: 2022-01-10

## 2021-10-07 RX ORDER — TRAMADOL HYDROCHLORIDE 50 MG/1
TABLET ORAL
COMMUNITY
Start: 2021-09-14

## 2021-10-07 NOTE — PROGRESS NOTES
CARDIOLOGY  NOTE    Chief Complaint: Abnormal EKG    HPI:   Adrian Wilde is a 76y.o. year old who has Past medical history as noted below. Retired cardiology nurse , had ekg showing septal q wavs and small QRS complexes , she is planned for knee surgery . She says her BP runs above 135  Range, echos shows mild to moderate aortic and mitral valve regurg  She has SOB on exertion but no ankle swelling , she has Hypertension , she retired 15 years ago  She denies any chest pain or arm pain , no family history of cardiac issues   She has h/o HEp A and had abnormal LFT for along time and was diagnosed of autoimmune condition? Current Outpatient Medications   Medication Sig Dispense Refill    traMADol (ULTRAM) 50 MG tablet TABLET BY MOUTH EVERY 4 TO 6 HOURS AS NEEDED FOR PAIN      baclofen (LIORESAL) 10 MG tablet TAKE 1 TABLET BY MOUTH NIGHTLY AS NEEDED (BACK SPASM)      triamterene-hydroCHLOROthiazide (MAXZIDE-25) 37.5-25 MG per tablet Take 0.5 tablets by mouth daily 90 tablet 1    carvedilol (COREG) 12.5 MG tablet Take 1 tablet by mouth 2 times daily 180 tablet 2    simvastatin (ZOCOR) 20 MG tablet TAKE 1 TABLET BY MOUTH EVERY DAY AT NIGHT 90 tablet 2    levothyroxine (SYNTHROID) 125 MCG tablet TAKE 1 TABLET BY MOUTH EVERY DAY 90 tablet 3    Cholecalciferol (VITAMIN D3) 2000 units CAPS Take 2,000 Units by mouth every morning       Flaxseed, Linseed, (FLAXSEED OIL) 1200 MG CAPS Take 1,200 mg by mouth every morning       famotidine (PEPCID) 20 MG tablet Take 20 mg by mouth nightly       therapeutic multivitamin-minerals (THERAGRAN-M) tablet Take 1 tablet by mouth every morning       Ascorbic Acid (VITAMIN C) 500 MG tablet Take 1,000 mg by mouth every morning       venlafaxine (EFFEXOR XR) 150 MG extended release capsule Take 1 capsule by mouth daily 90 capsule 2     No current facility-administered medications for this visit.        Allergies:   Ace inhibitors and Nsaids    Patient History:  Past Medical History:   Diagnosis Date    Cancer ([facility] [address]     ovarian - contained    GERD (gastroesophageal reflux disease)     H/O cardiovascular stress test 2021    Normal EF 63 % with normal ventricular contractility. No infarct or ischemia noted    H/O echocardiogram 2021    EF is estimated at 55-60%.  Hepatitis A     Hyperlipidemia     Hypertension     Hypothyroidism     Sjogren's disease ([facility] [address]      Past Surgical History:   Procedure Laterality Date    ACHILLES TENDON SURGERY Left 2005    ANKLE SURGERY      reconstruction    COLONOSCOPY  , , 10/23/2017, 2018    polyp (2018), polyp (); normal (95)    HYSTERECTOMY, TOTAL ABDOMINAL      THYROIDECTOMY, PARTIAL       Family History   Problem Relation Age of Onset   [place] Breast Cancer Mother     Colon Cancer Mother     Glaucoma Mother     Kidney Cancer Father     Kidney Disease Father     Glaucoma Maternal Grandmother      Social History     Tobacco Use    Smoking status: Former Smoker     Packs/day: 1.50     Years: 15.00     Pack years: 22.50     Types: Cigarettes     Quit date: 10/1/1970     Years since quittin.0    Smokeless tobacco: Never Used   Substance Use Topics    Alcohol use: Yes     Comment: rarely        Review of Systems:   · Constitutional: No Fever or Weight Loss   · Eyes: No Decreased Vision  · ENT: No Headaches, Hearing Loss or Vertigo  · Cardiovascular: as per note above   · Respiratory: No cough or wheezing and as per note above.    · Gastrointestinal: No abdominal pain, appetite loss, blood in stools, constipation, diarrhea or heartburn  · Genitourinary: No dysuria, trouble voiding, or hematuria  · Musculoskeletal:  denies any new  joint aches , swelling  or pain   · Integumentary: No rash or pruritis  · Neurological: No TIA or stroke symptoms  · Psychiatric: No anxiety or depression  · Endocrine: No malaise, fatigue or temperature intolerance  · Hematologic/Lymphatic: No bleeding problems, blood clots or swollen lymph nodes  · Allergic/Immunologic: No nasal congestion or hives    Objective:      Physical Exam:  /82   Pulse 86   Ht 5' 10\" (1.778 m)   Wt 279 lb (126.6 kg)   BMI 40.03 kg/m²   Wt Readings from Last 3 Encounters:   10/07/21 279 lb (126.6 kg)   09/09/21 279 lb (126.6 kg)   08/20/21 279 lb (126.6 kg)     Body mass index is 40.03 kg/m². Vitals:    10/07/21 1355   BP: 134/82   Pulse: 86        General Appearance:  No distress, conversant  Constitutional:  Well developed, Well nourished, No acute distress, Non-toxic appearance. HENT:  Normocephalic, Atraumatic, Bilateral external ears normal, Oropharynx moist, No oral exudates, Nose normal. Neck- Normal range of motion, No tenderness, Supple, No stridor,no apical-carotid delay  Eyes:  PERRL, EOMI, Conjunctiva normal, No discharge. Respiratory:  Normal breath sounds, No respiratory distress, No wheezing, No chest tenderness. ,no use of accessory muscles, NO crackles  Cardiovascular: (PMI) apex non displaced,no lifts no thrills,S1 and S2 audible, No added heart sounds, No signs of ankle edema, or volume overload, No evidence of JVD, No crackles  GI:  Bowel sounds normal, Soft, No tenderness, No masses, No gross visceromegaly   :  No costovertebral angle tenderness   Musculoskeletal:  No edema, no tenderness, no deformities.  Back- no tenderness  Integument:  Well hydrated, no rash   Lymphatic:  No lymphadenopathy noted   Neurologic:  Alert & oriented x 3, CN 2-12 normal, normal motor function, normal sensory function, no focal deficits noted   Psychiatric:  Speech and behavior appropriate       Medical decision making and Data review:  DATA:  No results found for: TROPONINT  BNP:    Lab Results   Component Value Date    PROBNP 110.2 09/09/2021     PT/INR:  No results found for: [place]  Lab Results   Component Value Date    LABA1C 5.6 08/20/2021    LABA1C 6.1 10/20/2020 Lab Results   Component Value Date    CHOL 130 10/09/2019    TRIG 218 (H) 10/09/2019    HDL 48 08/20/2021    LDLCALC 39 08/20/2021    LDLDIRECT 57 07/12/2017     Lab Results   Component Value Date    ALT 19 08/20/2021    AST 25 08/20/2021     No results for input(s): WBC, HGB, HCT, MCV, PLT in the last 72 hours. TSH:   Lab Results   Component Value Date    TSH 3.86 10/20/2020     Lab Results   Component Value Date    AST 25 08/20/2021    ALT 19 08/20/2021    BILIDIR 0.2 09/19/2018    BILITOT 0.4 08/20/2021    ALKPHOS 104 08/20/2021     Lab Results   Component Value Date    PROBNP 110.2 09/09/2021     Lab Results   Component Value Date    LABA1C 5.6 08/20/2021    LABA1C 6.1 10/20/2020     Lab Results   Component Value Date    WBC 5.6 08/20/2021    HGB 14.5 08/20/2021    HCT 42.5 08/20/2021     08/20/2021     Echo 9/16/21  Summary   Left ventricular function and size is normal, EF is estimated at 55-60%. Moderate left ventricular hypertrophy. Grade I diastolic dysfunction. No regional wall motion abnormalities were detected. Moderately dilated left atrium. Sclerotic, but non-stenotic aortic valve. Mitral annular calcification is present. Mild mitral. tricuspid and moderate aortic regurgitation is present. RVSP is 17 mmHg. No evidence of pericardial effusion. Stress test  9/16/21  Summary    Supervising physician Dr. Lyndsay Gardiner .    Normal EF 63 % with normal ventricular contractility. No infarct or ischemia    noted.    Normal stress myocardial perfusion.    This is a normal study. All labs, medications and tests reviewed by myself including data and history from outside source , patient and available family . Assessment & Plan:      1. Primary hypertension    2. Mixed hyperlipidemia    3. Abnormal EKG    4.  SOB (shortness of breath)         Preop  Low risk proceed with surgery     Abnormal EKG  Small QRS complex and septal Q waves , echo shows no effusion , body habitus/    Multivalvular disease  Moderate AI and Mild MR , continue to monitor , needs good BP control     SOB (shortness of breath)  Multifactorial  Reports shortness of breath with exertion , Bnp is normal     Hypertension   Advised to check blood pressure at home currently blood pressures well controlled. Start maxzide      Dyslipidemia :  All available lab work was reviewed. Patient was advised to repeat lab work before next visit. Necessary orders were placed , instructions given by myself       Counseled extensively and medication compliance urged. We discussed that for the  prevention of ASCVD our  goal is aggressive risk modification. Patient is encouraged to exercise if they can , educated about  brisk walk for 30 minutes  at least 3 to 4 times a week if there are no physical limitations  Various goals were discussed and questions answered. Continue current medications. Appropriate prescriptions are addressed and refills ordered. Questions answered and patient verbalizes understanding. Call for any problems, questions, or concerns. Greater than 60 % of time spent counseling besides reviewing data and images     Continue all other medications of all above medical condition listed as is. Return in about 6 months (around 4/7/2022). Please note this report has been partially produced using speech recognition software and may contain errors related to that system including errors in grammar, punctuation, and spelling, as well as words and phrases that may be inappropriate. If there are any questions or concerns please feel free to contact the dictating provider for clarification.

## 2021-10-11 ENCOUNTER — HOSPITAL ENCOUNTER (OUTPATIENT)
Age: 76
Setting detail: SPECIMEN
Discharge: HOME | End: 2021-10-11

## 2021-10-11 LAB
SARS-COV-2, NAAT: DETECTED
SOURCE: ABNORMAL

## 2021-10-11 PROCEDURE — 87635 SARS-COV-2 COVID-19 AMP PRB: CPT

## 2021-10-12 ENCOUNTER — HOSPITAL ENCOUNTER (OUTPATIENT)
Age: 76
Setting detail: SPECIMEN
Discharge: HOME | End: 2021-10-12
Payer: COMMERCIAL

## 2021-10-12 LAB
SARS-COV-2, NAAT: DETECTED
SOURCE: ABNORMAL

## 2021-10-12 PROCEDURE — 87635 SARS-COV-2 COVID-19 AMP PRB: CPT

## 2021-10-13 ENCOUNTER — TELEPHONE (OUTPATIENT)
Dept: FAMILY MEDICINE CLINIC | Age: 76
End: 2021-10-13

## 2021-10-13 NOTE — TELEPHONE ENCOUNTER
Physician Assistant from patients surgery called to let us know that patient tested positive Monday night. He would like for us just to call and follow up with patient and schedule her for a follow up with provider after her quarantine is over. I will call and check on patient and schedule her with a provider in [facility].

## 2021-10-15 DIAGNOSIS — E03.9 ACQUIRED HYPOTHYROIDISM: ICD-10-CM

## 2021-10-18 RX ORDER — LEVOTHYROXINE SODIUM 0.12 MG/1
TABLET ORAL
Qty: 90 TABLET | Refills: 3 | Status: SHIPPED | OUTPATIENT
Start: 2021-10-18 | End: 2022-10-18 | Stop reason: SDUPTHER

## 2021-10-26 NOTE — TELEPHONE ENCOUNTER
Called and spoke with patient. She is feeling better and does not need an appointment at this time.  Follow up with surgeon today

## 2022-01-05 DIAGNOSIS — M54.50 LOW BACK PAIN: ICD-10-CM

## 2022-01-05 DIAGNOSIS — F33.41 RECURRENT MAJOR DEPRESSIVE DISORDER, IN PARTIAL REMISSION (HCC): ICD-10-CM

## 2022-01-10 RX ORDER — BACLOFEN 10 MG/1
TABLET ORAL
Qty: 90 TABLET | Refills: 2 | Status: SHIPPED | OUTPATIENT
Start: 2022-01-10

## 2022-01-10 RX ORDER — VENLAFAXINE HYDROCHLORIDE 150 MG/1
CAPSULE, EXTENDED RELEASE ORAL
Qty: 90 CAPSULE | Refills: 2 | Status: SHIPPED | OUTPATIENT
Start: 2022-01-10

## 2022-03-03 DIAGNOSIS — E78.2 MIXED HYPERLIPIDEMIA: ICD-10-CM

## 2022-03-03 DIAGNOSIS — I10 ESSENTIAL HYPERTENSION: ICD-10-CM

## 2022-03-03 RX ORDER — CARVEDILOL 12.5 MG/1
TABLET ORAL
Qty: 180 TABLET | Refills: 2 | Status: SHIPPED | OUTPATIENT
Start: 2022-03-03

## 2022-03-03 RX ORDER — SIMVASTATIN 20 MG
TABLET ORAL
Qty: 90 TABLET | Refills: 2 | Status: SHIPPED | OUTPATIENT
Start: 2022-03-03

## 2022-06-06 SDOH — HEALTH STABILITY: PHYSICAL HEALTH: ON AVERAGE, HOW MANY MINUTES DO YOU ENGAGE IN EXERCISE AT THIS LEVEL?: 30 MIN

## 2022-06-06 SDOH — HEALTH STABILITY: PHYSICAL HEALTH: ON AVERAGE, HOW MANY DAYS PER WEEK DO YOU ENGAGE IN MODERATE TO STRENUOUS EXERCISE (LIKE A BRISK WALK)?: 3 DAYS

## 2022-06-06 ASSESSMENT — PATIENT HEALTH QUESTIONNAIRE - PHQ9
SUM OF ALL RESPONSES TO PHQ9 QUESTIONS 1 & 2: 0
SUM OF ALL RESPONSES TO PHQ QUESTIONS 1-9: 0
1. LITTLE INTEREST OR PLEASURE IN DOING THINGS: 0
SUM OF ALL RESPONSES TO PHQ QUESTIONS 1-9: 0
2. FEELING DOWN, DEPRESSED OR HOPELESS: 0
SUM OF ALL RESPONSES TO PHQ QUESTIONS 1-9: 0
SUM OF ALL RESPONSES TO PHQ QUESTIONS 1-9: 0

## 2022-06-06 ASSESSMENT — LIFESTYLE VARIABLES
HOW MANY STANDARD DRINKS CONTAINING ALCOHOL DO YOU HAVE ON A TYPICAL DAY: 98
HOW OFTEN DO YOU HAVE A DRINK CONTAINING ALCOHOL: NEVER
HOW OFTEN DO YOU HAVE SIX OR MORE DRINKS ON ONE OCCASION: 1
HOW MANY STANDARD DRINKS CONTAINING ALCOHOL DO YOU HAVE ON A TYPICAL DAY: PATIENT DECLINED
HOW OFTEN DO YOU HAVE A DRINK CONTAINING ALCOHOL: 1

## 2022-06-13 ENCOUNTER — OFFICE VISIT (OUTPATIENT)
Dept: FAMILY MEDICINE CLINIC | Age: 77
End: 2022-06-13
Payer: COMMERCIAL

## 2022-06-13 ENCOUNTER — TELEPHONE (OUTPATIENT)
Dept: CARDIOLOGY CLINIC | Age: 77
End: 2022-06-13

## 2022-06-13 VITALS
SYSTOLIC BLOOD PRESSURE: 140 MMHG | BODY MASS INDEX: 37.45 KG/M2 | OXYGEN SATURATION: 97 % | RESPIRATION RATE: 18 BRPM | DIASTOLIC BLOOD PRESSURE: 81 MMHG | HEART RATE: 73 BPM | WEIGHT: 261 LBS | TEMPERATURE: 97.2 F

## 2022-06-13 DIAGNOSIS — E03.9 ACQUIRED HYPOTHYROIDISM: ICD-10-CM

## 2022-06-13 DIAGNOSIS — Z00.00 MEDICARE ANNUAL WELLNESS VISIT, SUBSEQUENT: Primary | ICD-10-CM

## 2022-06-13 DIAGNOSIS — Z78.0 POSTMENOPAUSAL: ICD-10-CM

## 2022-06-13 DIAGNOSIS — R73.01 IMPAIRED FASTING GLUCOSE: ICD-10-CM

## 2022-06-13 DIAGNOSIS — I10 PRIMARY HYPERTENSION: ICD-10-CM

## 2022-06-13 LAB
BASOPHILS ABSOLUTE: 0 K/UL (ref 0–0.2)
BASOPHILS RELATIVE PERCENT: 0.2 %
EOSINOPHILS ABSOLUTE: 0 K/UL (ref 0–0.6)
EOSINOPHILS RELATIVE PERCENT: 0.1 %
HCT VFR BLD CALC: 43.3 % (ref 36–48)
HEMOGLOBIN: 14.8 G/DL (ref 12–16)
LYMPHOCYTES ABSOLUTE: 1.4 K/UL (ref 1–5.1)
LYMPHOCYTES RELATIVE PERCENT: 20.7 %
MCH RBC QN AUTO: 32.6 PG (ref 26–34)
MCHC RBC AUTO-ENTMCNC: 34.1 G/DL (ref 31–36)
MCV RBC AUTO: 95.6 FL (ref 80–100)
MONOCYTES ABSOLUTE: 0.5 K/UL (ref 0–1.3)
MONOCYTES RELATIVE PERCENT: 7.2 %
NEUTROPHILS ABSOLUTE: 4.7 K/UL (ref 1.7–7.7)
NEUTROPHILS RELATIVE PERCENT: 71.8 %
PDW BLD-RTO: 13.1 % (ref 12.4–15.4)
PLATELET # BLD: 280 K/UL (ref 135–450)
PMV BLD AUTO: 8.1 FL (ref 5–10.5)
RBC # BLD: 4.53 M/UL (ref 4–5.2)
WBC # BLD: 6.5 K/UL (ref 4–11)

## 2022-06-13 PROCEDURE — G0439 PPPS, SUBSEQ VISIT: HCPCS | Performed by: NURSE PRACTITIONER

## 2022-06-13 PROCEDURE — 1123F ACP DISCUSS/DSCN MKR DOCD: CPT | Performed by: NURSE PRACTITIONER

## 2022-06-13 PROCEDURE — 99214 OFFICE O/P EST MOD 30 MIN: CPT | Performed by: NURSE PRACTITIONER

## 2022-06-13 PROCEDURE — 36415 COLL VENOUS BLD VENIPUNCTURE: CPT | Performed by: NURSE PRACTITIONER

## 2022-06-13 RX ORDER — ACETAMINOPHEN 500 MG
500 TABLET ORAL EVERY 6 HOURS PRN
COMMUNITY

## 2022-06-13 ASSESSMENT — PATIENT HEALTH QUESTIONNAIRE - PHQ9
3. TROUBLE FALLING OR STAYING ASLEEP: 0
6. FEELING BAD ABOUT YOURSELF - OR THAT YOU ARE A FAILURE OR HAVE LET YOURSELF OR YOUR FAMILY DOWN: 0
4. FEELING TIRED OR HAVING LITTLE ENERGY: 1
2. FEELING DOWN, DEPRESSED OR HOPELESS: 0
5. POOR APPETITE OR OVEREATING: 1
9. THOUGHTS THAT YOU WOULD BE BETTER OFF DEAD, OR OF HURTING YOURSELF: 0
8. MOVING OR SPEAKING SO SLOWLY THAT OTHER PEOPLE COULD HAVE NOTICED. OR THE OPPOSITE, BEING SO FIGETY OR RESTLESS THAT YOU HAVE BEEN MOVING AROUND A LOT MORE THAN USUAL: 0
1. LITTLE INTEREST OR PLEASURE IN DOING THINGS: 0
SUM OF ALL RESPONSES TO PHQ QUESTIONS 1-9: 2
SUM OF ALL RESPONSES TO PHQ9 QUESTIONS 1 & 2: 0
10. IF YOU CHECKED OFF ANY PROBLEMS, HOW DIFFICULT HAVE THESE PROBLEMS MADE IT FOR YOU TO DO YOUR WORK, TAKE CARE OF THINGS AT HOME, OR GET ALONG WITH OTHER PEOPLE: 0
7. TROUBLE CONCENTRATING ON THINGS, SUCH AS READING THE NEWSPAPER OR WATCHING TELEVISION: 0

## 2022-06-13 NOTE — PROGRESS NOTES
Medicare Annual Wellness Visit    Gonzalez Felipe is here for Medicare AWV and Other (C/o heat intolerance. Also c/o bilateral thumb pain)    Assessment & Plan   Medicare annual wellness visit, subsequent    BMI 37.0-37.9, adult  Continue efforts at regular exercise, healthy diet and weight loss. Impaired fasting glucose  -     Comprehensive Metabolic Panel, Fasting; Future  -     Lipid, Fasting; Future  -     Hemoglobin A1C; Future    Postmenopausal  -     DEXA BONE DENSITY AXIAL SKELETON; Future    Primary hypertension  BP well controlled at home, continue current medication. Monitor blood pressures. Goal is 130/80 or less. Bring your blood pressure recordings to your next appointment, or you can send them to us. Bring your home blood pressure machine with you to your next appointment. Exercise moderately, 30-45 minutes most days of the week. Lose weight if overweight. Increase your daily intake of grains, fresh fruits and vegetables. Reduce dietary sodium; less than 2.4 grams per day. If you smoke stop smoking. Limit alcohol intake. Reduce stress level  -     CBC with Auto Differential; Future  -     Comprehensive Metabolic Panel, Fasting; Future  -     Lipid, Fasting; Future    Acquired hypothyroidism  Will recheck labs. -     T4, Free; Future  -     TSH; Future      Recommendations for Preventive Services Due: see orders and patient instructions/AVS.  Recommended screening schedule for the next 5-10 years is provided to the patient in written form: see Patient Instructions/AVS.     Return for Medicare Annual Wellness Visit in 1 year. Subjective   The following acute and/or chronic problems were also addressed today:    Depression  She reports depression is well controlled with Effexor 150 mg daily. Patient denies AVH/SIB/SI/HI.        Hypertension  Patient is here for follow-up of elevated blood pressure. She is not exercising and is adherent to a low-salt diet.  Blood pressure is well controlled at home - averages 120/70's. She denies chest pain, chest pressure/discomfort, claudication, dyspnea, exertional chest pressure/discomfort, fatigue, irregular heart beat, near-syncope, orthopnea, palpitations, paroxysmal nocturnal dyspnea, syncope or edema.  She was prescribed Maxzide by cardiology but never started prescription. Hypothyroidism  Recent symptoms: heat intolerance. Patient is  taking her medication consistently on an empty stomach. Patient's complete Health Risk Assessment and screening values have been reviewed and are found in Flowsheets. The following problems were reviewed today and where indicated follow up appointments were made and/or referrals ordered.     Positive Risk Factor Screenings with Interventions:    Fall Risk:  Do you feel unsteady or are you worried about falling? : (!) yes  2 or more falls in past year?: no  Fall with injury in past year?: no     Fall Risk Interventions:    · Patient declines any further evaluation/treatment for this issue  · upcoming left TKA; pt is waiting to be contacted for surgery date              General Health and ACP:  General  In general, how would you say your health is?: Very Good  In the past 7 days, have you experienced any of the following: New or Increased Pain, New or Increased Fatigue, Loneliness, Social Isolation, Stress or Anger?: No  Do you get the social and emotional support that you need?: Yes  Do you have a Living Will?: Yes    Advance Directives     Power of  Living Will ACP-Advance Directive ACP-Power of     Not on File Not on File Not on File Not on File      General Health Risk Interventions:  · No concerns noted    Health Habits/Nutrition:     Physical Activity: Insufficiently Active    Days of Exercise per Week: 3 days    Minutes of Exercise per Session: 30 min     Have you lost any weight without trying in the past 3 months?: No     Have you seen the dentist within the past year?: Appointment is scheduled    Health Habits/Nutrition Interventions:  · Inadequate physical activity:  patient is not ready to increase his/her physical activity level at this time  · Nutritional issues:  educational materials for healthy, well-balanced diet provided             Objective   Vitals:    06/13/22 1312   BP: (!) 140/81   Site: Left Upper Arm   Position: Sitting   Cuff Size: Large Adult   Pulse: 73   Resp: 18   Temp: 97.2 °F (36.2 °C)   SpO2: 97%   Weight: 261 lb (118.4 kg)      Body mass index is 37.45 kg/m². Physical Exam  Vitals reviewed. Constitutional:       Appearance: Normal appearance. She is obese. HENT:      Head: Normocephalic. Right Ear: External ear normal.      Left Ear: External ear normal.      Nose: Nose normal.      Mouth/Throat:      Mouth: Mucous membranes are moist.      Pharynx: Oropharynx is clear. Eyes:      Conjunctiva/sclera: Conjunctivae normal.      Pupils: Pupils are equal, round, and reactive to light. Neck:      Vascular: No carotid bruit. Cardiovascular:      Rate and Rhythm: Normal rate and regular rhythm. Heart sounds: Normal heart sounds. Pulmonary:      Effort: Pulmonary effort is normal.      Breath sounds: Normal breath sounds. Abdominal:      General: Bowel sounds are normal. There is no distension. Palpations: Abdomen is soft. Tenderness: There is no abdominal tenderness. Musculoskeletal:      Cervical back: Normal range of motion and neck supple. Right knee: Normal.      Left knee: Swelling present. No deformity or erythema. Decreased range of motion. Right lower leg: No edema. Left lower leg: No edema. Skin:     General: Skin is warm and dry. Findings: No bruising. Neurological:      General: No focal deficit present. Mental Status: She is alert and oriented to person, place, and time.    Psychiatric:         Mood and Affect: Mood normal.         Behavior: Behavior normal.         Judgment: Judgment normal. Allergies   Allergen Reactions    Ace Inhibitors      Angioedema    Nsaids Shortness Of Breath     Wheeze     Prior to Visit Medications    Medication Sig Taking?  Authorizing Provider   acetaminophen (TYLENOL) 500 MG tablet Take 500 mg by mouth every 6 hours as needed for Pain Taking BID Yes Historical Provider, MD   carvedilol (COREG) 12.5 MG tablet TAKE 1 TABLET BY MOUTH TWICE A DAY Yes SUJEY Méndez NP   simvastatin (ZOCOR) 20 MG tablet TAKE 1 TABLET BY MOUTH EVERY DAY AT NIGHT Yes SUJEY Summers NP   venlafaxine (EFFEXOR XR) 150 MG extended release capsule TAKE 1 CAPSULE BY MOUTH EVERY DAY Yes SUJEY Méndez NP   baclofen (LIORESAL) 10 MG tablet TAKE 1 TABLET BY MOUTH NIGHTLY AS NEEDED (BACK SPASM) Yes SUJEY Méndez NP   levothyroxine (SYNTHROID) 125 MCG tablet TAKE 1 TABLET BY MOUTH EVERY DAY Yes [facility]   traMADol (ULTRAM) 50 MG tablet TABLET BY MOUTH EVERY 4 TO 6 HOURS AS NEEDED FOR PAIN Yes Historical Provider, MD   Cholecalciferol (VITAMIN D3) 2000 units CAPS Take 2,000 Units by mouth every morning  Yes Historical Provider, MD   Flaxseed, Linseed, (FLAXSEED OIL) 1200 MG CAPS Take 1,200 mg by mouth every morning  Yes Historical Provider, MD   famotidine (PEPCID) 20 MG tablet Take 20 mg by mouth nightly  Yes Historical Provider, MD   therapeutic multivitamin-minerals (THERAGRAN-M) tablet Take 1 tablet by mouth every morning  Yes Historical Provider, MD   Ascorbic Acid (VITAMIN C) 500 MG tablet Take 1,000 mg by mouth every morning  Yes Historical Provider, MD [place] (Including outside providers/suppliers regularly involved in providing care):   Patient Care Team:  SUJEY Summers NP as PCP - General (Nurse Practitioner)  SUJEY Summers [facility]  Kathleen Donaldson MD as Consulting Physician (Internal Medicine)     Reviewed and updated this visit:  Tobacco  Allergies  Meds  Med Hx  Surg Hx  Soc Hx  Fam Hx

## 2022-06-13 NOTE — PATIENT INSTRUCTIONS
Personalized Preventive Plan for Davy Mccoy - 6/13/2022  Medicare offers a range of preventive health benefits. Some of the tests and screenings are paid in full while other may be subject to a deductible, co-insurance, and/or copay. Some of these benefits include a comprehensive review of your medical history including lifestyle, illnesses that may run in your family, and various assessments and screenings as appropriate. After reviewing your medical record and screening and assessments performed today your provider may have ordered immunizations, labs, imaging, and/or referrals for you. A list of these orders (if applicable) as well as your Preventive Care list are included within your After Visit Summary for your review. Other Preventive Recommendations:    · A preventive eye exam performed by an eye specialist is recommended every 1-2 years to screen for glaucoma; cataracts, macular degeneration, and other eye disorders. · A preventive dental visit is recommended every 6 months. · Try to get at least 150 minutes of exercise per week or 10,000 steps per day on a pedometer . · Order or download the FREE \"Exercise & Physical Activity: Your Everyday Guide\" from [employer]. Call 9-458.562.5715 or search [employer] online. · You need 7599-2167 mg of calcium and 7335-5295 IU of vitamin D per day. It is possible to meet your calcium requirement with diet alone, but a vitamin D supplement is usually necessary to meet this goal.  · When exposed to the sun, use a sunscreen that protects against both UVA and UVB radiation with an SPF of 30 or greater. Reapply every 2 to 3 hours or after sweating, drying off with a towel, or swimming. · Always wear a seat belt when traveling in a car. Always wear a helmet when riding a bicycle or motorcycle.

## 2022-06-14 LAB
A/G RATIO: 2.2 (ref 1.1–2.2)
ALBUMIN SERPL-MCNC: 4.6 G/DL (ref 3.4–5)
ALP BLD-CCNC: 117 U/L (ref 40–129)
ALT SERPL-CCNC: 18 U/L (ref 10–40)
ANION GAP SERPL CALCULATED.3IONS-SCNC: 16 MMOL/L (ref 3–16)
AST SERPL-CCNC: 24 U/L (ref 15–37)
BILIRUB SERPL-MCNC: 0.4 MG/DL (ref 0–1)
BUN BLDV-MCNC: 10 MG/DL (ref 7–20)
CALCIUM SERPL-MCNC: 9.6 MG/DL (ref 8.3–10.6)
CHLORIDE BLD-SCNC: 102 MMOL/L (ref 99–110)
CHOLESTEROL, FASTING: 132 MG/DL (ref 0–199)
CO2: 23 MMOL/L (ref 21–32)
CREAT SERPL-MCNC: 0.7 MG/DL (ref 0.6–1.2)
ESTIMATED AVERAGE GLUCOSE: 119.8 MG/DL
GFR AFRICAN AMERICAN: >60
GFR NON-AFRICAN AMERICAN: >60
GLUCOSE FASTING: 144 MG/DL (ref 70–99)
HBA1C MFR BLD: 5.8 %
HDLC SERPL-MCNC: 56 MG/DL (ref 40–60)
LDL CHOLESTEROL CALCULATED: 50 MG/DL
POTASSIUM SERPL-SCNC: 4.2 MMOL/L (ref 3.5–5.1)
SODIUM BLD-SCNC: 141 MMOL/L (ref 136–145)
T4 FREE: 1.6 NG/DL (ref 0.9–1.8)
TOTAL PROTEIN: 6.7 G/DL (ref 6.4–8.2)
TRIGLYCERIDE, FASTING: 130 MG/DL (ref 0–150)
TSH SERPL DL<=0.05 MIU/L-ACNC: 2.31 UIU/ML (ref 0.27–4.2)
VLDLC SERPL CALC-MCNC: 26 MG/DL

## 2022-06-22 ENCOUNTER — HOSPITAL ENCOUNTER (OUTPATIENT)
Dept: MAMMOGRAPHY | Age: 77
Discharge: HOME | End: 2022-06-22
Payer: COMMERCIAL

## 2022-06-22 DIAGNOSIS — Z78.0 POSTMENOPAUSAL: ICD-10-CM

## 2022-06-22 PROCEDURE — 77080 DXA BONE DENSITY AXIAL: CPT

## 2022-06-30 ENCOUNTER — OFFICE VISIT (OUTPATIENT)
Dept: CARDIOLOGY CLINIC | Age: 77
End: 2022-06-30
Payer: COMMERCIAL

## 2022-06-30 VITALS
SYSTOLIC BLOOD PRESSURE: 124 MMHG | WEIGHT: 260 LBS | BODY MASS INDEX: 37.22 KG/M2 | HEIGHT: 70 IN | HEART RATE: 69 BPM | DIASTOLIC BLOOD PRESSURE: 72 MMHG

## 2022-06-30 DIAGNOSIS — I10 PRIMARY HYPERTENSION: Primary | ICD-10-CM

## 2022-06-30 DIAGNOSIS — R06.02 SOB (SHORTNESS OF BREATH): ICD-10-CM

## 2022-06-30 DIAGNOSIS — Z01.818 PRE-OP EXAMINATION: ICD-10-CM

## 2022-06-30 DIAGNOSIS — E78.2 MIXED HYPERLIPIDEMIA: ICD-10-CM

## 2022-06-30 DIAGNOSIS — E66.01 SEVERE OBESITY (BMI 35.0-39.9) WITH COMORBIDITY (HCC): ICD-10-CM

## 2022-06-30 DIAGNOSIS — R94.31 ABNORMAL EKG: ICD-10-CM

## 2022-06-30 PROCEDURE — 93000 ELECTROCARDIOGRAM COMPLETE: CPT | Performed by: INTERNAL MEDICINE

## 2022-06-30 PROCEDURE — 1123F ACP DISCUSS/DSCN MKR DOCD: CPT | Performed by: INTERNAL MEDICINE

## 2022-06-30 PROCEDURE — 99214 OFFICE O/P EST MOD 30 MIN: CPT | Performed by: INTERNAL MEDICINE

## 2022-06-30 NOTE — PROGRESS NOTES
CARDIOLOGY  NOTE    Chief Complaint: Abnormal EKG    HPI:   Kennedi Wallace is a 68y.o. year old who has Past medical history as noted below. Retired cardiology nurse , had ekg showing septal q wavs and small QRS complexes ,  She had knee replacement surgery in OCt 2022, she has lost 50 lbs since surgery and feels it helped her breathing a lot ,    she is planned for  Left knee surgery now and is here for pre op eval  She had normal stress test in Sept 2022  Her EKG has historically showed small QRS complex but echo shows no effusion   She says her BP runs above 135  Range, echos shows mild to moderate aortic and mitral valve regurg, left atrium is enlarged   She has SOB on exertion but no ankle swelling , she has Hypertension , she retired 15 years ago  She is on coreg and was supposed to start maxide but has not started it as her bp is well controlled  She denies any chest pain or arm pain , no family history of cardiac issues   She has h/o HEp A and had abnormal LFT for along time and was diagnosed of autoimmune condition?         Current Outpatient Medications   Medication Sig Dispense Refill    acetaminophen (TYLENOL) 500 MG tablet Take 500 mg by mouth every 6 hours as needed for Pain Taking BID      carvedilol (COREG) 12.5 MG tablet TAKE 1 TABLET BY MOUTH TWICE A  tablet 2    simvastatin (ZOCOR) 20 MG tablet TAKE 1 TABLET BY MOUTH EVERY DAY AT NIGHT 90 tablet 2    venlafaxine (EFFEXOR XR) 150 MG extended release capsule TAKE 1 CAPSULE BY MOUTH EVERY DAY 90 capsule 2    baclofen (LIORESAL) 10 MG tablet TAKE 1 TABLET BY MOUTH NIGHTLY AS NEEDED (BACK SPASM) 90 tablet 2    levothyroxine (SYNTHROID) 125 MCG tablet TAKE 1 TABLET BY MOUTH EVERY DAY 90 tablet 3    traMADol (ULTRAM) 50 MG tablet TABLET BY MOUTH EVERY 4 TO 6 HOURS AS NEEDED FOR PAIN      Cholecalciferol (VITAMIN D3) 2000 units CAPS Take 2,000 Units by mouth every morning       Flaxseed, Linseed, (FLAXSEED OIL) 1200 MG CAPS Take 1,200 mg by mouth every morning       famotidine (PEPCID) 20 MG tablet Take 20 mg by mouth nightly       therapeutic multivitamin-minerals (THERAGRAN-M) tablet Take 1 tablet by mouth every morning       Ascorbic Acid (VITAMIN C) 500 MG tablet Take 1,000 mg by mouth every morning        No current facility-administered medications for this visit. Allergies:   Ace inhibitors and Nsaids    Patient History:  Past Medical History:   Diagnosis Date    Cancer ([facility] [address]     ovarian - contained    COVID-19 10/11/2021    GERD (gastroesophageal reflux disease)     H/O cardiovascular stress test 2021    Normal EF 63 % with normal ventricular contractility. No infarct or ischemia noted    H/O echocardiogram 2021    EF is estimated at 55-60%.     Hepatitis A     Hyperlipidemia     Hypertension     Hypothyroidism     [facility]      Past Surgical History:   Procedure Laterality Date    ACHILLES TENDON SURGERY Left 2005    ANKLE SURGERY      reconstruction    COLONOSCOPY  , , 10/23/2017, 2018    polyp (2018), polyp (); normal (95)    HYSTERECTOMY, TOTAL ABDOMINAL (CERVIX REMOVED)      THYROIDECTOMY, PARTIAL       Family History   Problem Relation Age of Onset    Breast Cancer Mother     Colon Cancer Mother     Glaucoma Mother     Kidney Cancer Father     Kidney Disease Father     Glaucoma Maternal Grandmother      Social History     Tobacco Use    Smoking status: Former Smoker     Packs/day: 1.50     Years: 15.00     Pack years: 22.50     Types: Cigarettes     Quit date: 10/1/1970     Years since quittin.7    Smokeless tobacco: Never Used   Substance Use Topics    Alcohol use: Yes     Comment: rarely        Review of Systems:   · Constitutional: No Fever or Weight Loss   · Eyes: No Decreased Vision  · ENT: No Headaches, Hearing Loss or Vertigo  · Cardiovascular: as per note above   · Respiratory: No cough or wheezing and as per note above. · Gastrointestinal: No abdominal pain, appetite loss, blood in stools, constipation, diarrhea or heartburn  · Genitourinary: No dysuria, trouble voiding, or hematuria  · Musculoskeletal:  denies any new  joint aches , swelling  or pain   · Integumentary: No rash or pruritis  · Neurological: No TIA or stroke symptoms  · Psychiatric: No anxiety or depression  · Endocrine: No malaise, fatigue or temperature intolerance  · Hematologic/Lymphatic: No bleeding problems, blood clots or swollen lymph nodes  · Allergic/Immunologic: No nasal congestion or hives    Objective:      Physical Exam:  /72   Pulse 69   Ht 5' 10\" (1.778 m)   Wt 260 lb (117.9 kg)   BMI 37.31 kg/m²   Wt Readings from Last 3 Encounters:   06/30/22 260 lb (117.9 kg)   06/13/22 261 lb (118.4 kg)   10/07/21 279 lb (126.6 kg)     Body mass index is 37.31 kg/m². Vitals:    06/30/22 1023   BP: 124/72   Pulse: 69        General Appearance:  No distress, conversant  Constitutional:  Well developed, Well nourished, No acute distress, Non-toxic appearance. HENT:  Normocephalic, Atraumatic, Bilateral external ears normal, Oropharynx moist, No oral exudates, Nose normal. Neck- Normal range of motion, No tenderness, Supple, No stridor,no apical-carotid delay  Eyes:  PERRL, EOMI, Conjunctiva normal, No discharge. Respiratory:  Normal breath sounds, No respiratory distress, No wheezing, No chest tenderness. ,no use of accessory muscles, NO crackles  Cardiovascular: (PMI) apex non displaced,no lifts no thrills,S1 and S2 audible, No added heart sounds, No signs of ankle edema, or volume overload, No evidence of JVD, No crackles  GI:  Bowel sounds normal, Soft, No tenderness, No masses, No gross visceromegaly   :  No costovertebral angle tenderness   Musculoskeletal:  No edema, no tenderness, no deformities.  Back- no tenderness  Integument:  Well hydrated, no rash   Lymphatic:  No lymphadenopathy noted   Neurologic:  Alert & oriented x 3, CN 2-12 normal, normal motor function, normal sensory function, no focal deficits noted   Psychiatric:  Speech and behavior appropriate       Medical decision making and Data review:  DATA:  No results found for: TROPONINT  BNP:    Lab Results   Component Value Date    PROBNP 110.2 09/09/2021     PT/INR:  No results found for: PTINR  Lab Results   Component Value Date    LABA1C 5.8 06/13/2022    LABA1C 5.6 08/20/2021     Lab Results   Component Value Date    CHOL 130 10/09/2019    TRIG 218 (H) 10/09/2019    HDL 56 06/13/2022    LDLCALC 50 06/13/2022    LDLDIRECT 57 07/12/2017     Lab Results   Component Value Date    ALT 18 06/13/2022    AST 24 06/13/2022     No results for input(s): WBC, HGB, HCT, MCV, PLT in the last 72 hours. TSH:   Lab Results   Component Value Date    TSH 2.31 06/13/2022     Lab Results   Component Value Date    AST 24 06/13/2022    ALT 18 06/13/2022    BILIDIR 0.2 09/19/2018    BILITOT 0.4 06/13/2022    ALKPHOS 117 06/13/2022     Lab Results   Component Value Date    PROBNP 110.2 09/09/2021     Lab Results   Component Value Date    LABA1C 5.8 06/13/2022    LABA1C 5.6 08/20/2021     Lab Results   Component Value Date    WBC 6.5 06/13/2022    HGB 14.8 06/13/2022    HCT 43.3 06/13/2022     06/13/2022     Echo 9/16/21  Summary   Left ventricular function and size is normal, EF is estimated at 55-60%. Moderate left ventricular hypertrophy. Grade I diastolic dysfunction. No regional wall motion abnormalities were detected. Moderately dilated left atrium. Sclerotic, but non-stenotic aortic valve. Mitral annular calcification is present. Mild mitral. tricuspid and moderate aortic regurgitation is present. RVSP is 17 mmHg. No evidence of pericardial effusion. Stress test  9/16/21  Summary    Supervising physician Dr. Tre Daly .    Normal EF 63 % with normal ventricular contractility.  No infarct or ischemia    noted.    Normal stress myocardial perfusion.    This is a normal study. All labs, medications and tests reviewed by myself including data and history from outside source , patient and available family . Assessment & Plan:      1. Primary hypertension    2. Mixed hyperlipidemia    3. Severe obesity (BMI 35.0-39. 9) with comorbidity ([address]    4. SOB (shortness of breath)    5. Abnormal EKG    6. Pre-op examination         Preop  Low risk proceed with surgery , no further testing needed   Baseline EKG is abnormal with small QRS and loos of progression of R waves but stress test was normal     Abnormal EKG  Small QRS complex and septal Q waves , echo shows no effusion , body habitus/    Multivalvular disease  Moderate AI and Mild MR , continue to monitor , needs good BP control     SOB (shortness of breath)  Multifactorial  Reports shortness of breath with exertion , Bnp is normal   Echo shows LVH and dilated left atrium     Hypertension   Advised to check blood pressure at home currently blood pressures well controlled. continue coreg 12.5 mg bid, she was started on maxide but she has not started it        Dyslipidemia :  All available lab work was reviewed. Patient was advised to repeat lab work before next visit. Necessary orders were placed , instructions given by myself    The 10-year ASCVD risk score (Anna Siddiqi, et al., 2013) is: 21.6%    Values used to calculate the score:      Age: 68 years      Sex: Female      Is Non- : No      Diabetic: No      Tobacco smoker: No      Systolic Blood Pressure: 317 mmHg      Is BP treated: Yes      HDL Cholesterol: 56 mg/dL      Total Cholesterol: 132 mg/dL    Counseled extensively and medication compliance urged. We discussed that for the  prevention of ASCVD our  goal is aggressive risk modification. Patient is encouraged to exercise if they can , educated about  brisk walk for 30 minutes  at least 3 to 4 times a week if there are no physical limitations  Various goals were discussed and questions answered. Continue current medications. Appropriate prescriptions are addressed and refills ordered. Questions answered and patient verbalizes understanding. Call for any problems, questions, or concerns. Greater than 60 % of time spent counseling besides reviewing data and images     Continue all other medications of all above medical condition listed as is. No follow-ups on file. Please note this report has been partially produced using speech recognition software and may contain errors related to that system including errors in grammar, punctuation, and spelling, as well as words and phrases that may be inappropriate. If there are any questions or concerns please feel free to contact the dictating provider for clarification.

## 2022-07-30 PROBLEM — Z01.818 PRE-OP EXAMINATION: Status: RESOLVED | Noted: 2022-06-30 | Resolved: 2022-07-30

## 2022-08-02 LAB
ALBUMIN SERPL-MCNC: 4.1 G/DL
ALP BLD-CCNC: 114 U/L
ALT SERPL-CCNC: 24 U/L
ANION GAP SERPL CALCULATED.3IONS-SCNC: 1.9 MMOL/L
AST SERPL-CCNC: 31 U/L
BASOPHILS ABSOLUTE: 0 /ΜL
BASOPHILS RELATIVE PERCENT: 0 %
BILIRUB SERPL-MCNC: 0.5 MG/DL (ref 0.1–1.4)
BUN BLDV-MCNC: 11 MG/DL
C-REACTIVE PROTEIN: 6
CALCIUM SERPL-MCNC: 9.5 MG/DL
CHLORIDE BLD-SCNC: 102 MMOL/L
CO2: 28 MMOL/L
CREAT SERPL-MCNC: 0.68 MG/DL
EOSINOPHILS ABSOLUTE: 0 /ΜL
EOSINOPHILS RELATIVE PERCENT: 0 %
GFR CALCULATED: 90
GLUCOSE BLD-MCNC: 123 MG/DL
HCT VFR BLD CALC: 43.1 % (ref 36–46)
HEMOGLOBIN: 14.6 G/DL (ref 12–16)
LYMPHOCYTES ABSOLUTE: 1.9 /ΜL
LYMPHOCYTES RELATIVE PERCENT: 27 %
MCH RBC QN AUTO: 32.4 PG
MCHC RBC AUTO-ENTMCNC: 33.9 G/DL
MCV RBC AUTO: 96 FL
MONOCYTES ABSOLUTE: 0.6 /ΜL
MONOCYTES RELATIVE PERCENT: 9 %
NEUTROPHILS ABSOLUTE: 4.5 /ΜL
NEUTROPHILS RELATIVE PERCENT: 64 %
PDW BLD-RTO: 12.6 %
PLATELET # BLD: 281 K/ΜL
PMV BLD AUTO: NORMAL FL
POTASSIUM SERPL-SCNC: 4.6 MMOL/L
RBC # BLD: 4.5 10^6/ΜL
SODIUM BLD-SCNC: 142 MMOL/L
TOTAL PROTEIN: 6.3
WBC # BLD: 7.1 10^3/ML

## 2022-09-19 ENCOUNTER — HOSPITAL ENCOUNTER (OUTPATIENT)
Dept: PHYSICAL THERAPY | Age: 77
Setting detail: THERAPIES SERIES
Discharge: HOME | End: 2022-09-19
Payer: COMMERCIAL

## 2022-09-19 PROCEDURE — 97016 VASOPNEUMATIC DEVICE THERAPY: CPT

## 2022-09-19 PROCEDURE — 97110 THERAPEUTIC EXERCISES: CPT

## 2022-09-19 PROCEDURE — 97162 PT EVAL MOD COMPLEX 30 MIN: CPT

## 2022-09-19 NOTE — POST SEDATION
Outpatient Physical Therapy           [place]           [] Phone: [phone]   Fax: [phone]  [place]           [x] Phone: [phone]   Fax: [phone]     To: ERIK Reardon   From: Luis Alberto Gaston, PT,    Patient: Rickey Jay       : 1945  Diagnosis: Presence of left artificial knee joint [Z96.652]  Pain in left knee [M25.562] Diagnosis: L TKA 8/10/22  Treatment Diagnosis: difficulty walking  Date: 2022    Physical Therapy Certification/Re-Certification Form    The following patient has been evaluated for physical therapy services and for therapy to continue, insurance requires physician review of the treatment plan initially and every 90 days. Please review the attached evaluation and/or summary of the patient's plan of care, and verify that you agree therapy should continue by signing the attached document and sending it back to our office.     Assessment:    Assessment: TUG 13 sec without cane    Plan of Care/Treatment to date:  [x] Therapeutic Exercise  [] Modalities:  [x] Therapeutic Activity     [] Ultrasound  [] Electrical Stimulation  [x] Gait Training      [] Cervical Traction [] Lumbar Traction  [x] Neuromuscular Re-education    [] Cold/hotpack [] Iontophoresis   [x] Instruction in HEP      [x] Vasopneumatic    [] Dry Needling  [x] Manual Therapy               [] Aquatic Therapy       Other:          Frequency/Duration:  # Days per week: [] 1 day # Weeks: [] 1 week [] 5 weeks     [x] 2 days   [] 2 weeks [x] 6 weeks     [] 3 days   [] 3 weeks [] 7 weeks     [] 4 days   [] 4 weeks [] 8 weeks         [] 9 weeks [] 10 weeks         [] 11 weeks [] 12 weeks    Rehab Potential/Progress: [] Excellent [x] Good [] Fair  [] Poor     Goals:    Patient goals: L leg to function as well as R    Long Term Goals  Time Frame for Long Term Goals: 6 weeks - paln expires 22  patient independent with HEP  patient will complete TUG in 11 sec without cane  patient will scor 39/80 on LEFS and will report less difficulty with the specific daily task ofwalking between room (now3) and getting in/out of bathtub (3)  Electronically signed by:  Vicente Reed PT,  9/19/2022, 5:18 PM  If you have any questions or concerns, please don't hesitate to call.   Thank you for your referral.      Physician Signature:________________________________Date:_________ TIME: _____  By signing above, therapists plan is approved by physician

## 2022-09-19 NOTE — PROGRESS NOTES
Physical Therapy: Initial Evaluation    Patient: Khoa Ziegler (46 y.o. female)   Examination Date:   Plan of Care Certification Period: 2022 to        :  1945 ;    Confirmed:  MRN: [id number]  CSN: [id number]   Insurance: Payor: Candi Wright / Plan: Candi Wright / Product Type: *No Product type* /   Insurance ID: [id number] - (Medicare Managed) Secondary Insurance (if applicable):    Referring Physician: ERIK Swenson   PCP: SUJEY Blair NP Visits to Date/Visits Approved:   /      No Show/Cancelled Appts:   /       Medical Diagnosis: Presence of left artificial knee joint [Z96.652]  Pain in left knee [M25.562] L TKA 8/10/22  Treatment Diagnosis: difficulty walking     PERTINENT MEDICAL HISTORY   Patient Assessed for Rehabilitation Services: Yes       Medical History: Chart Reviewed: Yes   Past Medical History:   Diagnosis Date    Cancer ([facility] [address]     ovarian - contained    COVID-19 10/11/2021    GERD (gastroesophageal reflux disease)     H/O cardiovascular stress test 2021    Normal EF 63 % with normal ventricular contractility. No infarct or ischemia noted    H/O echocardiogram 2021    EF is estimated at 55-60%.     Hepatitis A     Hyperlipidemia     Hypertension     Hypothyroidism     Sjogren's disease [facility])      Surgical History:   Past Surgical History:   Procedure Laterality Date    ACHILLES TENDON SURGERY Left     ANKLE SURGERY      reconstruction    COLONOSCOPY  , , 10/23/2017, 2018    polyp (2018), polyp (); normal (95)    HYSTERECTOMY, TOTAL ABDOMINAL (CERVIX REMOVED)      THYROIDECTOMY, PARTIAL         Medications:   Current Outpatient Medications:     acetaminophen (TYLENOL) 500 MG tablet, Take 500 mg by mouth every 6 hours as needed for Pain Taking BID, Disp: , Rfl:     carvedilol (COREG) 12.5 MG tablet, TAKE 1 TABLET BY MOUTH TWICE A DAY, Disp: 180 tablet, Rfl: 2    simvastatin (ZOCOR) 20 MG tablet, TAKE 1 TABLET BY MOUTH EVERY DAY AT NIGHT, Disp: 90 tablet, Rfl: 2    venlafaxine (EFFEXOR XR) 150 MG extended release capsule, TAKE 1 CAPSULE BY MOUTH EVERY DAY, Disp: 90 capsule, Rfl: 2    baclofen (LIORESAL) 10 MG tablet, TAKE 1 TABLET BY MOUTH NIGHTLY AS NEEDED (BACK SPASM), Disp: 90 tablet, Rfl: 2    levothyroxine (SYNTHROID) 125 MCG tablet, TAKE 1 TABLET BY MOUTH EVERY DAY, Disp: 90 tablet, Rfl: 3    traMADol (ULTRAM) 50 MG tablet, TABLET BY MOUTH EVERY 4 TO 6 HOURS AS NEEDED FOR PAIN, Disp: , Rfl:     Cholecalciferol (VITAMIN D3) 2000 units CAPS, Take 2,000 Units by mouth every morning , Disp: , Rfl:     Flaxseed, Linseed, (FLAXSEED OIL) 1200 MG CAPS, Take 1,200 mg by mouth every morning , Disp: , Rfl:     famotidine (PEPCID) 20 MG tablet, Take 20 mg by mouth nightly , Disp: , Rfl:     therapeutic multivitamin-minerals (THERAGRAN-M) tablet, Take 1 tablet by mouth every morning , Disp: , Rfl:     Ascorbic Acid (VITAMIN C) 500 MG tablet, Take 1,000 mg by mouth every morning , Disp: , Rfl:   Allergies: Ace inhibitors and Nsaids      SUBJECTIVE EXAMINATION      ,           Subjective History:    Subjective: slp L TKA 8/10/22-  1 night stay; [facility] PT stopped 9/5/22; sleeping well; RW used inside home; driving for past 3 weeks  Additional Pertinent Hx (if applicable): R TKA 09/31/12          Learning/Language:       Pain Screening   Pain Screening  Patient Currently in Pain: No    Functional Status         Social History:  Social History  Lives With: Alone  Home Layout: One level  Home Access: Stairs to enter without rails  Entrance Stairs - Number of Steps: 1+1  Bathroom Shower/Tub: Tub/Shower unit, Shower chair with back    Occupation/Interests:  Occupation: Retired    Prior Level of Function:    Independent        Current Level of Function:  now uses cane/ RW      ADL Assistance: Independent  Homemaking Assistance: Independent  Ambulation Assistance: Independent (uses cane/ walker)  Transfer Assistance: Independent  Active : Yes    OBJECTIVE EXAMINATION   Restrictions:        Position Activity Restriction  Other position/activity restrictions: none    Review of Systems:  Overall Orientation Status: Within Normal Limits  Patient affect[de-identified] Normal  Follows Commands: Within Functional Limits    VBI Screening / Lumbar Screening:        Regional Screen:         Observations:incision healing       Palpation:        Ambulation/Gait (if applicable):ambulating with SPC       Balance Screen:       Neuro Screen:    Left AROM  Right AROM          Knee FLEX 112* to -10*          Left PROM  Right PROM                    Left Strength  Right Strength       L knee EXT 3+/5             Cervical Assessment             Thoracic Assessment            Lumbar Assessment           Trunk Strength           Muscle Length/Flexibility:      Joint Mobility (if applicable):        Special Tests:        Balance/Gait Assessment(s) Performed: Additional Finding(s) (if applicable):           ASSESSMENT     Impression: Assessment: TUG 13 sec without cane    Body Structures, Functions, Activity Limitations Requiring Skilled Therapeutic Intervention: Decreased ROM, Decreased body mechanics, Decreased functional mobility , Decreased high-level IADLs    Statement of Medical Necessity: Physical Therapy is both indicated and medically necessary as outlined in the POC to increase the likelihood of meeting the functionally related goals stated below. Patient's Activity Tolerance:        Patient's rehabilitation potential/prognosis is considered to be:       Factors which may impact rehabilitation potential include:          GOALS   Patient Goal(s): R leg to function as well as L  Short Term Goals Completed by   Goal Status                                                                   Long Term Goals Completed by 6 weeks - plan expires 11/5/22 Goal Status   patient independent with HEP     patient will complete TUG in 11 sec without cane patient will scor 39/80 on LEFS and will report less difficulty with the specific daily task ofwalking between room (now3) and getting in/out of bathtub (3)                                                    TREATMENT PLAN            Pt. actively involved in establishing Plan of Care and Goals: Yes  Patient/ Caregiver education and instruction:               Treatment may include any combination of the following:       Frequency / Duration:  Patient to be seen   for   weeks      Eval Complexity:    History: Personal Factors and/or Comorbidities Impacting POC: Medium  History: PF- L knee OA  Exam: TUIG/ROM  Clinical Presentation: evolving    PT Treatment Completed: Therapy Time  Individual Time In: 1       Individual Time Out: 1625  Minutes: 57  Timed Code Treatment Minutes: 25 Minutes     Therapist Signature: Roberto Oneil PT    Date: 4/09/8191     I certify that the above Therapy Services are being furnished while the patient is under my care. I agree with the treatment plan and certify that this therapy is necessary. Physician's Signature:  ___________________________   Date:_______                                                                   ERIK Edward        Physician Comments: _______________________________________________    Please sign and return to [address] Please fax to the location listed below.  [facility] YOU for this referral!    [address]  [address] [place] [address]  Dept: [address]: [phone]       POC NOTE

## 2022-09-19 NOTE — FLOWSHEET NOTE
Outpatient Physical Therapy  [place]           [] Phone: [phone]   Fax: [phone]  [place]           [x] Phone: [phone]   Fax: [phone]        Physical Therapy Daily Treatment Note  Date:  2022    Patient Name:  Evans Pedroza    :  1945  MRN: [id number]  Restrictions/Precautions: No data recorded   Position Activity Restriction  Other position/activity restrictions: none  Diagnosis:   Presence of left artificial knee joint [Z96.652]  Pain in left knee [M25.562] Diagnosis: L TKA 8/10/22  Date of Injury/Surgery:   Treatment Diagnosis:  difficulty walking  Insurance/Certification information: Medicare advantage no pre cert BOMN  Referring Physician:  ERIK Gomez   PCP: SUJEY Nguyen NP  Next Doctor Visit:    Plan of care signed (Y/N):    Outcome Measure: TUG 13 sec without cane  Visit# / total visits:   1/10 then PN  Pain level: 0/10   Goals:     Patient goals: L leg to function as well as R  Long Term Goals  Time Frame for Long Term Goals: 6 weeks - paln expires 22  patient independent with HEP  patient will complete TUG in 11 sec without cane  patient will score 39/80 on LEFS and will report less difficulty with the specific daily task ofwalking between room (now3) and getting in/out of bathtub (3)            Summary of Evaluation:  Assessment: TUG 13 sec without cane        Subjective:  See eval         Any changes in Ambulatory Summary Sheet?   None        Objective:  See eval   COVID screening questions were asked and patient attested that there had been no contact or symptoms        Exercises: (No more than 4 columns)   Exercise/Equipment Date 22 Date Date           WARM UP         nustep Seat 11/ a marielle 13 load 3 x10'     recumbent Seat 6 x3'     TABLE      Quad sets Towel under knee     SAQ Small 5ct x15     SLR  1 x10     Heel prop 3'        Heel  slides With strap     STANDING      Incline board 1.5' PROPRIOCEPTION                                    MODALITIES See below                     Other Therapeutic Activities/Education:        Home Exercise Program:  heel prop      Manual Treatments:        Modalities:  Patient received vasocompression on their L knee  for pain and inflammation for 15 min on low pressure. Patient had negative skin reaction afterwards. Communication with other providers:        Assessment:  (Response towards treatment session) (Pain Rating)  Pt tolerated  treatment without any adverse reactions or complications this date. . Pt would continue to benefit from skilled therapy interventions to address remaining impairments, improve mobility and strength,  and progress toward goal completion and prepare for d/c including finalizing HEP ;  while reducing risk for re-injury or further decline. Pain complaints after session 0/10       Plan for Next Session:        Time In / Time Out:    see eval          Timed Code/Total Treatment Minutes:  22' TE x2/ 62' includes vaso and eval      Next Progress Note due:        Plan of Care Interventions:  [] Therapeutic Exercise  [] Modalities:  [] Therapeutic Activity     [] Ultrasound  [] Estim  [] Gait Training      [] Cervical Traction [] Lumbar Traction  [] Neuromuscular Re-education    [] Cold/hotpack [] Iontophoresis   [] Instruction in HEP      [] Vasopneumatic   [] Dry Needling    [] Manual Therapy               [] Aquatic Therapy              Electronically signed by:  Roberto Oneil PT, 9/19/2022, 5:18 PM

## 2022-09-21 ENCOUNTER — APPOINTMENT (OUTPATIENT)
Dept: PHYSICAL THERAPY | Age: 77
End: 2022-09-21
Payer: COMMERCIAL

## 2022-09-26 ENCOUNTER — HOSPITAL ENCOUNTER (OUTPATIENT)
Dept: PHYSICAL THERAPY | Age: 77
Setting detail: THERAPIES SERIES
Discharge: HOME | End: 2022-09-26
Payer: COMMERCIAL

## 2022-09-26 PROCEDURE — 97016 VASOPNEUMATIC DEVICE THERAPY: CPT

## 2022-09-26 PROCEDURE — 97110 THERAPEUTIC EXERCISES: CPT

## 2022-09-26 NOTE — FLOWSHEET NOTE
Outpatient Physical Therapy  [place]           [] Phone: [phone]   Fax: [phone]  Joyce Syed           [x] Phone: [phone]   Fax: [phone]        Physical Therapy Daily Treatment Note  Date:  2022    Patient Name:  Kassandra Hendrix    :  1945  MRN: [id number]  Restrictions/Precautions: No data recorded   Position Activity Restriction  Other position/activity restrictions: none  Diagnosis:   Presence of left artificial knee joint [Z96.652]  Pain in left knee [M25.562] Diagnosis: L TKA 8/10/22  Date of Injury/Surgery:   Treatment Diagnosis:  difficulty walking  Insurance/Certification information: Medicare advantage no pre cert BOMN  Referring Physician:  ERIK Bo   PCP: SUJEY Garcia - NP  Next Doctor Visit:    Plan of care signed (Y/N):    Outcome Measure: TUG 13 sec without cane  Visit# / total visits:   /10 then PN  Pain level: 310   Goals:     Patient goals: L leg to function as well as R  Long Term Goals  Time Frame for Long Term Goals: 6 weeks - paln expires 22  patient independent with HEP  patient will complete TUG in 11 sec without cane  patient will score 39/80 on LEFS and will report less difficulty with the specific daily task ofwalking between room (now3) and getting in/out of bathtub (3)            Summary of Evaluation:  Assessment: TUG 13 sec without cane        Subjective:  Patient rates her knee pain 3/10 today. Knee is feeling pretty good. Has not been having to take the pain medication regularly. Any changes in Ambulatory Summary Sheet?   None        Objective:  Knee Flexion with strap: 120* , lack 6* of knee extension   COVID screening questions were asked and patient attested that there had been no contact or symptoms        Exercises: (No more than 4 columns)   Exercise/Equipment Date 22 Date Date  2022           WARM UP         nustep Seat 11/ a marielle 13 load 3 x10' Seat 11/ a marielle 13 load 3 x10' Seat 11/ a marielle 13 load 3 x10'   recumbent Seat 6 x3' Seat 6 x 5 min  Seat 6 x 5 min    TABLE      Quad sets Towel under knee  Towel roll under knee 10x10\"   SAQ Small 5ct x15  Small 20x5\"   SLR  1 x10  10x   Heel prop 3'        Heel  slides With strap  10x10\" with strap   STANDING      Incline board 1.5'  1.5 min    High knee marching   3 laps                                      PROPRIOCEPTION                                    MODALITIES See below  See below                   Other Therapeutic Activities/Education:        Home Exercise Program:  heel prop      Manual Treatments:        Modalities:  Patient received vasocompression on their L knee  for pain and inflammation for 15 min on low pressure. Patient had negative skin reaction afterwards. Communication with other providers:        Assessment:  (Response towards treatment session) (Pain Rating) Denied any pain at the end of treatment. Gained 4* of knee extension since evaluation. Pt tolerated  treatment without any adverse reactions or complications this date. . Pt would continue to benefit from skilled therapy interventions to address remaining impairments, improve mobility and strength,  and progress toward goal completion and prepare for d/c including finalizing HEP ;  while reducing risk for re-injury or further decline. Pain complaints after session 0/10       Plan for Next Session:        Time In / Time Out:    1348/1452        Timed Code/Total Treatment Minutes:  64' (15' Vaso, 41' TE)    Next Progress Note due:        Plan of Care Interventions:  [] Therapeutic Exercise  [] Modalities:  [] Therapeutic Activity     [] Ultrasound  [] Estim  [] Gait Training      [] Cervical Traction [] Lumbar Traction  [] Neuromuscular Re-education    [] Cold/hotpack [] Iontophoresis   [] Instruction in HEP      [] Vasopneumatic   [] Dry Needling    [] Manual Therapy               [] Aquatic Therapy              Electronically signed by:  Delicia Salamanca PTA  9/26/2022, 1:57 PM

## 2022-09-30 ENCOUNTER — HOSPITAL ENCOUNTER (OUTPATIENT)
Dept: PHYSICAL THERAPY | Age: 77
Setting detail: THERAPIES SERIES
Discharge: HOME | End: 2022-09-30
Payer: COMMERCIAL

## 2022-09-30 PROCEDURE — 97016 VASOPNEUMATIC DEVICE THERAPY: CPT

## 2022-09-30 PROCEDURE — 97110 THERAPEUTIC EXERCISES: CPT

## 2022-09-30 NOTE — FLOWSHEET NOTE
Outpatient Physical Therapy  [place]           [] Phone: [phone]   Fax: [phone]  Odilon Tomlinson           [x] Phone: [phone]   Fax: [phone]        Physical Therapy Daily Treatment Note  Date:  2022    Patient Name:  Doris Bettencourt    :  1945  MRN: [id number]  Restrictions/Precautions: No data recorded   Position Activity Restriction  Other position/activity restrictions: none  Diagnosis:   Presence of left artificial knee joint [Z96.652]  Pain in left knee [M25.562] Diagnosis: L TKA 8/10/22  Date of Injury/Surgery:   Treatment Diagnosis:  difficulty walking  Insurance/Certification information: Medicare advantage no pre cert BOMN  Referring Physician:  ERIK Alexander   PCP: SUJEY Myers - NP  Next Doctor Visit:    Plan of care signed (Y/N):    Outcome Measure: TUG 13 sec without cane  Visit# / total visits:   3/10 then PN  Pain level: 2/10   Goals:     Patient goals: L leg to function as well as R  Long Term Goals  Time Frame for Long Term Goals: 6 weeks - paln expires 22  patient independent with HEP  patient will complete TUG in 11 sec without cane  patient will score 39/80 on LEFS and will report less difficulty with the specific daily task ofwalking between room (now3) and getting in/out of bathtub (3)            Summary of Evaluation:  Assessment: TUG 13 sec without cane        Subjective:  Patient reports of 2/10 pain upon arrival and appears amb w/SPC. Any changes in Ambulatory Summary Sheet?   None        Objective:  AAROM Flex  120*      Ext -5*  COVID screening questions were asked and patient attested that there had been no contact or symptoms        Exercises: (No more than 4 columns)   Exercise/Equipment Date 22 Date Date  2022            WARM UP          nustep Seat 11/ a marielle 13 load 3 x10' Seat 11/ a marielle 13 load 3 x10' Seat 11/ a marielle 13 load 3 x10'   S11-10/A13 Lv 3  10'   recumbent Seat 6 x3' Seat 6 x 5 min  Seat 6 x 5 min  S6 5'   TABLE       Quad sets Towel under knee  Towel roll under knee 10x10\" Towel roll under knee 10x10\"   SAQ Small 5ct x15  Small 20x5\" Small 20x5\"   SLR  1 x10  10x Initiate w/QS 10x  5x   Heel prop 3'         Heel  slides With strap  10x10\" with strap 7x10\" with strap   STANDING       Incline board 1.5'  1.5 min  1.5'   High knee marching   3 laps 3 laps                                           PROPRIOCEPTION                                          MODALITIES See below  See below See below                     Other Therapeutic Activities/Education:        Home Exercise Program:  heel prop      Manual Treatments:        Modalities:  Patient received vasocompression on their L knee  for pain and inflammation for 10 min on low pressure. Patient had negative skin reaction afterwards. Communication with other providers:        Assessment:  (Response towards treatment session) (Pain Rating) Denied any pain at the end of treatment. Pt tolerated  treatment without any adverse reactions or complications this date. . Pt would continue to benefit from skilled therapy interventions to address remaining impairments, improve mobility and strength,  and progress toward goal completion and prepare for d/c including finalizing HEP ;  while reducing risk for re-injury or further decline. Pain complaints after session 0/10       Plan for Next Session:        Time In / Time Out:    1300//1354        Timed Code/Total Treatment Minutes:  54  10vaso 44te    Next Progress Note due:        Plan of Care Interventions:  [] Therapeutic Exercise  [] Modalities:  [] Therapeutic Activity     [] Ultrasound  [] Estim  [] Gait Training      [] Cervical Traction [] Lumbar Traction  [] Neuromuscular Re-education    [] Cold/hotpack [] Iontophoresis   [] Instruction in HEP      [] Vasopneumatic   [] Dry Needling    [] Manual Therapy               [] Aquatic Therapy              Electronically signed by:  Miguelina Arias Augusta Aj, PT, Mehran Cerrato PTA  9/30/2022, 1:02 PM

## 2022-10-03 ENCOUNTER — HOSPITAL ENCOUNTER (OUTPATIENT)
Dept: PHYSICAL THERAPY | Age: 77
Discharge: HOME | End: 2022-10-03

## 2022-10-03 NOTE — FLOWSHEET NOTE
Physical Therapy  Cancellation/No-show Note  Patient Name:  Melvin Rae  :  1945   Date:  10/3/2022  Cancelled visits to date: 1  No-shows to date: 0    For today's appointment patient:  [x]  Cancelled  []  Rescheduled appointment  []  No-show     Reason given by patient:  [x]  Patient ill  []  Conflicting appointment  []  No transportation    []  Conflict with work  []  No reason given  []  Other:     Comments:      Electronically signed by:  Rikki Mayes PTA

## 2022-10-07 ENCOUNTER — HOSPITAL ENCOUNTER (OUTPATIENT)
Dept: PHYSICAL THERAPY | Age: 77
Setting detail: THERAPIES SERIES
Discharge: HOME | End: 2022-10-07
Payer: COMMERCIAL

## 2022-10-07 PROCEDURE — 97110 THERAPEUTIC EXERCISES: CPT

## 2022-10-07 NOTE — FLOWSHEET NOTE
Outpatient Physical Therapy  [place]           [] Phone: [phone]   Fax: [phone]  Gina Mosher           [x] Phone: [phone]   Fax: [phone]        Physical Therapy Daily Treatment Note  Date:  10/7/2022    Patient Name:  Ad Oglesby    :  1945  MRN: [id number]  Restrictions/Precautions: No data recorded   Position Activity Restriction  Other position/activity restrictions: none  Diagnosis:   Presence of left artificial knee joint [Z96.652]  Pain in left knee [M25.562] Diagnosis: L TKA 8/10/22  Date of Injury/Surgery:   Treatment Diagnosis:  difficulty walking  Insurance/Certification information: Medicare advantage no pre cert BOMN  Referring Physician:  ERIK Hawk   PCP: SUJEY Calixto NP  Next Doctor Visit:    Plan of care signed (Y/N):    Outcome Measure: TUG 13 sec without cane  Visit# / total visits:   4/10 then PN  Pain level: 2/10   Goals:     Patient goals: L leg to function as well as R  Long Term Goals  Time Frame for Long Term Goals: 6 weeks - paln expires 22  patient independent with HEP  patient will complete TUG in 11 sec without cane  patient will score 39/80 on LEFS and will report less difficulty with the specific daily task ofwalking between room (now3) and getting in/out of bathtub (3)            Summary of Evaluation:  Assessment: TUG 13 sec without cane        Subjective:  Patient reports of 2/10 pain upon arrival and appears amb w/SPC. Any changes in Ambulatory Summary Sheet?   None        Objective:  AAROM Flex  122*      Ext -5*  COVID screening questions were asked and patient attested that there had been no contact or symptoms        Exercises: (No more than 4 columns)   Exercise/Equipment Date  2022 9/30/22 10/7/22           WARM UP         nustep Seat 11/ a marielle 13 load 3 x10'   S11-10/A13 Lv 3  10' S10/A13  Lv3     recumbent Seat 6 x 5 min  S6 5' S6  5'   TABLE      Quad sets Towel roll under knee 10x10\" Towel roll under knee 10x10\" Towel roll under knee 10x10\"   SAQ Small 20x5\" Small 20x5\" Small 20x5\"   SLR  10x Initiate w/QS 10x  5x Initiate w/QS  3x5\" w/ankle on small roll    Heel prop         Heel  slides 10x10\" with strap 7x10\" with strap 5x10\" w/strap   STANDING      Incline board 1.5 min  1.5' 1.5'   High knee marching 3 laps 3 laps 3 laps                                      PROPRIOCEPTION                                    MODALITIES See below See below Declined                    Other Therapeutic Activities/Education:        Home Exercise Program:  heel prop      Manual Treatments:        Modalities:  Patient received vasocompression on their L knee  for pain and inflammation for 10 min on low pressure. Patient had negative skin reaction afterwards. - Declined      Communication with other providers:        Assessment:  (Response towards treatment session) (Pain Rating) Denied any pain at the end of treatment. Pt tolerated  treatment without any adverse reactions or complications this date. . Pt would continue to benefit from skilled therapy interventions to address remaining impairments, improve mobility and strength,  and progress toward goal completion and prepare for d/c including finalizing HEP ;  while reducing risk for re-injury or further decline. Pain complaints after session 0/10       Plan for Next Session:        Time In / Time Out:    2186-5574        Timed Code/Total Treatment Minutes:   47te    Next Progress Note due:        Plan of Care Interventions:  [] Therapeutic Exercise  [] Modalities:  [] Therapeutic Activity     [] Ultrasound  [] Estim  [] Gait Training      [] Cervical Traction [] Lumbar Traction  [] Neuromuscular Re-education    [] Cold/hotpack [] Iontophoresis   [] Instruction in HEP      [] Vasopneumatic   [] Dry Needling    [] Manual Therapy               [] Aquatic Therapy              Electronically signed by:  Tova Cervantes PTA  10/7/2022, 1:08 PM

## 2022-10-10 ENCOUNTER — HOSPITAL ENCOUNTER (OUTPATIENT)
Dept: PHYSICAL THERAPY | Age: 77
Setting detail: THERAPIES SERIES
Discharge: HOME | End: 2022-10-10
Payer: COMMERCIAL

## 2022-10-10 PROCEDURE — 97530 THERAPEUTIC ACTIVITIES: CPT

## 2022-10-10 PROCEDURE — 97110 THERAPEUTIC EXERCISES: CPT

## 2022-10-10 NOTE — FLOWSHEET NOTE
x10'   TABLE       Quad sets Towel roll under knee 10x10\" Towel roll under knee 10x10\" Initiate w/QS  3x5\" w/ankle on small rol Initiate w/QS  3x5\" w/ankle on small rol   SAQ Small 20x5\" Small 20x5\" Small 20x5\" FR 20 x5\"   SLR  10x Initiate w/QS 10x  5x Initiate w/QS  3x5\" w/ankle on small roll  Initiate w/QS  3x5\" w/ankle on small rol      Heel  slides 10x10\" with strap 7x10\" with strap 5x10\" w/strap `15x10\" w/strap   STANDING       Incline board 1.5 min  1.5' 1.5' 1.5'   High knee marching 3 laps 3 laps 3 laps 3 laps                                           PROPRIOCEPTION                                          MODALITIES See below See below Declined  ----                     Other Therapeutic Activities/Education:        Home Exercise Program:  heel prop      Manual Treatments:        Modalities:  Patient received vasocompression on their L knee  for pain and inflammation for 10 min on low pressure. Patient had negative skin reaction afterwards. - Declined      Communication with other providers:        Assessment:  (Response towards treatment session) (Pain Rating) Denied any pain at the end of treatment. Pt tolerated  treatment without any adverse reactions or complications this date. . Pt would continue to benefit from skilled therapy interventions to address remaining impairments, improve mobility and strength,  and progress toward goal completion and prepare for d/c including finalizing HEP ;  while reducing risk for re-injury or further decline. Pain complaints after session 0/10       Plan for Next Session:        Time In / Time Out:    7598-5846      Timed Code/Total Treatment Minutes:  36' 2 TE/1 TA  Next Progress Note due:        Plan of Care Interventions:  [] Therapeutic Exercise  [] Modalities:  [] Therapeutic Activity     [] Ultrasound  [] Estim  [] Gait Training      [] Cervical Traction [] Lumbar Traction  [] Neuromuscular Re-education    [] Cold/hotpack [] Iontophoresis   [] Instruction in HEP      [] Vasopneumatic   [] Dry Needling    [] Manual Therapy               [] Aquatic Therapy              Electronically signed by:  Kathy Marrero PT,  10/10/2022, 1:04 PM

## 2022-10-15 DIAGNOSIS — E03.9 ACQUIRED HYPOTHYROIDISM: ICD-10-CM

## 2022-10-17 RX ORDER — LEVOTHYROXINE SODIUM 0.12 MG/1
TABLET ORAL
Qty: 90 TABLET | Refills: 1 | OUTPATIENT
Start: 2022-10-17

## 2022-10-18 ENCOUNTER — HOSPITAL ENCOUNTER (OUTPATIENT)
Dept: PHYSICAL THERAPY | Age: 77
Setting detail: THERAPIES SERIES
Discharge: HOME | End: 2022-10-18
Payer: COMMERCIAL

## 2022-10-18 DIAGNOSIS — E03.9 ACQUIRED HYPOTHYROIDISM: ICD-10-CM

## 2022-10-18 PROCEDURE — 97530 THERAPEUTIC ACTIVITIES: CPT

## 2022-10-18 PROCEDURE — 97110 THERAPEUTIC EXERCISES: CPT

## 2022-10-18 RX ORDER — LEVOTHYROXINE SODIUM 0.12 MG/1
125 TABLET ORAL DAILY
Qty: 90 TABLET | Refills: 2 | Status: SHIPPED | OUTPATIENT
Start: 2022-10-18

## 2022-10-18 NOTE — FLOWSHEET NOTE
Outpatient Physical Therapy  [place]           [] Phone: [phone]   Fax: [phone]  Nara campos           [x] Phone: [phone]   Fax: [phone]        Physical Therapy Daily Treatment Note  Date:  10/18/2022    Patient Name:  Doris Bettencourt    :  1945  MRN: [id number]  Restrictions/Precautions: No data recorded   Position Activity Restriction  Other position/activity restrictions: none  Diagnosis:   Presence of left artificial knee joint [Z96.652]  Pain in left knee [M25.562] Diagnosis: L TKA 8/10/22  Date of Injury/Surgery:   Treatment Diagnosis:  difficulty walking  Insurance/Certification information: Medicare advantage no pre cert BOMN  Referring Physician:  ERIK Alexander   PCP: SUJEY Myers - NP  Next Doctor Visit:    Plan of care signed (Y/N):    Outcome Measure: TUG 13 sec without cane  Visit# / total visits:   /10 then PN  Pain level: 0/10   Goals:     Patient goals: L leg to function as well as R  Long Term Goals  Time Frame for Long Term Goals: 6 weeks - paln expires 22  patient independent with HEP  patient will complete TUG in 11 sec without cane  patient will score 39/80 on LEFS and will report less difficulty with the specific daily task of walking between room (now3) and getting in/out of bathtub (3)            Summary of Evaluation:  Assessment: TUG 13 sec without cane        Subjective:  patient reports sleeping well lately      Any changes in Ambulatory Summary Sheet?   None        Objective:  L knee FLEX 118*  COVID screening questions were asked and patient attested that there had been no contact or symptoms        Exercises: (No more than 4 columns)   Exercise/Equipment 9/30/22 10/7/22 10/10/22 10/18/22            WARM UP          nustep   S11-10/A13 Lv 3  10' S10/A13  Lv3   S10/A13  Lv3  S10/A13  Lv3    recumbent S6 5' S6  5' S6 x10' S6 x10'   TABLE       Quad sets Towel roll under knee 10x10\" Towel roll under knee 10x10\" Towel roll under knee 10x10\" No towel roll 10ct x10 reps   SAQ Small 20x5\" Small 20x5\" FR 20 x5\" FR 20 x5\"   SLR  Initiate w/QS 10x  5x Initiate w/QS  3x5\" w/ankle on small roll  Initiate w/QS  3x5\" w/ankle on small rol 2 x10      Heel  slides 7x10\" with strap 5x10\" w/strap `15x10\" w/strap 10 x10\" with strap   STANDING       Incline board 1.5' 1.5' 1.5' 1.5'   High knee marching 3 laps 3 laps 3 laps 3 laps                                           PROPRIOCEPTION                                          MODALITIES See below Declined  ---- ---                     Other Therapeutic Activities/Education:        Home Exercise Program:  heel prop      Manual Treatments:        Modalities:  Patient received vasocompression on their L knee  for pain and inflammation for 10 min on low pressure. Patient had negative skin reaction afterwards. - Declined      Communication with other providers:        Assessment:  (Response towards treatment session) (Pain Rating) Denied any pain at the end of treatment. Pt tolerated  treatment without any adverse reactions or complications this date. . Pt would continue to benefit from skilled therapy interventions to address remaining impairments, improve mobility and strength,  and progress toward goal completion and prepare for d/c including finalizing HEP ;  while reducing risk for re-injury or further decline. Pain complaints after session 0/10       Plan for Next Session:        Time In / Time Out:    3558-8483      Timed Code/Total Treatment Minutes:  52' 2 TE/1 TA  Next Progress Note due:        Plan of Care Interventions:  [] Therapeutic Exercise  [] Modalities:  [] Therapeutic Activity     [] Ultrasound  [] Estim  [] Gait Training      [] Cervical Traction [] Lumbar Traction  [] Neuromuscular Re-education    [] Cold/hotpack [] Iontophoresis   [] Instruction in HEP      [] Vasopneumatic   [] Dry Needling    [] Manual Therapy               [] Aquatic Therapy Electronically signed by:  Rina Mena, PT,  10/18/2022, 2:47 PM

## 2022-10-25 ENCOUNTER — HOSPITAL ENCOUNTER (OUTPATIENT)
Dept: PHYSICAL THERAPY | Age: 77
Setting detail: THERAPIES SERIES
Discharge: HOME | End: 2022-10-25
Payer: COMMERCIAL

## 2022-10-25 PROCEDURE — 97110 THERAPEUTIC EXERCISES: CPT

## 2022-10-25 PROCEDURE — 97530 THERAPEUTIC ACTIVITIES: CPT

## 2022-10-25 NOTE — FLOWSHEET NOTE
Outpatient Physical Therapy  [place]           [] Phone: [phone]   Fax: [phone]  Germaine Leonard           [x] Phone: [phone]   Fax: [phone]        Physical Therapy Daily Treatment Note  Date:  10/25/2022    Patient Name:  Stephanie Miller    :  1945  MRN: [id number]  Restrictions/Precautions: No data recorded   Position Activity Restriction  Other position/activity restrictions: none  Diagnosis:   Presence of left artificial knee joint [Z96.652]  Pain in left knee [M25.562] Diagnosis: L TKA 8/10/22  Date of Injury/Surgery:   Treatment Diagnosis:  difficulty walking  Insurance/Certification information: Medicare advantage no pre cert BOMN  Referring Physician:  ERIK Stoner   PCP: SUJEY Chang NP  Next Doctor Visit:    Plan of care signed (Y/N):    Outcome Measure: TUG 13 sec without cane  Visit# / total visits:   /10 then PN  Pain level: 0/10   Goals:     Patient goals: L leg to function as well as R  Long Term Goals  Time Frame for Long Term Goals: 6 weeks - paln expires 22  patient independent with HEP  patient will complete TUG in 11 sec without cane  patient will score 39/80 on LEFS and will report less difficulty with the specific daily task of walking between room (now3) and getting in/out of bathtub (3)            Summary of Evaluation:  Assessment: TUG 13 sec without cane        Subjective:  Patient denies any knee pain today. 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 Noticing some increased redness over the incision which surgeon is aware of      Any changes in Ambulatory Summary Sheet?   None        Objective:  L knee FLEX 122*  Increased redness over the incision    COVID screening questions were asked and patient attested that there had been no contact or symptoms        Exercises: (No more than 4 columns)   Exercise/Equipment 10/7/22 10/10/22 10/18/22 10/25/2022            WARM UP          nustep S10/A13  Lv3   S10/A13  Lv3 S10/A13  Lv3  S10/A13  Lv3   X 10 min    recumbent S6  5' S6 x10' S6 x10' S6 x10'   TABLE       Quad sets Towel roll under knee 10x10\" Towel roll under knee 10x10\" No towel roll 10ct x10 reps No towel roll 10x10\"   SAQ Small 20x5\" FR 20 x5\" FR 20 x5\" FR 20x5\"   SLR  Initiate w/QS  3x5\" w/ankle on small roll  Initiate w/QS  3x5\" w/ankle on small rol 2 x10 2x10      Heel  slides 5x10\" w/strap `15x10\" w/strap 10 x10\" with strap 10x10\" with strap   STANDING       Incline board 1.5' 1.5' 1.5' 1.5 min    High knee marching 3 laps 3 laps 3 laps 3 laps                                           PROPRIOCEPTION                                          MODALITIES Declined  ---- ---                      Other Therapeutic Activities/Education:        Home Exercise Program:  heel prop      Manual Treatments:        Modalities:  Patient received vasocompression on their L knee  for pain and inflammation for 10 min on low pressure. Patient had negative skin reaction afterwards. - Declined      Communication with other providers:        Assessment:  (Response towards treatment session) (Pain Rating) Denied any joint pain at the end of treatment. Good ROM noted with heel slides today. Patient to contact the surgeon's office if incision gets much worse, but does have an appointment there next week. Pt tolerated  treatment without any adverse reactions or complications this date. . Pt would continue to benefit from skilled therapy interventions to address remaining impairments, improve mobility and strength,  and progress toward goal completion and prepare for d/c including finalizing HEP ;  while reducing risk for re-injury or further decline. Pain complaints after session 0/10       Plan for Next Session:        Time In / Time Out:    3681/1349      Timed Code/Total Treatment Minutes:  50' 2 TE/1 TA  Next Progress Note due:        Plan of Care Interventions:  [] Therapeutic Exercise  [] Modalities:  [] Therapeutic Activity     [] Ultrasound  [] Estim  [] Gait Training      [] Cervical Traction [] Lumbar Traction  [] Neuromuscular Re-education    [] Cold/hotpack [] Iontophoresis   [] Instruction in HEP      [] Vasopneumatic   [] Dry Needling    [] Manual Therapy               [] Aquatic Therapy              Electronically signed by:  Jarod Grier PTA  10/25/2022, 12:59 PM

## 2022-11-15 NOTE — DISCHARGE SUMMARY
Outpatient Physical Therapy           [place]           [] Phone: [phone]   Fax: [phone]  [place]ara campos           [x] Phone: [phone]   Fax: [phone]      To: Claudean Fusi, PA     From: Richardson Tiwari PT,    Patient: Ciara Jones                    : 1945  Diagnosis:  Presence of left artificial knee joint [Z96.652]  Pain in left knee [M25.562]        Treatment Diagnosis:       Date: 11/15/2022  []  Progress Note                [x]  Discharge Note    Evaluation Date:  22   Total Visits to date:  7  Cancels/No-shows to date:      Subjective:  10/25/22 Patient denies any knee pain today. 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 Noticing some increased redness over the incision which surgeon is aware of         Plan of Care/Treatment to date:  [x] Therapeutic Exercise    [] Modalities:  [x] Therapeutic Activity     [] Ultrasound  [] Electrical Stimulation  [x] Gait Training      [] Cervical Traction   [] Lumbar Traction  [x] Neuromuscular Re-education  [] Cold/hotpack [] Iontophoresis  [x] Instruction in HEP      Other:  [x] Manual Therapy       [x]  Vasopneumatic  [] Aquatic Therapy       []   Dry Needle Therapy                      Objective/Significant Findings At Last Visit/Comments:     L knee FLEX 122*  Increased redness over the incision    Assessment:         Goal Status:  [] Achieved [] Partially Achieved  [x] Not Assessed   patient will complete TUG in 11 sec without cane  patient will score 39/80 on LEFS and will report less difficulty with the specific daily task of walking between room (now3) and getting in/out of bathtub (3)              [x] Patient now discharged- has not scheduled further OP PT      Electronically signed by:  Richardson Tiwari PT, , 11/15/2022, 3:51 PM    If you have any questions or concerns, please don't hesitate to call.   Thank you for your referral.

## 2022-11-30 DIAGNOSIS — F33.41 RECURRENT MAJOR DEPRESSIVE DISORDER, IN PARTIAL REMISSION (HCC): ICD-10-CM

## 2022-12-01 RX ORDER — VENLAFAXINE HYDROCHLORIDE 150 MG/1
CAPSULE, EXTENDED RELEASE ORAL
Qty: 90 CAPSULE | Refills: 1 | Status: SHIPPED | OUTPATIENT
Start: 2022-12-01

## 2023-01-01 DIAGNOSIS — M54.50 LOW BACK PAIN: ICD-10-CM

## 2023-01-03 RX ORDER — BACLOFEN 10 MG/1
TABLET ORAL
Qty: 90 TABLET | Refills: 1 | Status: SHIPPED | OUTPATIENT
Start: 2023-01-03

## 2023-01-19 DIAGNOSIS — E78.2 MIXED HYPERLIPIDEMIA: ICD-10-CM

## 2023-01-19 DIAGNOSIS — I10 ESSENTIAL HYPERTENSION: ICD-10-CM

## 2023-01-20 RX ORDER — CARVEDILOL 12.5 MG/1
TABLET ORAL
Qty: 180 TABLET | Refills: 2 | Status: SHIPPED | OUTPATIENT
Start: 2023-01-20

## 2023-01-20 RX ORDER — SIMVASTATIN 20 MG
TABLET ORAL
Qty: 90 TABLET | Refills: 2 | Status: SHIPPED | OUTPATIENT
Start: 2023-01-20

## 2023-04-22 DIAGNOSIS — F33.41 RECURRENT MAJOR DEPRESSIVE DISORDER, IN PARTIAL REMISSION (HCC): ICD-10-CM

## 2023-04-24 RX ORDER — VENLAFAXINE HYDROCHLORIDE 150 MG/1
CAPSULE, EXTENDED RELEASE ORAL
Qty: 90 CAPSULE | Refills: 1 | Status: SHIPPED | OUTPATIENT
Start: 2023-04-24

## 2023-04-27 ENCOUNTER — TELEPHONE (OUTPATIENT)
Dept: FAMILY MEDICINE CLINIC | Age: 78
End: 2023-04-27

## 2023-07-14 SDOH — ECONOMIC STABILITY: HOUSING INSECURITY
IN THE LAST 12 MONTHS, WAS THERE A TIME WHEN YOU DID NOT HAVE A STEADY PLACE TO SLEEP OR SLEPT IN A SHELTER (INCLUDING NOW)?: NO

## 2023-07-14 SDOH — HEALTH STABILITY: PHYSICAL HEALTH: ON AVERAGE, HOW MANY MINUTES DO YOU ENGAGE IN EXERCISE AT THIS LEVEL?: 30 MIN

## 2023-07-14 SDOH — HEALTH STABILITY: PHYSICAL HEALTH: ON AVERAGE, HOW MANY DAYS PER WEEK DO YOU ENGAGE IN MODERATE TO STRENUOUS EXERCISE (LIKE A BRISK WALK)?: 2 DAYS

## 2023-07-14 SDOH — ECONOMIC STABILITY: FOOD INSECURITY: WITHIN THE PAST 12 MONTHS, YOU WORRIED THAT YOUR FOOD WOULD RUN OUT BEFORE YOU GOT MONEY TO BUY MORE.: NEVER TRUE

## 2023-07-14 SDOH — ECONOMIC STABILITY: FOOD INSECURITY: WITHIN THE PAST 12 MONTHS, THE FOOD YOU BOUGHT JUST DIDN'T LAST AND YOU DIDN'T HAVE MONEY TO GET MORE.: NEVER TRUE

## 2023-07-14 SDOH — ECONOMIC STABILITY: INCOME INSECURITY: HOW HARD IS IT FOR YOU TO PAY FOR THE VERY BASICS LIKE FOOD, HOUSING, MEDICAL CARE, AND HEATING?: NOT VERY HARD

## 2023-07-14 SDOH — ECONOMIC STABILITY: TRANSPORTATION INSECURITY
IN THE PAST 12 MONTHS, HAS LACK OF TRANSPORTATION KEPT YOU FROM MEETINGS, WORK, OR FROM GETTING THINGS NEEDED FOR DAILY LIVING?: NO

## 2023-07-14 ASSESSMENT — LIFESTYLE VARIABLES
HOW OFTEN DO YOU HAVE SIX OR MORE DRINKS ON ONE OCCASION: 1
HOW OFTEN DO YOU HAVE A DRINK CONTAINING ALCOHOL: NEVER
HOW OFTEN DO YOU HAVE A DRINK CONTAINING ALCOHOL: 1
HOW MANY STANDARD DRINKS CONTAINING ALCOHOL DO YOU HAVE ON A TYPICAL DAY: PATIENT DOES NOT DRINK
HOW MANY STANDARD DRINKS CONTAINING ALCOHOL DO YOU HAVE ON A TYPICAL DAY: 0

## 2023-07-14 ASSESSMENT — PATIENT HEALTH QUESTIONNAIRE - PHQ9
SUM OF ALL RESPONSES TO PHQ QUESTIONS 1-9: 0
1. LITTLE INTEREST OR PLEASURE IN DOING THINGS: 0
SUM OF ALL RESPONSES TO PHQ QUESTIONS 1-9: 0
2. FEELING DOWN, DEPRESSED OR HOPELESS: 0
SUM OF ALL RESPONSES TO PHQ9 QUESTIONS 1 & 2: 0

## 2023-07-17 ENCOUNTER — OFFICE VISIT (OUTPATIENT)
Dept: FAMILY MEDICINE CLINIC | Age: 78
End: 2023-07-17
Payer: COMMERCIAL

## 2023-07-17 VITALS
SYSTOLIC BLOOD PRESSURE: 126 MMHG | TEMPERATURE: 97.2 F | BODY MASS INDEX: 36.11 KG/M2 | HEIGHT: 70 IN | RESPIRATION RATE: 16 BRPM | OXYGEN SATURATION: 95 % | WEIGHT: 252.2 LBS | DIASTOLIC BLOOD PRESSURE: 87 MMHG | HEART RATE: 67 BPM

## 2023-07-17 DIAGNOSIS — G89.29 CHRONIC BILATERAL LOW BACK PAIN WITHOUT SCIATICA: ICD-10-CM

## 2023-07-17 DIAGNOSIS — Z86.010 HISTORY OF COLON POLYPS: ICD-10-CM

## 2023-07-17 DIAGNOSIS — M54.50 CHRONIC BILATERAL LOW BACK PAIN WITHOUT SCIATICA: ICD-10-CM

## 2023-07-17 DIAGNOSIS — K21.9 GASTROESOPHAGEAL REFLUX DISEASE, UNSPECIFIED WHETHER ESOPHAGITIS PRESENT: ICD-10-CM

## 2023-07-17 DIAGNOSIS — R73.01 IFG (IMPAIRED FASTING GLUCOSE): ICD-10-CM

## 2023-07-17 DIAGNOSIS — F33.41 RECURRENT MAJOR DEPRESSIVE DISORDER, IN PARTIAL REMISSION (HCC): ICD-10-CM

## 2023-07-17 DIAGNOSIS — I10 ESSENTIAL HYPERTENSION: ICD-10-CM

## 2023-07-17 DIAGNOSIS — Z00.00 MEDICARE ANNUAL WELLNESS VISIT, SUBSEQUENT: Primary | ICD-10-CM

## 2023-07-17 DIAGNOSIS — E66.01 SEVERE OBESITY WITH BODY MASS INDEX (BMI) OF 36.0 TO 36.9 WITH SERIOUS COMORBIDITY (HCC): ICD-10-CM

## 2023-07-17 DIAGNOSIS — E78.2 MIXED HYPERLIPIDEMIA: ICD-10-CM

## 2023-07-17 DIAGNOSIS — R09.89 OTHER SPECIFIED SYMPTOMS AND SIGNS INVOLVING THE CIRCULATORY AND RESPIRATORY SYSTEMS: ICD-10-CM

## 2023-07-17 DIAGNOSIS — E03.9 ACQUIRED HYPOTHYROIDISM: ICD-10-CM

## 2023-07-17 PROCEDURE — G0439 PPPS, SUBSEQ VISIT: HCPCS | Performed by: NURSE PRACTITIONER

## 2023-07-17 PROCEDURE — 3074F SYST BP LT 130 MM HG: CPT | Performed by: NURSE PRACTITIONER

## 2023-07-17 PROCEDURE — 99214 OFFICE O/P EST MOD 30 MIN: CPT | Performed by: NURSE PRACTITIONER

## 2023-07-17 PROCEDURE — 3078F DIAST BP <80 MM HG: CPT | Performed by: NURSE PRACTITIONER

## 2023-07-17 PROCEDURE — 1123F ACP DISCUSS/DSCN MKR DOCD: CPT | Performed by: NURSE PRACTITIONER

## 2023-07-17 PROCEDURE — 36415 COLL VENOUS BLD VENIPUNCTURE: CPT | Performed by: NURSE PRACTITIONER

## 2023-07-17 RX ORDER — LEVOTHYROXINE SODIUM 0.12 MG/1
125 TABLET ORAL DAILY
Qty: 90 TABLET | Refills: 4 | Status: SHIPPED | OUTPATIENT
Start: 2023-07-17

## 2023-07-17 RX ORDER — CARVEDILOL 12.5 MG/1
12.5 TABLET ORAL 2 TIMES DAILY
Qty: 180 TABLET | Refills: 4 | Status: SHIPPED | OUTPATIENT
Start: 2023-07-17 | End: 2024-10-09

## 2023-07-17 RX ORDER — VENLAFAXINE HYDROCHLORIDE 75 MG/1
75 CAPSULE, EXTENDED RELEASE ORAL DAILY
Qty: 90 CAPSULE | Refills: 4 | Status: SHIPPED | OUTPATIENT
Start: 2023-07-17 | End: 2024-10-09

## 2023-07-17 RX ORDER — BACLOFEN 10 MG/1
10 TABLET ORAL
Qty: 90 TABLET | Refills: 4 | Status: SHIPPED | OUTPATIENT
Start: 2023-07-17 | End: 2024-10-09

## 2023-07-17 RX ORDER — SIMVASTATIN 20 MG
20 TABLET ORAL NIGHTLY
Qty: 90 TABLET | Refills: 4 | Status: SHIPPED | OUTPATIENT
Start: 2023-07-17

## 2023-07-17 RX ORDER — VENLAFAXINE HYDROCHLORIDE 150 MG/1
150 CAPSULE, EXTENDED RELEASE ORAL DAILY
Qty: 90 CAPSULE | Refills: 4 | Status: SHIPPED | OUTPATIENT
Start: 2023-07-17 | End: 2023-07-17 | Stop reason: DRUGHIGH

## 2023-07-17 ASSESSMENT — PATIENT HEALTH QUESTIONNAIRE - PHQ9
SUM OF ALL RESPONSES TO PHQ9 QUESTIONS 1 & 2: 0
SUM OF ALL RESPONSES TO PHQ QUESTIONS 1-9: 0
10. IF YOU CHECKED OFF ANY PROBLEMS, HOW DIFFICULT HAVE THESE PROBLEMS MADE IT FOR YOU TO DO YOUR WORK, TAKE CARE OF THINGS AT HOME, OR GET ALONG WITH OTHER PEOPLE: 0
1. LITTLE INTEREST OR PLEASURE IN DOING THINGS: 0
3. TROUBLE FALLING OR STAYING ASLEEP: 0
SUM OF ALL RESPONSES TO PHQ QUESTIONS 1-9: 0
5. POOR APPETITE OR OVEREATING: 0
6. FEELING BAD ABOUT YOURSELF - OR THAT YOU ARE A FAILURE OR HAVE LET YOURSELF OR YOUR FAMILY DOWN: 0
4. FEELING TIRED OR HAVING LITTLE ENERGY: 0
9. THOUGHTS THAT YOU WOULD BE BETTER OFF DEAD, OR OF HURTING YOURSELF: 0
SUM OF ALL RESPONSES TO PHQ QUESTIONS 1-9: 0
8. MOVING OR SPEAKING SO SLOWLY THAT OTHER PEOPLE COULD HAVE NOTICED. OR THE OPPOSITE, BEING SO FIGETY OR RESTLESS THAT YOU HAVE BEEN MOVING AROUND A LOT MORE THAN USUAL: 0
SUM OF ALL RESPONSES TO PHQ QUESTIONS 1-9: 0
2. FEELING DOWN, DEPRESSED OR HOPELESS: 0
7. TROUBLE CONCENTRATING ON THINGS, SUCH AS READING THE NEWSPAPER OR WATCHING TELEVISION: 0

## 2023-07-18 ENCOUNTER — TELEPHONE (OUTPATIENT)
Dept: GASTROENTEROLOGY | Age: 78
End: 2023-07-18

## 2023-07-18 LAB
ALBUMIN SERPL-MCNC: 4.1 G/DL (ref 3.4–5)
ALBUMIN/GLOB SERPL: 1.6 {RATIO} (ref 1.1–2.2)
ALP SERPL-CCNC: 116 U/L (ref 40–129)
ALT SERPL-CCNC: 22 U/L (ref 10–40)
ANION GAP SERPL CALCULATED.3IONS-SCNC: 14 MMOL/L (ref 3–16)
AST SERPL-CCNC: 26 U/L (ref 15–37)
BASOPHILS # BLD: 0 K/UL (ref 0–0.2)
BASOPHILS NFR BLD: 0.6 %
BILIRUB SERPL-MCNC: 0.3 MG/DL (ref 0–1)
BUN SERPL-MCNC: 15 MG/DL (ref 7–20)
CALCIUM SERPL-MCNC: 9.6 MG/DL (ref 8.3–10.6)
CHLORIDE SERPL-SCNC: 101 MMOL/L (ref 99–110)
CHOLEST SERPL-MCNC: 124 MG/DL (ref 0–199)
CO2 SERPL-SCNC: 27 MMOL/L (ref 21–32)
CREAT SERPL-MCNC: 0.7 MG/DL (ref 0.6–1.2)
DEPRECATED RDW RBC AUTO: 14.1 % (ref 12.4–15.4)
EOSINOPHIL # BLD: 0 K/UL (ref 0–0.6)
EOSINOPHIL NFR BLD: 0.2 %
EST. AVERAGE GLUCOSE BLD GHB EST-MCNC: 108.3 MG/DL
GFR SERPLBLD CREATININE-BSD FMLA CKD-EPI: >60 ML/MIN/{1.73_M2}
GLUCOSE P FAST SERPL-MCNC: 100 MG/DL (ref 70–99)
HBA1C MFR BLD: 5.4 %
HCT VFR BLD AUTO: 44.3 % (ref 36–48)
HDLC SERPL-MCNC: 53 MG/DL (ref 40–60)
HGB BLD-MCNC: 14.8 G/DL (ref 12–16)
LDL CHOLESTEROL CALCULATED: 48 MG/DL
LYMPHOCYTES # BLD: 1.7 K/UL (ref 1–5.1)
LYMPHOCYTES NFR BLD: 24.1 %
MCH RBC QN AUTO: 32.7 PG (ref 26–34)
MCHC RBC AUTO-ENTMCNC: 33.5 G/DL (ref 31–36)
MCV RBC AUTO: 97.8 FL (ref 80–100)
MONOCYTES # BLD: 0.6 K/UL (ref 0–1.3)
MONOCYTES NFR BLD: 7.9 %
NEUTROPHILS # BLD: 4.8 K/UL (ref 1.7–7.7)
NEUTROPHILS NFR BLD: 67.2 %
PLATELET # BLD AUTO: 305 K/UL (ref 135–450)
PLATELET BLD QL SMEAR: ADEQUATE
PMV BLD AUTO: 8.7 FL (ref 5–10.5)
POTASSIUM SERPL-SCNC: 4.4 MMOL/L (ref 3.5–5.1)
PROT SERPL-MCNC: 6.7 G/DL (ref 6.4–8.2)
RBC # BLD AUTO: 4.52 M/UL (ref 4–5.2)
SLIDE REVIEW: NORMAL
SODIUM SERPL-SCNC: 142 MMOL/L (ref 136–145)
T4 FREE SERPL-MCNC: 1.5 NG/DL (ref 0.9–1.8)
TRIGL SERPL-MCNC: 117 MG/DL (ref 0–150)
TSH SERPL DL<=0.005 MIU/L-ACNC: 1.82 UIU/ML (ref 0.27–4.2)
VLDLC SERPL CALC-MCNC: 23 MG/DL
WBC # BLD AUTO: 7.1 K/UL (ref 4–11)

## 2023-07-18 NOTE — TELEPHONE ENCOUNTER
Called pt. In regards to a referral for GERD.  Made appt for pt to see dr. Sandra Ortega on 8/9/23 @11am

## 2023-07-20 PROBLEM — E66.01 SEVERE OBESITY WITH BODY MASS INDEX (BMI) OF 36.0 TO 36.9 WITH SERIOUS COMORBIDITY (HCC): Status: ACTIVE | Noted: 2023-07-20

## 2023-07-20 PROBLEM — B15.9 VIRAL HEPATITIS A WITHOUT HEPATIC COMA: Status: RESOLVED | Noted: 2018-01-31 | Resolved: 2023-07-20

## 2023-07-20 PROBLEM — R06.02 SOB (SHORTNESS OF BREATH): Status: RESOLVED | Noted: 2021-09-09 | Resolved: 2023-07-20

## 2023-07-20 PROBLEM — K63.5 COLON POLYPS: Status: RESOLVED | Noted: 2018-09-11 | Resolved: 2023-07-20

## 2023-08-08 ENCOUNTER — PROCEDURE VISIT (OUTPATIENT)
Dept: CARDIOLOGY CLINIC | Age: 78
End: 2023-08-08
Payer: COMMERCIAL

## 2023-08-08 DIAGNOSIS — R09.89 OTH SYMPTOMS AND SIGNS INVOLVING THE CIRC AND RESP SYSTEMS: Primary | ICD-10-CM

## 2023-08-08 PROCEDURE — 93922 UPR/L XTREMITY ART 2 LEVELS: CPT | Performed by: INTERNAL MEDICINE

## 2023-08-09 ENCOUNTER — OFFICE VISIT (OUTPATIENT)
Dept: GASTROENTEROLOGY | Age: 78
End: 2023-08-09
Payer: COMMERCIAL

## 2023-08-09 VITALS
SYSTOLIC BLOOD PRESSURE: 146 MMHG | DIASTOLIC BLOOD PRESSURE: 94 MMHG | OXYGEN SATURATION: 97 % | WEIGHT: 254 LBS | HEIGHT: 70 IN | TEMPERATURE: 96.8 F | HEART RATE: 85 BPM | BODY MASS INDEX: 36.36 KG/M2

## 2023-08-09 DIAGNOSIS — D12.6 TUBULAR ADENOMA OF COLON: ICD-10-CM

## 2023-08-09 DIAGNOSIS — Z80.0 FAMILY HISTORY OF COLON CANCER IN MOTHER: ICD-10-CM

## 2023-08-09 DIAGNOSIS — K21.9 GASTROESOPHAGEAL REFLUX DISEASE, UNSPECIFIED WHETHER ESOPHAGITIS PRESENT: Primary | ICD-10-CM

## 2023-08-09 PROCEDURE — 1123F ACP DISCUSS/DSCN MKR DOCD: CPT | Performed by: INTERNAL MEDICINE

## 2023-08-09 PROCEDURE — 99204 OFFICE O/P NEW MOD 45 MIN: CPT | Performed by: INTERNAL MEDICINE

## 2023-08-09 PROCEDURE — 3077F SYST BP >= 140 MM HG: CPT | Performed by: INTERNAL MEDICINE

## 2023-08-09 PROCEDURE — 3080F DIAST BP >= 90 MM HG: CPT | Performed by: INTERNAL MEDICINE

## 2023-08-09 RX ORDER — POLYETHYLENE GLYCOL 3350, SODIUM SULFATE ANHYDROUS, SODIUM BICARBONATE, SODIUM CHLORIDE, POTASSIUM CHLORIDE 236; 22.74; 6.74; 5.86; 2.97 G/4L; G/4L; G/4L; G/4L; G/4L
4 POWDER, FOR SOLUTION ORAL ONCE
Qty: 4000 ML | Refills: 0 | Status: SHIPPED | OUTPATIENT
Start: 2023-08-09 | End: 2023-08-09

## 2023-08-09 RX ORDER — MULTIVIT-MIN/IRON/FOLIC ACID/K 18-600-40
CAPSULE ORAL
COMMUNITY

## 2023-08-09 RX ORDER — OMEPRAZOLE 40 MG/1
40 CAPSULE, DELAYED RELEASE ORAL
Qty: 90 CAPSULE | Refills: 0 | Status: SHIPPED | OUTPATIENT
Start: 2023-08-09

## 2023-08-09 NOTE — PROGRESS NOTES
[facility]             MD Zac Harrison MD Ardebbie Galicia, APRN-CNP             [address] [facility] [address] [facility], [address]              [phone] fax [phone]        Gastroenterology Clinic Consultation    Zac Campa MD  Encounter Date: 08/09/23     CC: Gastroesophageal Reflux and Colonoscopy (Hx of polyps )       Kenyetta Toure, APRN - NP  [address] [facility],  [address]     History obtained from: patient, medical records     Subjective:       Best Watkins is an 68 y. o.  female past medical history of hypertension, hypothyroidism, hyperlipidemia who presents for Gastroesophageal Reflux and Colonoscopy (Hx of polyps )    According to the patient she has been dealing with reflux symptoms. She is currently on Pepcid 20 mg. She deneis any emesis but has a lot of nausea. Denies any dysphagia, no melena or hematochezia. Hb is stable. She has been evaluated by Dr. Mark Del Rosario in the past.  She had a colonoscopy back in September 2018 where she was noted to have a polyp and was told to repeat colonoscopy in 5 years. Prior to that she has had colonoscopies in [place] where she has noted polyps on all of her colonoscopy. She does have family history of colon cancer in her mother at age 61. Her diarrhea has resolved, has hx of Hepatitis A. Has lost 75 lbs due to covid and b/l knee surgery. Most recent LFTs and CBC.       Patient Active Problem List   Diagnosis    Hypertension    Hypothyroidism    History of thyroidectomy, subtotal    Recurrent major depressive disorder, in partial remission (HCC)    Chronic bilateral low back pain without sciatica    JUANJOSE positive    Falls frequently    Impaired fasting glucose    Polyneuropathy, peripheral sensorimotor axonal    Hyperlipidemia    Abnormal EKG    Severe obesity with body mass index (BMI) of 36.0 to 36.9 with serious comorbidity [facility])         Past Medical

## 2023-08-09 NOTE — H&P (VIEW-ONLY)
Substance and Sexual Activity    Alcohol use: Not Currently     Comment: rarely    Drug use: No    Sexual activity: Not Currently     Partners: Male   Other Topics Concern    Not on file   Social History Narrative    Not on file     Social Determinants of Health     Financial Resource Strain: Low Risk     Difficulty of Paying Living Expenses: Not very hard   Food Insecurity: No Food Insecurity    Worried About Running Out of Food in the Last Year: Never true    Ran Out of Food in the Last Year: Never true   Transportation Needs: Unknown    Lack of Transportation (Medical): Not on file    Lack of Transportation (Non-Medical): No   Physical Activity: Insufficiently Active    Days of Exercise per Week: 2 days    Minutes of Exercise per Session: 30 min   Stress: Not on file   Social Connections: Not on file   Intimate Partner Violence: Not on file   Housing Stability: Unknown    Unable to Pay for Housing in the Last Year: Not on file    Number of Places Lived in the Last Year: Not on file    Unstable Housing in the Last Year: No        Social History     Social History Narrative    Not on file           Review of Systems  Reviewed all 14 systems with patient/family member. Pertinent items in HPI.      Medications:    Current Outpatient Medications:     Calcium Carb-Cholecalciferol (CALCIUM-VITAMIN D3) 600-10 MG-MCG CAPS, Take by mouth, Disp: , Rfl:     simvastatin (ZOCOR) 20 MG tablet, Take 1 tablet by mouth nightly, Disp: 90 tablet, Rfl: 4    levothyroxine (SYNTHROID) 125 MCG tablet, Take 1 tablet by mouth Daily, Disp: 90 tablet, Rfl: 4    carvedilol (COREG) 12.5 MG tablet, Take 1 tablet by mouth 2 times daily, Disp: 180 tablet, Rfl: 4    baclofen (LIORESAL) 10 MG tablet, Take 1 tablet by mouth nightly, Disp: 90 tablet, Rfl: 4    venlafaxine (EFFEXOR XR) 75 MG extended release capsule, Take 1 capsule by mouth daily, Disp: 90 capsule, Rfl: 4    acetaminophen (TYLENOL) 500 MG tablet, Take 1 tablet by mouth every 6 hours

## 2023-08-10 ENCOUNTER — TELEPHONE (OUTPATIENT)
Dept: CARDIOLOGY CLINIC | Age: 78
End: 2023-08-10

## 2023-08-10 NOTE — TELEPHONE ENCOUNTER
Called to patient the results of her recent testing  No significant evidence of large vessel arterial occlusive disease of the lower extremities. Patient verbalized understanding of all information given.

## 2023-08-21 ENCOUNTER — ANESTHESIA EVENT (OUTPATIENT)
Dept: OPERATING ROOM | Age: 78
End: 2023-08-21
Payer: COMMERCIAL

## 2023-08-21 NOTE — ANESTHESIA PRE PROCEDURE
Department of Anesthesiology  Preprocedure Note       Name:  Best Watkins   Age:  68 y.o.  :  1945                                          MRN:  [id number]         Date:  2023      Surgeon: Carlos Donato):  Zac Campa MD    Procedure: Procedure(s):  COLONOSCOPY DIAGNOSTIC  EGD BIOPSY    Medications prior to admission:   Prior to Admission medications    Medication Sig Start Date End Date Taking?  Authorizing Provider   Calcium Carb-Cholecalciferol (CALCIUM-VITAMIN D3) 600-10 MG-MCG CAPS Take by mouth    Historical Provider, MD   omeprazole (PRILOSEC) 40 MG delayed release capsule Take 1 capsule by mouth every morning (before breakfast) 23   Zac Campa MD   simvastatin (ZOCOR) 20 MG tablet Take 1 tablet by mouth nightly 23   SUJEY Diallo NP   levothyroxine (SYNTHROID) 125 MCG tablet Take 1 tablet by mouth Daily 23   SUJEY Diallo NP   carvedilol (COREG) 12.5 MG tablet Take 1 tablet by mouth 2 times daily 7/17/23 10/9/24  SUJEY Diallo NP   baclofen (LIORESAL) 10 MG tablet Take 1 tablet by mouth nightly 7/17/23 10/9/24  SUJEY Diallo NP   venlafaxine (EFFEXOR XR) 75 MG extended release capsule Take 1 capsule by mouth daily 7/17/23 10/9/24  SUJEY Diallo NP   acetaminophen (TYLENOL) 500 MG tablet Take 1 tablet by mouth every 6 hours as needed for Pain Taking BID    Historical Provider, MD   Cholecalciferol (VITAMIN D3) 2000 units CAPS Take 1 capsule by mouth every morning    Historical Provider, MD   Flaxseed, Linseed, (FLAXSEED OIL) 1200 MG CAPS Take 1,200 mg by mouth every morning     Historical Provider, MD   famotidine (PEPCID) 20 MG tablet Take 1 tablet by mouth nightly    Historical Provider, MD   therapeutic multivitamin-minerals (THERAGRAN-M) tablet Take 1 tablet by mouth every morning    Historical Provider, MD   Ascorbic Acid (VITAMIN C) 500 MG tablet Take 2 tablets by mouth every morning    Historical Provider, MD       Current

## 2023-08-23 RX ORDER — DIPHENHYDRAMINE HCL 25 MG
25 CAPSULE ORAL EVERY 6 HOURS PRN
COMMUNITY

## 2023-08-23 NOTE — PROGRESS NOTES
Surgery Date: 08/28/2023                                   Arrive at: 1200  Surgery time: 1330     Come in the main entrance. Two visitors may accompany you to the hospital.  Everyone must be free of covid symptoms. 1. Do not eat or drink anything after midnight - unless instructed by your doctor prior to surgery. This includes: no water, ice chips, chewing gum or mints. 2. Follow your directions as prescribed by the doctor for your procedure and medications. Take the following medications with a small sip of water the morning of: carvedilol, levothyroxine, and prilosec              3. Check with your Doctor regarding stopping Plavix, Coumadin, Lovenox, Effient, Pradaxa, Xarelto, Fragmin or other blood thinners and follow their instructions. 4. Do not smoke and do not drink any alcoholic beverages 24 hours prior to surgery. 5. You may brush your teeth and gargle the morning of surgery. DO NOT SWALLOW WATER  6. Please wear simple, loose fitting clothing to the hospital.  Torin Youwilner not bring valuables (money, credit cards, checkbooks, etc.) Do not wear any makeup (including no eye makeup) or nail polish on your fingers or toes. 7.  DO NOT wear any jewelry or piercings on day of surgery. All body piercing jewelry must be removed. 8.  Take a shower the night before or morning of your procedure, do not apply any lotion, oil or powder. 9. If you have dentures, they will be removed before going to the OR; we will provide you a container. If you wear contact lenses or glasses, they will be removed; please bring a case for them. 10. If you  have a Living Will and Durable Power of  for Healthcare, please bring in a copy. 11. Please bring picture ID,  insurance card, paperwork from the doctors office    (H & P, Consent, & card for implantable devices).            12. You MUST make arrangements for a responsible

## 2023-08-28 ENCOUNTER — ANESTHESIA (OUTPATIENT)
Dept: OPERATING ROOM | Age: 78
End: 2023-08-28
Payer: COMMERCIAL

## 2023-08-28 ENCOUNTER — HOSPITAL ENCOUNTER (OUTPATIENT)
Age: 78
Setting detail: OUTPATIENT SURGERY
Discharge: HOME | End: 2023-08-28
Attending: INTERNAL MEDICINE | Admitting: INTERNAL MEDICINE
Payer: COMMERCIAL

## 2023-08-28 VITALS
DIASTOLIC BLOOD PRESSURE: 84 MMHG | WEIGHT: 254 LBS | HEART RATE: 62 BPM | OXYGEN SATURATION: 98 % | RESPIRATION RATE: 16 BRPM | BODY MASS INDEX: 36.36 KG/M2 | HEIGHT: 70 IN | SYSTOLIC BLOOD PRESSURE: 166 MMHG | TEMPERATURE: 96.4 F

## 2023-08-28 DIAGNOSIS — K21.9 GASTROESOPHAGEAL REFLUX DISEASE, UNSPECIFIED WHETHER ESOPHAGITIS PRESENT: ICD-10-CM

## 2023-08-28 DIAGNOSIS — Z80.0 FAMILY HISTORY OF COLON CANCER: ICD-10-CM

## 2023-08-28 DIAGNOSIS — D12.6 TUBULAR ADENOMA OF COLON: ICD-10-CM

## 2023-08-28 PROCEDURE — 88305 TISSUE EXAM BY PATHOLOGIST: CPT

## 2023-08-28 PROCEDURE — 2500000003 HC RX 250 WO HCPCS: Performed by: NURSE ANESTHETIST, CERTIFIED REGISTERED

## 2023-08-28 PROCEDURE — 2580000003 HC RX 258: Performed by: INTERNAL MEDICINE

## 2023-08-28 PROCEDURE — 7100000010 HC PHASE II RECOVERY - FIRST 15 MIN: Performed by: INTERNAL MEDICINE

## 2023-08-28 PROCEDURE — 3609012400 HC EGD TRANSORAL BIOPSY SINGLE/MULTIPLE: Performed by: INTERNAL MEDICINE

## 2023-08-28 PROCEDURE — 3700000001 HC ADD 15 MINUTES (ANESTHESIA): Performed by: INTERNAL MEDICINE

## 2023-08-28 PROCEDURE — 2709999900 HC NON-CHARGEABLE SUPPLY: Performed by: INTERNAL MEDICINE

## 2023-08-28 PROCEDURE — 3700000000 HC ANESTHESIA ATTENDED CARE: Performed by: INTERNAL MEDICINE

## 2023-08-28 PROCEDURE — 7100000011 HC PHASE II RECOVERY - ADDTL 15 MIN: Performed by: INTERNAL MEDICINE

## 2023-08-28 PROCEDURE — 88342 IMHCHEM/IMCYTCHM 1ST ANTB: CPT

## 2023-08-28 PROCEDURE — 3609010600 HC COLONOSCOPY POLYPECTOMY SNARE/COLD BIOPSY: Performed by: INTERNAL MEDICINE

## 2023-08-28 RX ORDER — LIDOCAINE HYDROCHLORIDE 20 MG/ML
INJECTION, SOLUTION EPIDURAL; INFILTRATION; INTRACAUDAL; PERINEURAL PRN
Status: DISCONTINUED | OUTPATIENT
Start: 2023-08-28 | End: 2023-08-28 | Stop reason: SDUPTHER

## 2023-08-28 RX ORDER — SODIUM CHLORIDE, SODIUM LACTATE, POTASSIUM CHLORIDE, CALCIUM CHLORIDE 600; 310; 30; 20 MG/100ML; MG/100ML; MG/100ML; MG/100ML
INJECTION, SOLUTION INTRAVENOUS CONTINUOUS
Status: DISCONTINUED | OUTPATIENT
Start: 2023-08-28 | End: 2023-08-28 | Stop reason: HOSPADM

## 2023-08-28 RX ADMIN — LIDOCAINE HYDROCHLORIDE 100 MG: 20 INJECTION, SOLUTION EPIDURAL; INFILTRATION; INTRACAUDAL; PERINEURAL at 13:59

## 2023-08-28 RX ADMIN — SODIUM CHLORIDE, POTASSIUM CHLORIDE, SODIUM LACTATE AND CALCIUM CHLORIDE: 600; 310; 30; 20 INJECTION, SOLUTION INTRAVENOUS at 12:55

## 2023-08-28 ASSESSMENT — PAIN SCALES - GENERAL
PAINLEVEL_OUTOF10: 0
PAINLEVEL_OUTOF10: 0

## 2023-08-28 ASSESSMENT — PAIN - FUNCTIONAL ASSESSMENT: PAIN_FUNCTIONAL_ASSESSMENT: 0-10

## 2023-08-28 NOTE — PROGRESS NOTES
Patient returned to room from Endoscopy. Report received bedside from [facility]. Bed brakes applied and VS obtained. Tele monitor on. See flow sheets. Patient A/O x4. Drink and snack offered. Will continue to monitor. Call light in reach.

## 2023-08-28 NOTE — DISCHARGE INSTRUCTIONS
[facility] in [address] (Same Day Surgery)    Do not drive, work around [address] or use equipment. Do not drink any alcoholic beverages. Do not smoke while alone. Avoid making important decisions. Plan to spend a quiet, relaxed evening @ home. Resume normal activities as you begin to feel better. Eat lightly for your first meal, then gradually increase your diet to what is normal for you. In case of nausea, avoid food and drink only clear liquids. Resume food as nausea ceases. Notify your surgeon if you experience fever, chills, large amount of bleeding, difficulty breathing, persistent nausea and vomiting or any other disturbing problem. Call for a follow-up appointment with your surgeon. Colon Polyps: Care Instructions  Your Care Instructions     Colon polyps are growths in the colon or the rectum. The cause of most colon polyps is not known, and most people who get them do not have any problems. But a certain kind can turn into cancer. For this reason, regular testing for colon polyps is important for people as they get older. It is also important for anyone who has an increased risk for colon cancer. Polyps are usually found through routine colon cancer screening tests. Although most colon polyps are not cancerous, they are usually removed and then tested for cancer. Screening for colon cancer saves lives because the cancer can usually be cured if it is caught early. If you have a polyp that is the type that can turn into cancer, you may need more tests to examine your entire colon. The doctor will remove any other polyps that are found, and you will be tested more often. Follow-up care is a key part of your treatment and safety. Be sure to make and go to all appointments, and call your doctor if you are having problems. It's also a good idea to know your test results and keep a list of the medicines you take. How can you care for yourself at home?   Regular

## 2023-08-28 NOTE — PROGRESS NOTES
Patient remains A/O x4. VS stable. Patient drank diet pepsi without c/o nausea or vomiting. Patient ready for discharge home. Verbal and written discharge instructions reviewed with patient and sister. They signed and received copies after voicing understanding to all. Patient instructed to call MD's office with any questions that arise. Patient voices understanding.

## 2023-08-28 NOTE — INTERVAL H&P NOTE
Update History & Physical    The patient's History and Physical of August 9, 2023 was reviewed with the patient and I examined the patient. There was no change. The surgical site was confirmed by the patient and me. Plan: The risks, benefits, expected outcome, and alternative to the recommended procedure have been discussed with the patient. Patient understands and wants to proceed with the procedure.      Electronically signed by Bhakti Haynes MD on 8/28/2023 at 12:43 PM

## 2023-08-28 NOTE — ANESTHESIA POSTPROCEDURE EVALUATION
Department of Anesthesiology  Postprocedure Note    Patient: Radha Witt  MRN: [id number]  YOB: 1945  Date of evaluation: 8/28/2023      Procedure Summary     Date: 08/28/23 Room / Location: [address] [place] [address] 01 / [facility]    Anesthesia Start: 1352 Anesthesia Stop: 7423    Procedures:       COLONOSCOPY POLYPECTOMY SNARE/COLD BIOPSY      EGD BIOPSY Diagnosis:       Tubular adenoma of colon      Family history of colon cancer      Gastroesophageal reflux disease, unspecified whether esophagitis present      (Tubular adenoma of colon [D12.6])      (Family history of colon cancer [Z80.0])      (Gastroesophageal reflux disease, unspecified whether esophagitis present [K21.9])    Surgeons: Wilmer Garcia MD Responsible Provider: SUJEY King CRNA    Anesthesia Type: MAC ASA Status: 3          Anesthesia Type: No value filed.     Dipti Phase I:      Dipti Phase II:        Anesthesia Post Evaluation    Patient location during evaluation: bedside  Patient participation: complete - patient participated  Level of consciousness: awake and alert  Pain score: 0  Airway patency: patent  Nausea & Vomiting: no vomiting and no nausea  Complications: no  Cardiovascular status: blood pressure returned to baseline and hemodynamically stable  Respiratory status: acceptable, room air, spontaneous ventilation and nonlabored ventilation  Hydration status: stable  Pain management: adequate

## 2023-08-28 NOTE — ANESTHESIA PRE PROCEDURE
Department of Anesthesiology  Preprocedure Note       Name:  Zana Bosch   Age:  68 y.o.  :  1945                                          MRN:  [id number]         Date:  2023      Surgeon: Migdalia Wooten):  Rosa Redmond MD    Procedure: Procedure(s):  COLONOSCOPY DIAGNOSTIC  EGD BIOPSY    Medications prior to admission:   Prior to Admission medications    Medication Sig Start Date End Date Taking?  Authorizing Provider   diphenhydrAMINE (BENADRYL) 25 MG capsule Take 1 capsule by mouth every 6 hours as needed for Itching or Allergies    Historical Provider, MD   Calcium Carb-Cholecalciferol (CALCIUM-VITAMIN D3) 600-10 MG-MCG CAPS Take by mouth    Historical Provider, MD   simvastatin (ZOCOR) 20 MG tablet Take 1 tablet by mouth nightly 23   [facility], APRN - NP   levothyroxine (SYNTHROID) 125 MCG tablet Take 1 tablet by mouth Daily 23   [facility], APRN - NP   carvedilol (COREG) 12.5 MG tablet Take 1 tablet by mouth 2 times daily 7/17/23 10/9/24  [facility], APRN - NP   baclofen (LIORESAL) 10 MG tablet Take 1 tablet by mouth nightly 7/17/23 10/9/24  [facility], APRN - NP   venlafaxine (EFFEXOR XR) 75 MG extended release capsule Take 1 capsule by mouth daily 7/17/23 10/9/24  [facility], APRN - NP   acetaminophen (TYLENOL) 500 MG tablet Take 1 tablet by mouth every 6 hours as needed for Pain Taking BID    Historical Provider, MD   Cholecalciferol (VITAMIN D3) 2000 units CAPS Take 1 capsule by mouth every morning    Historical Provider, MD   Flaxseed, Linseed, (FLAXSEED OIL) 1200 MG CAPS Take 1,200 mg by mouth every morning     Historical Provider, MD   famotidine (PEPCID) 20 MG tablet Take 1 tablet by mouth nightly    Historical Provider, MD   therapeutic multivitamin-minerals (THERAGRAN-M) tablet Take 1 tablet by mouth every morning    Historical Provider, MD   Ascorbic Acid (VITAMIN C) 500 MG tablet Take 2 tablets by mouth every morning    Historical Provider, MD

## 2023-09-02 NOTE — RESULT ENCOUNTER NOTE
Please inform the patient that gastric biopsies are negative for H. pylori. Cecal polyps were all noted to be tubular adenomas and so are the transverse polyps.   Repeat colonoscopy in 3 years

## 2023-09-14 DIAGNOSIS — F33.41 RECURRENT MAJOR DEPRESSIVE DISORDER, IN PARTIAL REMISSION (HCC): Primary | ICD-10-CM

## 2023-09-14 RX ORDER — VENLAFAXINE HYDROCHLORIDE 150 MG/1
150 CAPSULE, EXTENDED RELEASE ORAL DAILY
Qty: 90 CAPSULE | Refills: 3 | Status: SHIPPED | OUTPATIENT
Start: 2023-09-14 | End: 2024-09-08

## 2024-05-08 NOTE — PROGRESS NOTES
following:    Height as of this encounter: 5' 10\" (1.778 m). Weight as of this encounter: 268 lb 3.2 oz (121.7 kg). Physical Exam   Constitutional: She is oriented to person, place, and time. She appears well-developed and well-nourished. HENT:   Head: Normocephalic and atraumatic. Right Ear: External ear normal.   Left Ear: External ear normal.   Nose: Nose normal.   Mouth/Throat: Oropharynx is clear and moist.   Eyes: Pupils are equal, round, and reactive to light. Conjunctivae and EOM are normal.   Neck: Normal range of motion. Neck supple. No JVD present. No tracheal deviation present. No thyromegaly present. Cardiovascular: Normal rate, regular rhythm, normal heart sounds and intact distal pulses. Pulmonary/Chest: Effort normal and breath sounds normal. She has no wheezes. She has no rales. Abdominal: Soft. Bowel sounds are normal. She exhibits no distension and no mass. There is no tenderness. There is no guarding. Musculoskeletal: Normal range of motion. She exhibits no edema. Lymphadenopathy:     She has no cervical adenopathy. Neurological: She is alert and oriented to person, place, and time. She has normal reflexes. Skin: Skin is warm and dry. No rash noted. Psychiatric: She has a normal mood and affect. Her behavior is normal. Judgment and thought content normal.     Coreg/ zocor  Doing well  ASSESSMENT/PLAN:  1. Essential hypertension  Coreg and zocor doing well  - Basic Metabolic Panel; Future    2. Viral hepatitis A without hepatic coma  Recovering well but it took longer than the patient expected  - CBC Auto Differential; Future    3. Chronic bilateral low back pain without sciatica  Discussed yoga, ibuprofen/tylenol    4. Recurrent major depressive disorder, in partial remission ([facility]  effexor helping    5. JUANJOSE positive  retest  - C-Reactive Protein; Future  - CBC Auto Differential; Future  - SEDIMENTATION RATE; Future  - JUANJOSE; Future    6.  Acquired hypothyroidism  retest  - TSH without Reflex; Future    7. History of thyroidectomy, subtotal  retest  - TSH without Reflex; Future    8. Needs flu shot    - INFLUENZA, QUADV, 3 YRS AND OLDER, IM, MDV, 0.5ML ([address] [facility])    9. Need for 23-polyvalent pneumococcal polysaccharide vaccine    - Pneumococcal polysaccharide vaccine 23-valent greater than or equal to 1yo subcutaneous/IM      Return in about 4 months (around 1/18/2019), or if symptoms worsen or fail to improve. An electronic signature was used to authenticate this note.     --Zulema Klein MD on 9/23/2018 at 8:41 PM PAST MEDICAL HISTORY:  No pertinent past medical history

## 2024-08-01 ENCOUNTER — TELEPHONE (OUTPATIENT)
Dept: FAMILY MEDICINE CLINIC | Age: 79
End: 2024-08-01

## 2024-08-01 ASSESSMENT — PATIENT HEALTH QUESTIONNAIRE - PHQ9
1. LITTLE INTEREST OR PLEASURE IN DOING THINGS: SEVERAL DAYS
7. TROUBLE CONCENTRATING ON THINGS, SUCH AS READING THE NEWSPAPER OR WATCHING TELEVISION: NOT AT ALL
8. MOVING OR SPEAKING SO SLOWLY THAT OTHER PEOPLE COULD HAVE NOTICED. OR THE OPPOSITE, BEING SO FIGETY OR RESTLESS THAT YOU HAVE BEEN MOVING AROUND A LOT MORE THAN USUAL: NOT AT ALL
5. POOR APPETITE OR OVEREATING: MORE THAN HALF THE DAYS
3. TROUBLE FALLING OR STAYING ASLEEP: MORE THAN HALF THE DAYS
SUM OF ALL RESPONSES TO PHQ QUESTIONS 1-9: 9
6. FEELING BAD ABOUT YOURSELF - OR THAT YOU ARE A FAILURE OR HAVE LET YOURSELF OR YOUR FAMILY DOWN: SEVERAL DAYS
SUM OF ALL RESPONSES TO PHQ QUESTIONS 1-9: 9
9. THOUGHTS THAT YOU WOULD BE BETTER OFF DEAD, OR OF HURTING YOURSELF: NOT AT ALL
SUM OF ALL RESPONSES TO PHQ9 QUESTIONS 1 & 2: 2
SUM OF ALL RESPONSES TO PHQ QUESTIONS 1-9: 9
10. IF YOU CHECKED OFF ANY PROBLEMS, HOW DIFFICULT HAVE THESE PROBLEMS MADE IT FOR YOU TO DO YOUR WORK, TAKE CARE OF THINGS AT HOME, OR GET ALONG WITH OTHER PEOPLE: NOT DIFFICULT AT ALL
2. FEELING DOWN, DEPRESSED OR HOPELESS: SEVERAL DAYS
SUM OF ALL RESPONSES TO PHQ QUESTIONS 1-9: 9
4. FEELING TIRED OR HAVING LITTLE ENERGY: MORE THAN HALF THE DAYS

## 2024-08-01 NOTE — TELEPHONE ENCOUNTER
----- Message from Rima Portillo Albin sent at 8/1/2024  3:14 PM EDT -----  Regarding: [facility] Appointment Request  [facility] Appointment Request    Patient needs appointment for [facility] Appointment Type: Annual Visit.    Patient Requested Dates(s): Anytime   Patient Requested Time: Morning Appointment  Provider Name:  Margoth Thornton    Reason for Appointment Request: Established Patient - No appointments available during search  --------------------------------------------------------------------------------------------------------------------------    Relationship to Patient: Self     Call Back Information: OK to leave message on voicemail  Preferred Call Back Number: Phone [phone] (home)        Patient would like to book an appointment for her Annual Medicare Wellness visit but there are no appointments found during search. She's also unable to book an appointment with Dr. Thornton through [facility]. Thank you.

## 2024-08-05 SDOH — ECONOMIC STABILITY: FOOD INSECURITY: WITHIN THE PAST 12 MONTHS, YOU WORRIED THAT YOUR FOOD WOULD RUN OUT BEFORE YOU GOT MONEY TO BUY MORE.: NEVER TRUE

## 2024-08-05 SDOH — ECONOMIC STABILITY: INCOME INSECURITY: HOW HARD IS IT FOR YOU TO PAY FOR THE VERY BASICS LIKE FOOD, HOUSING, MEDICAL CARE, AND HEATING?: NOT VERY HARD

## 2024-08-05 SDOH — ECONOMIC STABILITY: FOOD INSECURITY: WITHIN THE PAST 12 MONTHS, THE FOOD YOU BOUGHT JUST DIDN'T LAST AND YOU DIDN'T HAVE MONEY TO GET MORE.: NEVER TRUE

## 2024-08-07 SDOH — HEALTH STABILITY: PHYSICAL HEALTH: ON AVERAGE, HOW MANY DAYS PER WEEK DO YOU ENGAGE IN MODERATE TO STRENUOUS EXERCISE (LIKE A BRISK WALK)?: 2 DAYS

## 2024-08-07 SDOH — HEALTH STABILITY: PHYSICAL HEALTH: ON AVERAGE, HOW MANY MINUTES DO YOU ENGAGE IN EXERCISE AT THIS LEVEL?: 30 MIN

## 2024-08-07 ASSESSMENT — LIFESTYLE VARIABLES
HOW MANY STANDARD DRINKS CONTAINING ALCOHOL DO YOU HAVE ON A TYPICAL DAY: PATIENT DOES NOT DRINK
HOW OFTEN DO YOU HAVE A DRINK CONTAINING ALCOHOL: NEVER

## 2024-08-07 ASSESSMENT — PATIENT HEALTH QUESTIONNAIRE - PHQ9
SUM OF ALL RESPONSES TO PHQ QUESTIONS 1-9: 0
SUM OF ALL RESPONSES TO PHQ QUESTIONS 1-9: 0
SUM OF ALL RESPONSES TO PHQ9 QUESTIONS 1 & 2: 0
SUM OF ALL RESPONSES TO PHQ QUESTIONS 1-9: 0
1. LITTLE INTEREST OR PLEASURE IN DOING THINGS: NOT AT ALL
SUM OF ALL RESPONSES TO PHQ QUESTIONS 1-9: 0
2. FEELING DOWN, DEPRESSED OR HOPELESS: NOT AT ALL

## 2024-08-08 ASSESSMENT — PATIENT HEALTH QUESTIONNAIRE - PHQ9
7. TROUBLE CONCENTRATING ON THINGS, SUCH AS READING THE NEWSPAPER OR WATCHING TELEVISION: NOT AT ALL
1. LITTLE INTEREST OR PLEASURE IN DOING THINGS: SEVERAL DAYS
2. FEELING DOWN, DEPRESSED OR HOPELESS: SEVERAL DAYS
10. IF YOU CHECKED OFF ANY PROBLEMS, HOW DIFFICULT HAVE THESE PROBLEMS MADE IT FOR YOU TO DO YOUR WORK, TAKE CARE OF THINGS AT HOME, OR GET ALONG WITH OTHER PEOPLE: NOT DIFFICULT AT ALL
9. THOUGHTS THAT YOU WOULD BE BETTER OFF DEAD, OR OF HURTING YOURSELF: NOT AT ALL
3. TROUBLE FALLING OR STAYING ASLEEP: MORE THAN HALF THE DAYS
SUM OF ALL RESPONSES TO PHQ QUESTIONS 1-9: 9
4. FEELING TIRED OR HAVING LITTLE ENERGY: MORE THAN HALF THE DAYS
5. POOR APPETITE OR OVEREATING: MORE THAN HALF THE DAYS
6. FEELING BAD ABOUT YOURSELF - OR THAT YOU ARE A FAILURE OR HAVE LET YOURSELF OR YOUR FAMILY DOWN: SEVERAL DAYS
8. MOVING OR SPEAKING SO SLOWLY THAT OTHER PEOPLE COULD HAVE NOTICED. OR THE OPPOSITE - BEING SO FIDGETY OR RESTLESS THAT YOU HAVE BEEN MOVING AROUND A LOT MORE THAN USUAL: NOT AT ALL

## 2024-08-12 SDOH — HEALTH STABILITY: PHYSICAL HEALTH: ON AVERAGE, HOW MANY DAYS PER WEEK DO YOU ENGAGE IN MODERATE TO STRENUOUS EXERCISE (LIKE A BRISK WALK)?: 2 DAYS

## 2024-08-12 SDOH — HEALTH STABILITY: PHYSICAL HEALTH: ON AVERAGE, HOW MANY MINUTES DO YOU ENGAGE IN EXERCISE AT THIS LEVEL?: 30 MIN

## 2024-08-12 ASSESSMENT — PATIENT HEALTH QUESTIONNAIRE - PHQ9
SUM OF ALL RESPONSES TO PHQ QUESTIONS 1-9: 0
SUM OF ALL RESPONSES TO PHQ QUESTIONS 1-9: 0
SUM OF ALL RESPONSES TO PHQ9 QUESTIONS 1 & 2: 0
1. LITTLE INTEREST OR PLEASURE IN DOING THINGS: NOT AT ALL
SUM OF ALL RESPONSES TO PHQ QUESTIONS 1-9: 0
2. FEELING DOWN, DEPRESSED OR HOPELESS: NOT AT ALL
SUM OF ALL RESPONSES TO PHQ QUESTIONS 1-9: 0

## 2024-08-12 ASSESSMENT — LIFESTYLE VARIABLES
HOW MANY STANDARD DRINKS CONTAINING ALCOHOL DO YOU HAVE ON A TYPICAL DAY: PATIENT DOES NOT DRINK
HOW MANY STANDARD DRINKS CONTAINING ALCOHOL DO YOU HAVE ON A TYPICAL DAY: 0
HOW OFTEN DO YOU HAVE A DRINK CONTAINING ALCOHOL: 1
HOW OFTEN DO YOU HAVE A DRINK CONTAINING ALCOHOL: NEVER
HOW OFTEN DO YOU HAVE SIX OR MORE DRINKS ON ONE OCCASION: 1

## 2024-08-15 ENCOUNTER — OFFICE VISIT (OUTPATIENT)
Dept: FAMILY MEDICINE CLINIC | Age: 79
End: 2024-08-15

## 2024-08-15 VITALS
TEMPERATURE: 96.5 F | SYSTOLIC BLOOD PRESSURE: 144 MMHG | WEIGHT: 268 LBS | OXYGEN SATURATION: 98 % | RESPIRATION RATE: 17 BRPM | HEART RATE: 65 BPM | DIASTOLIC BLOOD PRESSURE: 78 MMHG | BODY MASS INDEX: 38.37 KG/M2 | HEIGHT: 70 IN

## 2024-08-15 DIAGNOSIS — M35.05 SJOGREN'S SYNDROME WITH INFLAMMATORY ARTHRITIS (HCC): ICD-10-CM

## 2024-08-15 DIAGNOSIS — K21.9 GASTROESOPHAGEAL REFLUX DISEASE, UNSPECIFIED WHETHER ESOPHAGITIS PRESENT: ICD-10-CM

## 2024-08-15 DIAGNOSIS — R76.8 ANA POSITIVE: ICD-10-CM

## 2024-08-15 DIAGNOSIS — E03.9 ACQUIRED HYPOTHYROIDISM: ICD-10-CM

## 2024-08-15 DIAGNOSIS — Z00.00 MEDICARE ANNUAL WELLNESS VISIT, SUBSEQUENT: ICD-10-CM

## 2024-08-15 DIAGNOSIS — I10 ESSENTIAL HYPERTENSION: ICD-10-CM

## 2024-08-15 DIAGNOSIS — Z76.89 ENCOUNTER TO ESTABLISH CARE WITH NEW DOCTOR: Primary | ICD-10-CM

## 2024-08-15 DIAGNOSIS — E78.2 MIXED HYPERLIPIDEMIA: ICD-10-CM

## 2024-08-15 DIAGNOSIS — L81.9 PIGMENTED SKIN LESION SUSPICIOUS FOR MALIGNANT NEOPLASM: ICD-10-CM

## 2024-08-15 DIAGNOSIS — Z12.31 ENCOUNTER FOR SCREENING MAMMOGRAM FOR MALIGNANT NEOPLASM OF BREAST: ICD-10-CM

## 2024-08-15 LAB
Lab: NORMAL
QC PASS/FAIL: NORMAL
SARS-COV-2 RDRP RESP QL NAA+PROBE: NEGATIVE

## 2024-08-15 SDOH — ECONOMIC STABILITY: FOOD INSECURITY: WITHIN THE PAST 12 MONTHS, THE FOOD YOU BOUGHT JUST DIDN'T LAST AND YOU DIDN'T HAVE MONEY TO GET MORE.: NEVER TRUE

## 2024-08-15 SDOH — ECONOMIC STABILITY: INCOME INSECURITY: HOW HARD IS IT FOR YOU TO PAY FOR THE VERY BASICS LIKE FOOD, HOUSING, MEDICAL CARE, AND HEATING?: NOT HARD AT ALL

## 2024-08-15 SDOH — ECONOMIC STABILITY: FOOD INSECURITY: WITHIN THE PAST 12 MONTHS, YOU WORRIED THAT YOUR FOOD WOULD RUN OUT BEFORE YOU GOT MONEY TO BUY MORE.: NEVER TRUE

## 2024-08-15 NOTE — PATIENT INSTRUCTIONS
Mammogram: Call to schedule - [phone].      Learning About Mindfulness for Stress  What are mindfulness and stress?     Stress is your body's response to a hard situation. Your body can have a physical, emotional, or mental response. A lot of things can cause stress. You may feel stress when you go on a job interview, take a test, or run a race. This kind of short-term stress is normal and even useful. It can help you if you need to work hard or react quickly.  Stress also can last a long time. Long-term stress is caused by stressful situations or events. Examples of long-term stress include long-term health problems, ongoing problems at work, and conflicts in your family. Long-term stress can harm your health.  Mindfulness is a focus only on things happening in the present moment. It's a process of purposefully paying attention to and being aware of your surroundings, your emotions, your thoughts, and how your body feels. You are aware of these things, but you aren't judging these experiences as \"good\" or \"bad.\" Mindfulness can help you learn to calm your mind and body to help you cope with illness, pain, and stress.  How does mindfulness help to relieve stress?  Mindfulness can help quiet your mind and relax your body. Studies show that it can help some people sleep better, feel less anxious, and bring their blood pressure down. And it's been shown to help some people live and cope better with certain health problems like heart disease, depression, chronic pain, and cancer.  How do you practice mindfulness?  To be mindful is to pay attention, to be present, and to be accepting. Like any new skill or habit, being mindful can take practice.  When you're mindful, you do just one thing and you pay close attention to that one thing. For example, you may sit quietly and notice your emotions or how your food tastes and smells.  When you're present, you focus on the things that are happening right now. You let go

## 2024-08-15 NOTE — PROGRESS NOTES
Medicare Annual Wellness Visit    Shadia Boswell is here for Cough (Productive from sinus; yellow-green), Congestion (Head congestion), and Generalized Body Aches (Could be due to auto immune)    Assessment & Plan   Medicare annual wellness visit, subsequent  JUANJOSE positive  -     Jose Francisco Garduno MD, Rheumatology, [place]  Sjogren's syndrome with inflammatory arthritis (HCC)  -     Jose Francisco Garduno MD, Rheumatology, [place]  Essential hypertension  -     CBC with Auto Differential; Future  -     Comprehensive Metabolic Panel; Future  -     Lipid, Fasting; Future  Mixed hyperlipidemia  Acquired hypothyroidism  -     TSH with Reflex; Future  Gastroesophageal reflux disease, unspecified whether esophagitis present  Encounter for screening mammogram for malignant neoplasm of breast  -     MARIANA DIGITAL SCREEN W CAD BILATERAL PER PROTOCOL; Future    Recommendations for Preventive Services Due: see orders and patient instructions/AVS.  Recommended screening schedule for the next 5-10 years is provided to the patient in written form: see Patient Instructions/AVS.     No follow-ups on file.     Subjective   Pt also here for new to provider visit and evaluation of cough    Patient's complete Health Risk Assessment and screening values have been reviewed and are found in Flowsheets. The following problems were reviewed today and where indicated follow up appointments were made and/or referrals ordered.    Positive Risk Factor Screenings with Interventions:                Inactivity:  On average, how many days per week do you engage in moderate to strenuous exercise (like a brisk walk)?: 2 days (!) Abnormal  On average, how many minutes do you engage in exercise at this level?: 30 min  Interventions:  Patient comments: tries to be active but hurts her back     Abnormal BMI (obese):  Body mass index is 38.45 kg/m². (!) Abnormal  Interventions:  Patient declines any further evaluation or treatment           
Future    5. Mixed hyperlipidemia      6. Acquired hypothyroidism    - TSH with Reflex; Future    7. Gastroesophageal reflux disease, unspecified whether esophagitis present      8. Encounter for screening mammogram for malignant neoplasm of breast    - MARIANA DIGITAL SCREEN W CAD BILATERAL PER PROTOCOL; Future    9. Medicare annual wellness visit, subsequent    - POCT COVID-19 Rapid, NAAT    10. Pigmented skin lesion suspicious for malignant neoplasm    - KIRAN - Wili Byrd MD, Dermatology, [place]    Discussed continueing mammogram, would lie to continue, labs today, follow for results    No follow-ups on file.         Electronically signed by ERIK Ratliff on 8/15/2024      Comment: Please note this report has been produced using speech recognition software and may contain errors related to that system including errors in grammar, punctuation, and spelling, as well as words and phrases that may be inappropriate. If there are any questions or concerns please feel free to contact the dictating provider for clarification.

## 2024-08-16 LAB
ALBUMIN SERPL-MCNC: 4.3 G/DL (ref 3.4–5)
ALBUMIN/GLOB SERPL: 1.7 {RATIO} (ref 1.1–2.2)
ALP SERPL-CCNC: 118 U/L (ref 40–129)
ALT SERPL-CCNC: 48 U/L (ref 10–40)
ANION GAP SERPL CALCULATED.3IONS-SCNC: 12 MMOL/L (ref 3–16)
AST SERPL-CCNC: 59 U/L (ref 15–37)
BASOPHILS # BLD: 0 K/UL (ref 0–0.2)
BASOPHILS NFR BLD: 0.4 %
BILIRUB SERPL-MCNC: 0.6 MG/DL (ref 0–1)
BUN SERPL-MCNC: 11 MG/DL (ref 7–20)
CALCIUM SERPL-MCNC: 9.8 MG/DL (ref 8.3–10.6)
CHLORIDE SERPL-SCNC: 99 MMOL/L (ref 99–110)
CHOLEST SERPL-MCNC: 125 MG/DL (ref 0–199)
CO2 SERPL-SCNC: 29 MMOL/L (ref 21–32)
CREAT SERPL-MCNC: 0.8 MG/DL (ref 0.6–1.2)
DEPRECATED RDW RBC AUTO: 13.6 % (ref 12.4–15.4)
EOSINOPHIL # BLD: 0 K/UL (ref 0–0.6)
EOSINOPHIL NFR BLD: 0.1 %
GFR SERPLBLD CREATININE-BSD FMLA CKD-EPI: 75 ML/MIN/{1.73_M2}
GLUCOSE SERPL-MCNC: 141 MG/DL (ref 70–99)
HCT VFR BLD AUTO: 42.7 % (ref 36–48)
HDLC SERPL-MCNC: 43 MG/DL (ref 40–60)
HGB BLD-MCNC: 14.9 G/DL (ref 12–16)
LDL CHOLESTEROL: 46 MG/DL
LYMPHOCYTES # BLD: 1.5 K/UL (ref 1–5.1)
LYMPHOCYTES NFR BLD: 25.5 %
MCH RBC QN AUTO: 33.5 PG (ref 26–34)
MCHC RBC AUTO-ENTMCNC: 34.9 G/DL (ref 31–36)
MCV RBC AUTO: 95.9 FL (ref 80–100)
MONOCYTES # BLD: 0.9 K/UL (ref 0–1.3)
MONOCYTES NFR BLD: 14.7 %
NEUTROPHILS # BLD: 3.5 K/UL (ref 1.7–7.7)
NEUTROPHILS NFR BLD: 59.3 %
PLATELET # BLD AUTO: 256 K/UL (ref 135–450)
PMV BLD AUTO: 8.4 FL (ref 5–10.5)
POTASSIUM SERPL-SCNC: 4.7 MMOL/L (ref 3.5–5.1)
PROT SERPL-MCNC: 6.8 G/DL (ref 6.4–8.2)
RBC # BLD AUTO: 4.45 M/UL (ref 4–5.2)
SODIUM SERPL-SCNC: 140 MMOL/L (ref 136–145)
T4 FREE SERPL-MCNC: 1.5 NG/DL (ref 0.9–1.8)
TRIGL SERPL-MCNC: 178 MG/DL (ref 0–150)
TSH SERPL DL<=0.005 MIU/L-ACNC: 4.57 UIU/ML (ref 0.27–4.2)
VLDLC SERPL CALC-MCNC: 36 MG/DL
WBC # BLD AUTO: 5.9 K/UL (ref 4–11)

## 2024-08-20 ENCOUNTER — HOSPITAL ENCOUNTER (OUTPATIENT)
Age: 79
Discharge: HOME | End: 2024-08-20
Payer: COMMERCIAL

## 2024-08-20 ENCOUNTER — PATIENT MESSAGE (OUTPATIENT)
Dept: FAMILY MEDICINE CLINIC | Age: 79
End: 2024-08-20

## 2024-08-20 DIAGNOSIS — R73.9 HYPERGLYCEMIA: ICD-10-CM

## 2024-08-20 DIAGNOSIS — R73.9 HYPERGLYCEMIA: Primary | ICD-10-CM

## 2024-08-20 DIAGNOSIS — R74.01 TRANSAMINITIS: ICD-10-CM

## 2024-08-20 LAB
ESTIMATED AVERAGE GLUCOSE: 131 MG/DL
HBA1C MFR BLD: 6.2 % (ref 4.2–6.3)

## 2024-08-20 PROCEDURE — 83036 HEMOGLOBIN GLYCOSYLATED A1C: CPT

## 2024-08-20 PROCEDURE — 36415 COLL VENOUS BLD VENIPUNCTURE: CPT

## 2024-08-26 ENCOUNTER — PATIENT MESSAGE (OUTPATIENT)
Dept: FAMILY MEDICINE CLINIC | Age: 79
End: 2024-08-26

## 2024-08-26 DIAGNOSIS — R76.8 ANA POSITIVE: Primary | ICD-10-CM

## 2024-08-26 DIAGNOSIS — L81.9 PIGMENTED SKIN LESION SUSPICIOUS FOR MALIGNANT NEOPLASM: ICD-10-CM

## 2024-08-26 DIAGNOSIS — M35.05 SJOGREN'S SYNDROME WITH INFLAMMATORY ARTHRITIS (HCC): ICD-10-CM

## 2024-08-28 ENCOUNTER — HOSPITAL ENCOUNTER (OUTPATIENT)
Dept: ULTRASOUND IMAGING | Age: 79
Discharge: HOME | End: 2024-08-28
Payer: COMMERCIAL

## 2024-08-28 ENCOUNTER — HOSPITAL ENCOUNTER (OUTPATIENT)
Dept: MAMMOGRAPHY | Age: 79
Discharge: HOME | End: 2024-08-28
Payer: COMMERCIAL

## 2024-08-28 DIAGNOSIS — Z12.31 ENCOUNTER FOR SCREENING MAMMOGRAM FOR MALIGNANT NEOPLASM OF BREAST: ICD-10-CM

## 2024-08-28 DIAGNOSIS — R74.01 TRANSAMINITIS: ICD-10-CM

## 2024-08-28 PROCEDURE — 77063 BREAST TOMOSYNTHESIS BI: CPT

## 2024-08-28 PROCEDURE — 76705 ECHO EXAM OF ABDOMEN: CPT

## 2024-09-02 DIAGNOSIS — R92.8 ABNORMAL MAMMOGRAM OF RIGHT BREAST: Primary | ICD-10-CM

## 2024-09-03 ENCOUNTER — HOSPITAL ENCOUNTER (OUTPATIENT)
Dept: WOMENS IMAGING | Age: 79
Discharge: HOME | End: 2024-09-03
Payer: COMMERCIAL

## 2024-09-03 ENCOUNTER — TELEPHONE (OUTPATIENT)
Dept: FAMILY MEDICINE CLINIC | Age: 79
End: 2024-09-03

## 2024-09-03 ENCOUNTER — HOSPITAL ENCOUNTER (OUTPATIENT)
Dept: ULTRASOUND IMAGING | Age: 79
Discharge: HOME | End: 2024-09-03
Payer: COMMERCIAL

## 2024-09-03 DIAGNOSIS — R92.8 ABNORMAL MAMMOGRAM OF RIGHT BREAST: ICD-10-CM

## 2024-09-03 PROCEDURE — 76642 ULTRASOUND BREAST LIMITED: CPT

## 2024-09-03 PROCEDURE — G0279 TOMOSYNTHESIS, MAMMO: HCPCS

## 2024-09-03 NOTE — TELEPHONE ENCOUNTER
Spoke with pt and she advised that her TSH is elevated . Pt reports that this happens from time to time and she only takes 1/2 dose of her synthroid and checks lab in 1 month. Pt wanted to let Miguelito know that this is what she is doing

## 2024-09-05 DIAGNOSIS — E03.9 ACQUIRED HYPOTHYROIDISM: ICD-10-CM

## 2024-09-06 DIAGNOSIS — M54.50 CHRONIC BILATERAL LOW BACK PAIN WITHOUT SCIATICA: ICD-10-CM

## 2024-09-06 DIAGNOSIS — I10 ESSENTIAL HYPERTENSION: ICD-10-CM

## 2024-09-06 DIAGNOSIS — G89.29 CHRONIC BILATERAL LOW BACK PAIN WITHOUT SCIATICA: ICD-10-CM

## 2024-09-06 DIAGNOSIS — E78.2 MIXED HYPERLIPIDEMIA: ICD-10-CM

## 2024-09-06 RX ORDER — CARVEDILOL 12.5 MG/1
12.5 TABLET ORAL 2 TIMES DAILY
Qty: 180 TABLET | Refills: 4 | Status: SHIPPED | OUTPATIENT
Start: 2024-09-06 | End: 2025-11-30

## 2024-09-06 RX ORDER — BACLOFEN 10 MG/1
10 TABLET ORAL
Qty: 90 TABLET | Refills: 4 | Status: SHIPPED | OUTPATIENT
Start: 2024-09-06 | End: 2025-11-30

## 2024-09-06 RX ORDER — SIMVASTATIN 20 MG
20 TABLET ORAL NIGHTLY
Qty: 90 TABLET | Refills: 4 | Status: SHIPPED | OUTPATIENT
Start: 2024-09-06

## 2024-09-06 RX ORDER — LEVOTHYROXINE SODIUM 125 UG/1
125 TABLET ORAL DAILY
Qty: 90 TABLET | Refills: 4 | Status: SHIPPED | OUTPATIENT
Start: 2024-09-06

## 2024-12-11 DIAGNOSIS — F33.41 RECURRENT MAJOR DEPRESSIVE DISORDER, IN PARTIAL REMISSION (HCC): ICD-10-CM

## 2024-12-12 RX ORDER — VENLAFAXINE HYDROCHLORIDE 150 MG/1
150 CAPSULE, EXTENDED RELEASE ORAL DAILY
Qty: 90 CAPSULE | Refills: 3 | OUTPATIENT
Start: 2024-12-12 | End: 2025-12-07

## 2024-12-13 DIAGNOSIS — F33.41 RECURRENT MAJOR DEPRESSIVE DISORDER, IN PARTIAL REMISSION (HCC): ICD-10-CM

## 2024-12-13 RX ORDER — VENLAFAXINE HYDROCHLORIDE 150 MG/1
150 CAPSULE, EXTENDED RELEASE ORAL DAILY
Qty: 90 CAPSULE | Refills: 3 | Status: SHIPPED | OUTPATIENT
Start: 2024-12-13 | End: 2025-12-08

## 2025-05-17 NOTE — PROGRESS NOTES
SKELETON; Future       Patient Instructions  Complete ordered labs and imaging done  We will discuss results at next visit  RTC in 6 weeks      -  The patient indicates understanding of these issues and agrees with the plan.    I spent  60  minutes on the date of service, preparing to see the patient (eg, review of tests), obtaining and/or reviewing separately obtained history, counseling, ordering medications, tests, or procedures, documenting clinical information in the electronic or other health record and in care coordination.This note was dictated with voice recognition software        Jose Francisco Anderson MD

## 2025-05-18 ASSESSMENT — RHEUMATOLOGY NEW PATIENT QUESTIONNAIRE
BEHAVIORAL CHANGES: N
ANXIETY: N
FAINTING: N
RASH: Y
COUGHING OF BLOOD: N
HOW WOULD YOU DESCRIBE YOUR STIFFNESS ON AVERAGE: MODERATE
UNUSUAL BLEEDING: N
STOMACH PAIN: N
SWOLLEN OR TENDER GLANDS: Y
DEPRESSION: Y
SKIN REDNESS: Y
DIFFICULTY STAYING ASLEEP: Y
VAGINAL DRYNESS: Y
DOUBLE OR BLURRED VISION: N
NUMBNESS OR TINGLING IN HANDS OR FEET: Y
JOINT SWELLING: N
ABNORMAL URINE: N
LOSS OF VISION: N
MORNING STIFFNESS: Y
BLOOD IN STOOLS: N
FEVER: N
DIFFICULTY SWALLOWING: Y
HEADACHES: Y
SEIZURES: N
BLACK STOOLS: N
SWOLLEN LEGS OR FEET: N
VOMITING OF BLOOD OR COFFEE GROUND CONSISTENCY MATERIAL: N
JAUNDICE: N
CHEST PAIN: N
COUGH: Y
ANEMIA: N
PAIN OR BURNING ON URINATION: Y
MORNING STIFFNESS IN LOWER BACK: N
DIFFICULTY FALLING ASLEEP: Y
NODULES/BUMPS: Y
SKIN TIGHTNESS: Y
DRYNESS OF MOUTH: Y
SUN SENSITIVE (SUN ALLERGY): Y
WHEEZING: N
EYE DRYNESS: Y
INCREASED SUSCEPTIBILITY TO INFECTION: N
EASILY LOSING TEMPER: N
EYE PAIN: N
MUSCLE WEAKNESS: Y
HOARSE VOICE: Y
AGITATION: N
MEMORY LOSS: N
UNUSUAL FATIGUE: Y
EASY BRUISING: Y
DIFFICULTY BREATHING LYING DOWN: N
EYE REDNESS: Y
RASH OR ULCERS: N
UNEXPLAINED WEIGHT CHANGE: N
HEARTBURN OR REFLUX: Y
SHORTNESS OF BREATH: Y
EXCESSIVE HAIR LOSS (MORE THAN YOUR NORM): N
NIGHT SWEATS: Y
UNEXPLAINED HEARING LOSS: N
UNUSUALLY RAPID OR SLOWED HEART RATE: N
LOSS OF CONSCIOUSNESS: N
SORES IN MOUTH OR NOSE: N
COLOR CHANGES OF HANDS OR FEET IN THE COLD: Y
NAUSEA: Y
JOINT PAIN: Y

## 2025-05-20 ENCOUNTER — OFFICE VISIT (OUTPATIENT)
Age: 80
End: 2025-05-20
Payer: COMMERCIAL

## 2025-05-20 VITALS
SYSTOLIC BLOOD PRESSURE: 124 MMHG | BODY MASS INDEX: 38.02 KG/M2 | WEIGHT: 265 LBS | OXYGEN SATURATION: 97 % | DIASTOLIC BLOOD PRESSURE: 82 MMHG | HEART RATE: 65 BPM

## 2025-05-20 DIAGNOSIS — Z01.89 ENCOUNTER FOR OTHER SPECIFIED SPECIAL EXAMINATIONS: ICD-10-CM

## 2025-05-20 DIAGNOSIS — R53.82 CHRONIC FATIGUE: ICD-10-CM

## 2025-05-20 DIAGNOSIS — M85.89 OTHER SPECIFIED DISORDERS OF BONE DENSITY AND STRUCTURE, MULTIPLE SITES: ICD-10-CM

## 2025-05-20 DIAGNOSIS — M89.9 DISORDER OF BONE, UNSPECIFIED: ICD-10-CM

## 2025-05-20 DIAGNOSIS — M35.00 SICCA, UNSPECIFIED TYPE: ICD-10-CM

## 2025-05-20 DIAGNOSIS — M25.50 POLYARTHRALGIA: Primary | ICD-10-CM

## 2025-05-20 PROCEDURE — 1159F MED LIST DOCD IN RCRD: CPT | Performed by: STUDENT IN AN ORGANIZED HEALTH CARE EDUCATION/TRAINING PROGRAM

## 2025-05-20 PROCEDURE — 1123F ACP DISCUSS/DSCN MKR DOCD: CPT | Performed by: STUDENT IN AN ORGANIZED HEALTH CARE EDUCATION/TRAINING PROGRAM

## 2025-05-20 PROCEDURE — 3074F SYST BP LT 130 MM HG: CPT | Performed by: STUDENT IN AN ORGANIZED HEALTH CARE EDUCATION/TRAINING PROGRAM

## 2025-05-20 PROCEDURE — 3079F DIAST BP 80-89 MM HG: CPT | Performed by: STUDENT IN AN ORGANIZED HEALTH CARE EDUCATION/TRAINING PROGRAM

## 2025-05-20 PROCEDURE — 99205 OFFICE O/P NEW HI 60 MIN: CPT | Performed by: STUDENT IN AN ORGANIZED HEALTH CARE EDUCATION/TRAINING PROGRAM

## 2025-05-20 NOTE — PATIENT INSTRUCTIONS
We are committed to providing you the best care possible.    If you receive a survey after visiting one of our offices, please take time to share your experience concerning your physician office visit.  These surveys are confidential and no health information about you is shared.    We are eager to improve for you and we are counting on your feedback to help make that happen.    Patient Instructions  Complete ordered labs and get xray hand imaging done  We will discuss results at next visit  We will update you when your results report.  Schedule for DEXA scan  RTC in 6 weeks

## 2025-05-23 ENCOUNTER — HOSPITAL ENCOUNTER (OUTPATIENT)
Age: 80
Discharge: HOME | End: 2025-05-23
Payer: COMMERCIAL

## 2025-05-23 ENCOUNTER — HOSPITAL ENCOUNTER (OUTPATIENT)
Dept: WOMENS IMAGING | Age: 80
Discharge: HOME | End: 2025-05-23
Payer: COMMERCIAL

## 2025-05-23 ENCOUNTER — HOSPITAL ENCOUNTER (OUTPATIENT)
Dept: GENERAL RADIOLOGY | Age: 80
Discharge: HOME | End: 2025-05-23
Payer: COMMERCIAL

## 2025-05-23 DIAGNOSIS — M25.50 POLYARTHRALGIA: ICD-10-CM

## 2025-05-23 DIAGNOSIS — M85.89 OTHER SPECIFIED DISORDERS OF BONE DENSITY AND STRUCTURE, MULTIPLE SITES: ICD-10-CM

## 2025-05-23 DIAGNOSIS — M35.00 SICCA, UNSPECIFIED TYPE: ICD-10-CM

## 2025-05-23 DIAGNOSIS — R53.82 CHRONIC FATIGUE: ICD-10-CM

## 2025-05-23 DIAGNOSIS — M89.9 DISORDER OF BONE, UNSPECIFIED: ICD-10-CM

## 2025-05-23 DIAGNOSIS — Z01.89 ENCOUNTER FOR OTHER SPECIFIED SPECIAL EXAMINATIONS: ICD-10-CM

## 2025-05-23 LAB
25(OH)D3 SERPL-MCNC: 42.1 NG/ML (ref 30–150)
ALBUMIN SERPL-MCNC: 4.2 G/DL (ref 3.4–5)
ALBUMIN/GLOB SERPL: 1.5 {RATIO} (ref 1.1–2.2)
ALBUMIN: 4.2 G/DL (ref 3.4–5)
ALP SERPL-CCNC: 105 U/L (ref 40–129)
ALT SERPL-CCNC: 32 U/L (ref 10–40)
ANION GAP SERPL CALCULATED.3IONS-SCNC: 11 MMOL/L (ref 9–17)
AST SERPL-CCNC: 38 U/L (ref 15–37)
BILIRUB DIRECT SERPL-MCNC: 0.3 MG/DL (ref 0–0.3)
BILIRUB INDIRECT SERPL-MCNC: 0.3 MG/DL (ref 0–0.7)
BILIRUB SERPL-MCNC: 0.6 MG/DL (ref 0–1)
BUN SERPL-MCNC: 9 MG/DL (ref 7–20)
CALCIUM SERPL-MCNC: 9.6 MG/DL (ref 8.3–10.6)
CHLORIDE SERPL-SCNC: 99 MMOL/L (ref 99–110)
CO2 SERPL-SCNC: 27 MMOL/L (ref 21–32)
CREAT SERPL-MCNC: 0.8 MG/DL (ref 0.6–1.2)
CRP SERPL HS-MCNC: 7.8 MG/L (ref 0–5)
ERYTHROCYTE [DISTWIDTH] IN BLOOD BY AUTOMATED COUNT: 13.1 % (ref 11.7–14.9)
ERYTHROCYTE [SEDIMENTATION RATE] IN BLOOD BY WESTERGREN METHOD: 18 MM/HR (ref 0–30)
GFR, ESTIMATED: 70 ML/MIN/1.73M2
GLUCOSE SERPL-MCNC: 144 MG/DL (ref 74–99)
HAV IGM SERPL QL IA: NONREACTIVE
HBV CORE IGM SERPL QL IA: NONREACTIVE
HBV SURFACE AG SERPL QL IA: NONREACTIVE
HCT VFR BLD AUTO: 44.9 % (ref 37–47)
HCV AB SERPL QL IA: NONREACTIVE
HGB BLD-MCNC: 14.7 G/DL (ref 12.5–16)
MCH RBC QN AUTO: 32.7 PG (ref 27–31)
MCHC RBC AUTO-ENTMCNC: 32.7 G/DL (ref 32–36)
MCV RBC AUTO: 99.8 FL (ref 78–100)
PHOSPHATE SERPL-MCNC: 2.8 MG/DL (ref 2.5–4.9)
PLATELET # BLD AUTO: 263 K/UL (ref 140–440)
PMV BLD AUTO: 10.3 FL (ref 7.5–11.1)
POTASSIUM SERPL-SCNC: 4.3 MMOL/L (ref 3.5–5.1)
PROT SERPL-MCNC: 6.9 G/DL (ref 6.4–8.2)
RBC # BLD AUTO: 4.5 M/UL (ref 4.2–5.4)
SODIUM SERPL-SCNC: 137 MMOL/L (ref 136–145)
URATE SERPL-MCNC: 5.3 MG/DL (ref 2.6–6)
WBC OTHER # BLD: 7.3 K/UL (ref 4–10.5)

## 2025-05-23 PROCEDURE — 82306 VITAMIN D 25 HYDROXY: CPT

## 2025-05-23 PROCEDURE — 84550 ASSAY OF BLOOD/URIC ACID: CPT

## 2025-05-23 PROCEDURE — 85027 COMPLETE CBC AUTOMATED: CPT

## 2025-05-23 PROCEDURE — 73130 X-RAY EXAM OF HAND: CPT

## 2025-05-23 PROCEDURE — 80076 HEPATIC FUNCTION PANEL: CPT

## 2025-05-23 PROCEDURE — 86431 RHEUMATOID FACTOR QUANT: CPT

## 2025-05-23 PROCEDURE — 86140 C-REACTIVE PROTEIN: CPT

## 2025-05-23 PROCEDURE — 85652 RBC SED RATE AUTOMATED: CPT

## 2025-05-23 PROCEDURE — 80074 ACUTE HEPATITIS PANEL: CPT

## 2025-05-23 PROCEDURE — 77080 DXA BONE DENSITY AXIAL: CPT

## 2025-05-23 PROCEDURE — 86200 CCP ANTIBODY: CPT

## 2025-05-23 PROCEDURE — 86480 TB TEST CELL IMMUN MEASURE: CPT

## 2025-05-23 PROCEDURE — 36415 COLL VENOUS BLD VENIPUNCTURE: CPT

## 2025-05-23 PROCEDURE — 80069 RENAL FUNCTION PANEL: CPT

## 2025-05-26 LAB — CYCLIC CITRULLIN PEPTIDE AB: 3 UNITS (ref 0–19)

## 2025-05-27 LAB — RHEUMATOID FACTOR: <10 IU/ML

## 2025-05-29 LAB
QUANTI TB GOLD PLUS: NEGATIVE
QUANTI TB1 MINUS NIL: 0 IU/ML
QUANTI TB2 MINUS NIL: 0 IU/ML
QUANTIFERON MITOGEN: 9.96 IU/ML
QUANTIFERON NIL: 0.04 IU/ML

## 2025-06-02 SDOH — ECONOMIC STABILITY: INCOME INSECURITY: IN THE LAST 12 MONTHS, WAS THERE A TIME WHEN YOU WERE NOT ABLE TO PAY THE MORTGAGE OR RENT ON TIME?: NO

## 2025-06-02 SDOH — HEALTH STABILITY: PHYSICAL HEALTH: ON AVERAGE, HOW MANY MINUTES DO YOU ENGAGE IN EXERCISE AT THIS LEVEL?: 20 MIN

## 2025-06-02 SDOH — ECONOMIC STABILITY: FOOD INSECURITY: WITHIN THE PAST 12 MONTHS, THE FOOD YOU BOUGHT JUST DIDN'T LAST AND YOU DIDN'T HAVE MONEY TO GET MORE.: NEVER TRUE

## 2025-06-02 SDOH — ECONOMIC STABILITY: FOOD INSECURITY: WITHIN THE PAST 12 MONTHS, YOU WORRIED THAT YOUR FOOD WOULD RUN OUT BEFORE YOU GOT MONEY TO BUY MORE.: NEVER TRUE

## 2025-06-02 SDOH — HEALTH STABILITY: PHYSICAL HEALTH: ON AVERAGE, HOW MANY DAYS PER WEEK DO YOU ENGAGE IN MODERATE TO STRENUOUS EXERCISE (LIKE A BRISK WALK)?: 3 DAYS

## 2025-06-02 SDOH — ECONOMIC STABILITY: TRANSPORTATION INSECURITY
IN THE PAST 12 MONTHS, HAS THE LACK OF TRANSPORTATION KEPT YOU FROM MEDICAL APPOINTMENTS OR FROM GETTING MEDICATIONS?: NO

## 2025-06-02 ASSESSMENT — PATIENT HEALTH QUESTIONNAIRE - PHQ9
1. LITTLE INTEREST OR PLEASURE IN DOING THINGS: SEVERAL DAYS
SUM OF ALL RESPONSES TO PHQ QUESTIONS 1-9: 1
2. FEELING DOWN, DEPRESSED OR HOPELESS: NOT AT ALL
SUM OF ALL RESPONSES TO PHQ QUESTIONS 1-9: 1

## 2025-06-02 ASSESSMENT — LIFESTYLE VARIABLES
HOW MANY STANDARD DRINKS CONTAINING ALCOHOL DO YOU HAVE ON A TYPICAL DAY: PATIENT DOES NOT DRINK
HOW OFTEN DO YOU HAVE A DRINK CONTAINING ALCOHOL: 1
HOW MANY STANDARD DRINKS CONTAINING ALCOHOL DO YOU HAVE ON A TYPICAL DAY: 0
HOW OFTEN DO YOU HAVE SIX OR MORE DRINKS ON ONE OCCASION: 1
HOW OFTEN DO YOU HAVE A DRINK CONTAINING ALCOHOL: NEVER

## 2025-06-03 ENCOUNTER — OFFICE VISIT (OUTPATIENT)
Dept: FAMILY MEDICINE CLINIC | Age: 80
End: 2025-06-03
Payer: COMMERCIAL

## 2025-06-03 VITALS
SYSTOLIC BLOOD PRESSURE: 138 MMHG | HEIGHT: 70 IN | WEIGHT: 266.2 LBS | BODY MASS INDEX: 38.11 KG/M2 | HEART RATE: 74 BPM | DIASTOLIC BLOOD PRESSURE: 88 MMHG | OXYGEN SATURATION: 98 % | RESPIRATION RATE: 16 BRPM

## 2025-06-03 DIAGNOSIS — M54.50 CHRONIC BILATERAL LOW BACK PAIN WITHOUT SCIATICA: ICD-10-CM

## 2025-06-03 DIAGNOSIS — I10 ESSENTIAL HYPERTENSION: ICD-10-CM

## 2025-06-03 DIAGNOSIS — R73.03 PREDIABETES: ICD-10-CM

## 2025-06-03 DIAGNOSIS — E03.9 ACQUIRED HYPOTHYROIDISM: ICD-10-CM

## 2025-06-03 DIAGNOSIS — F33.41 RECURRENT MAJOR DEPRESSIVE DISORDER, IN PARTIAL REMISSION: ICD-10-CM

## 2025-06-03 DIAGNOSIS — G89.29 CHRONIC BILATERAL LOW BACK PAIN WITHOUT SCIATICA: ICD-10-CM

## 2025-06-03 DIAGNOSIS — E78.2 MIXED HYPERLIPIDEMIA: ICD-10-CM

## 2025-06-03 DIAGNOSIS — Z00.00 MEDICARE ANNUAL WELLNESS VISIT, SUBSEQUENT: Primary | ICD-10-CM

## 2025-06-03 PROCEDURE — G0439 PPPS, SUBSEQ VISIT: HCPCS | Performed by: PHYSICIAN ASSISTANT

## 2025-06-03 PROCEDURE — 3079F DIAST BP 80-89 MM HG: CPT | Performed by: PHYSICIAN ASSISTANT

## 2025-06-03 PROCEDURE — 1123F ACP DISCUSS/DSCN MKR DOCD: CPT | Performed by: PHYSICIAN ASSISTANT

## 2025-06-03 PROCEDURE — 36415 COLL VENOUS BLD VENIPUNCTURE: CPT | Performed by: PHYSICIAN ASSISTANT

## 2025-06-03 PROCEDURE — 1159F MED LIST DOCD IN RCRD: CPT | Performed by: PHYSICIAN ASSISTANT

## 2025-06-03 PROCEDURE — 3075F SYST BP GE 130 - 139MM HG: CPT | Performed by: PHYSICIAN ASSISTANT

## 2025-06-03 PROCEDURE — 99214 OFFICE O/P EST MOD 30 MIN: CPT | Performed by: PHYSICIAN ASSISTANT

## 2025-06-03 RX ORDER — BACLOFEN 10 MG/1
10 TABLET ORAL
Qty: 90 TABLET | Refills: 4 | Status: SHIPPED | OUTPATIENT
Start: 2025-06-03 | End: 2026-08-27

## 2025-06-03 RX ORDER — SIMVASTATIN 20 MG
20 TABLET ORAL NIGHTLY
Qty: 90 TABLET | Refills: 4 | Status: SHIPPED | OUTPATIENT
Start: 2025-06-03

## 2025-06-03 RX ORDER — CARVEDILOL 12.5 MG/1
12.5 TABLET ORAL 2 TIMES DAILY
Qty: 180 TABLET | Refills: 4 | Status: SHIPPED | OUTPATIENT
Start: 2025-06-03 | End: 2026-08-27

## 2025-06-03 RX ORDER — VENLAFAXINE HYDROCHLORIDE 150 MG/1
150 CAPSULE, EXTENDED RELEASE ORAL DAILY
Qty: 90 CAPSULE | Refills: 3 | Status: SHIPPED | OUTPATIENT
Start: 2025-06-03 | End: 2026-05-29

## 2025-06-03 RX ORDER — LEVOTHYROXINE SODIUM 125 UG/1
125 TABLET ORAL DAILY
Qty: 90 TABLET | Refills: 4 | Status: SHIPPED | OUTPATIENT
Start: 2025-06-03

## 2025-06-03 NOTE — PROGRESS NOTES
6/3/2025    Shadia Boswell    Chief Complaint   Patient presents with    Medicare AWV       HPI  History was obtained from pt.  Shadia is a 79 y.o. female with a PMHx as listed below   History of Present Illness      Her biggest issue is the fatigue, which she forces herself to go out and be active and walk daily - starts pouring sweat with exercise but no CP.    Prediabetic    Needs fasting labs and thyroid labs    Had been following up with rheumatology        1. Medicare annual wellness visit, subsequent    2. Chronic bilateral low back pain without sciatica    3. Essential hypertension    4. Acquired hypothyroidism    5. Mixed hyperlipidemia    6. Recurrent major depressive disorder, in partial remission    7. Prediabetes           REVIEW OF SYMPTOMS    Review of Systems    PAST MEDICAL HISTORY  Past Medical History:   Diagnosis Date    Bursitis of hip     Cancer (HCC) 1998    ovarian - contained    Colon polyps 09/11/2018    COVID-19 10/11/2021    GERD (gastroesophageal reflux disease)     H/O cardiovascular stress test 09/16/2021    Normal EF 63 % with normal ventricular contractility. No infarct or ischemia noted    H/O Doppler ultrasound [facility] 08/08/2023    No significant evidence of large vessel arterial occlusive disease of the lower extremities.    H/O echocardiogram 09/16/2021    EF is estimated at 55-60%.    Headache     Hepatitis A     Hyperlipidemia     Hypertension     Hypothyroidism     Neuropathy 2020    Obesity     Continuing weight loss efforts    Osteoarthritis 2010    Osteoporosis 2005    Followed ankle reconstruction. Treated with Fosamax and resolved    Ovarian cancer (HCC)     Sjogren's disease     Sjogren's syndrome 2018    Elev JUANJOSE following Hep A    SOB (shortness of breath) 09/09/2021    Viral hepatitis A without hepatic coma 01/31/2018       FAMILY HISTORY  Family History   Problem Relation Age of Onset    Breast Cancer Mother     Colon Cancer Mother     Glaucoma Mother     Cancer Mother

## 2025-06-03 NOTE — PATIENT INSTRUCTIONS
having a problem from this test. If dilating drops are used for a vision test, they may make the eyes sting and cause a medicine taste in the mouth.  Follow-up care is a key part of your treatment and safety. Be sure to make and go to all appointments, and call your doctor if you are having problems. It's also a good idea to know your test results and keep a list of the medicines you take.  Where can you learn more?  Go to https://www.[email or web address].net/patientEd and enter G551 to learn more about \"Learning About Vision Tests.\"  Current as of: July 31, 2024  Content Version: 14.4  © 5204-8425 [employer].   Care instructions adapted under license by [employer]. If you have questions about a medical condition or this instruction, always ask your healthcare professional. [employer], disclaims any warranty or liability for your use of this information.         Eating Healthy Foods: Care Instructions  With every meal, you can make healthy food choices. Try to eat a variety of fruits, vegetables, whole grains, lean proteins, and low-fat dairy products. This can help you get the right balance of nutrients, including vitamins and minerals. Small changes add up over time. You can start by adding one healthy food to your meals each day.    Try to make half your plate fruits and vegetables, one-fourth whole grains, and one-fourth lean proteins. Try including dairy with your meals.   Eat more fruits and vegetables. Try to have them with most meals and snacks.   Foods for healthy eating        Fruits   These can be fresh, frozen, canned, or dried.  Try to choose whole fruit rather than fruit juice.  Eat a variety of colors.        Vegetables   These can be fresh, frozen, canned, or dried.  Beans, peas, and lentils count too.        Whole grains   Choose whole-grain breads, cereals, and noodles.  Try brown rice.        Lean proteins   These can include lean meat, poultry, fish, and eggs.  You can also have

## 2025-06-03 NOTE — PROGRESS NOTES
Medicare Annual Wellness Visit    Shadia Boswell is here for Medicare AWV    Assessment & Plan   Medicare annual wellness visit, subsequent  Chronic bilateral low back pain without sciatica  The following orders have not been finalized:  -     baclofen (LIORESAL) 10 MG tablet  Essential hypertension  The following orders have not been finalized:  -     carvedilol (COREG) 12.5 MG tablet  Acquired hypothyroidism  The following orders have not been finalized:  -     levothyroxine (SYNTHROID) 125 MCG tablet  Mixed hyperlipidemia  The following orders have not been finalized:  -     simvastatin (ZOCOR) 20 MG tablet  Recurrent major depressive disorder, in partial remission  The following orders have not been finalized:  -     venlafaxine (EFFEXOR XR) 150 MG extended release capsule       No follow-ups on file.     Subjective   The following acute and/or chronic problems were also addressed today:  Follow up visit    Patient's complete Health Risk Assessment and screening values have been reviewed and are found in Flowsheets. The following problems were reviewed today and where indicated follow up appointments were made and/or referrals ordered.    Positive Risk Factor Screenings with Interventions:               Poor Eating Habits/Diet:  Do you eat balanced/healthy meals regularly?: (!) (Patient-Rptd) No  Interventions:  See AVS for additional education material    Abnormal BMI (obese):  Body mass index is 38.2 kg/m². (!) Abnormal  Interventions:  See AVS for additional education material          Vision Screen:  Do you have difficulty driving, watching TV, or doing any of your daily activities because of your eyesight?: (Patient-Rptd) No  Have you had an eye exam within the past year?: (!) (Patient-Rptd) No  Interventions:   Encouraged to see eye doctor                    Objective   Vitals:    06/03/25 0920   BP: 138/88   BP Site: Left Upper Arm   Patient Position: Sitting   BP Cuff Size: Large Adult   Pulse: 74

## 2025-06-04 LAB
EST. AVERAGE GLUCOSE BLD GHB EST-MCNC: 119.8 MG/DL
HBA1C MFR BLD: 5.8 %
T4 FREE SERPL-MCNC: 1.3 NG/DL (ref 0.9–1.8)
TSH SERPL DL<=0.005 MIU/L-ACNC: 3.56 UIU/ML (ref 0.27–4.2)

## 2025-06-05 ENCOUNTER — RESULTS FOLLOW-UP (OUTPATIENT)
Dept: FAMILY MEDICINE CLINIC | Age: 80
End: 2025-06-05

## 2025-06-06 ENCOUNTER — TELEPHONE (OUTPATIENT)
Age: 80
End: 2025-06-06

## 2025-06-06 NOTE — TELEPHONE ENCOUNTER
----- Message from Dr. Joes Francisco Anderson MD sent at 6/6/2025  5:34 AM EDT -----  Please notify patient that her labs are consistent with Sjogren's syndrome.  Give her a closer appointment to discuss treatment options.  Thank you

## 2025-06-06 NOTE — TELEPHONE ENCOUNTER
Left message for patient to return call to office for the following:      Jose Francisco Anderson MD  P [facility] Rheumatology Clinical Staff  Please notify patient that her labs are consistent with Sjogren's syndrome.  Give her a closer appointment to discuss treatment options.  Thank you

## 2025-06-17 NOTE — PROGRESS NOTES
RHEUMATOLOGY FOLLOW UP VISIT    2025      Patient Name: Shadia Boswell  : 1945  Medical Record: [id number]      CHIEF COMPLAINT    JUANJOSE positive   Sjogrens syndrome  Osteoporosis   OA affecting multiple joints     Pertinent Problems  Poor Nsaid tolerance manifested by resp distress      HISTORY OF PRESENT ILLNESS    Shadia Boswell is a 79 y.o. female who established on 25. She was diagnosed with sjogrens in 2019 based on positive JUANJOSE following a virulent episode on hep A in 2018. She was experiencing brain fog and extreme fatigue with polyarthralgia. She followed with rheumatologist, Dr Hickman for 2 years and took plaquenil that was dc'd in  because there was no improvement in her symptoms.  She started tumeric in 2024 that helped her joint pain some but she still feels fatigued.       LCV: 25  JUANJOSE 1: 80 +  Anti Ro 60 IgG +   AST 59 >>38H  ALT and ALP normal  CRP 7.8H (ref <5)  ESR normal  WBC, hemoglobin, platelets normal  RF and CCP negative  DEXA scan performed on 2025 shows osteoporosis with major osteoporotic fracture risk 13.3% and hip fracture risk 3.4%  X-ray of bilateral hands shows severe OA in [facility]MC joints.    Today she has bilateral knee pain  S/p bilateral TKR  Bilateral hand pain are her worst pain. There is no swelling   There is morning stiffness lasting 5-10 minutes  Overall Discomfort: 3/10  Improved with: Rest  Worse with: Movement    Dry eyes and mouth: Dry throat   Raynaud's: raynauds in toes, bluish discoloration  Antibodies: JUANJOSE positive    Current rheum meds: none    Past rheum meds: Plaquenil      REVIEW OF SYSTEMS     Constitutional:  Denies fever or chills, decreased appetite, or weight loss   Eyes:  Denies change in visual acuity or eye dryness or irritation  HENT:  Dry throat+   Respiratory:  Denies cough or shortness of breath   Cardiovascular:  Denies chest pain or edema   GI:  Denies abdominal pain, nausea, vomiting, bloody stools or diarrhea   :

## 2025-06-18 ENCOUNTER — OFFICE VISIT (OUTPATIENT)
Age: 80
End: 2025-06-18
Payer: COMMERCIAL

## 2025-06-18 VITALS
BODY MASS INDEX: 38.02 KG/M2 | SYSTOLIC BLOOD PRESSURE: 136 MMHG | DIASTOLIC BLOOD PRESSURE: 78 MMHG | WEIGHT: 265 LBS | HEART RATE: 62 BPM

## 2025-06-18 DIAGNOSIS — M35.00 SJOGREN'S SYNDROME, WITH UNSPECIFIED ORGAN INVOLVEMENT: Primary | ICD-10-CM

## 2025-06-18 DIAGNOSIS — M15.0 PRIMARY OSTEOARTHRITIS INVOLVING MULTIPLE JOINTS: ICD-10-CM

## 2025-06-18 DIAGNOSIS — R53.82 CHRONIC FATIGUE: ICD-10-CM

## 2025-06-18 DIAGNOSIS — R79.82 ELEVATED C-REACTIVE PROTEIN (CRP): ICD-10-CM

## 2025-06-18 DIAGNOSIS — M81.8 OTHER OSTEOPOROSIS WITHOUT CURRENT PATHOLOGICAL FRACTURE: ICD-10-CM

## 2025-06-18 PROCEDURE — 1159F MED LIST DOCD IN RCRD: CPT | Performed by: STUDENT IN AN ORGANIZED HEALTH CARE EDUCATION/TRAINING PROGRAM

## 2025-06-18 PROCEDURE — 3075F SYST BP GE 130 - 139MM HG: CPT | Performed by: STUDENT IN AN ORGANIZED HEALTH CARE EDUCATION/TRAINING PROGRAM

## 2025-06-18 PROCEDURE — 1123F ACP DISCUSS/DSCN MKR DOCD: CPT | Performed by: STUDENT IN AN ORGANIZED HEALTH CARE EDUCATION/TRAINING PROGRAM

## 2025-06-18 PROCEDURE — 3078F DIAST BP <80 MM HG: CPT | Performed by: STUDENT IN AN ORGANIZED HEALTH CARE EDUCATION/TRAINING PROGRAM

## 2025-06-18 PROCEDURE — 99215 OFFICE O/P EST HI 40 MIN: CPT | Performed by: STUDENT IN AN ORGANIZED HEALTH CARE EDUCATION/TRAINING PROGRAM

## 2025-06-18 RX ORDER — ALENDRONATE SODIUM 70 MG/1
70 TABLET ORAL
Qty: 12 TABLET | Refills: 0 | Status: SHIPPED | OUTPATIENT
Start: 2025-06-18

## 2025-06-18 RX ORDER — HYDROXYCHLOROQUINE SULFATE 200 MG/1
200 TABLET, FILM COATED ORAL 2 TIMES DAILY
Qty: 180 TABLET | Refills: 0 | Status: SHIPPED | OUTPATIENT
Start: 2025-06-18

## 2025-06-18 NOTE — PATIENT INSTRUCTIONS
Patient Instructions  Start Plaquenil 1 tab twice daily   Start fosamax 1 tab every 7 days   Repeat CRP one week prior to your next appointment  RTC in 2 months

## 2025-07-30 ENCOUNTER — PATIENT MESSAGE (OUTPATIENT)
Dept: FAMILY MEDICINE CLINIC | Age: 80
End: 2025-07-30

## 2025-07-30 DIAGNOSIS — Z12.31 BREAST CANCER SCREENING BY MAMMOGRAM: Primary | ICD-10-CM

## 2025-08-14 ENCOUNTER — HOSPITAL ENCOUNTER (OUTPATIENT)
Dept: LAB | Age: 80
Discharge: HOME | End: 2025-08-14
Payer: COMMERCIAL

## 2025-08-14 DIAGNOSIS — R79.82 ELEVATED C-REACTIVE PROTEIN (CRP): ICD-10-CM

## 2025-08-14 LAB — CRP SERPL HS-MCNC: 9.2 MG/L (ref 0–5)

## 2025-08-14 PROCEDURE — 86140 C-REACTIVE PROTEIN: CPT

## 2025-08-19 ENCOUNTER — OFFICE VISIT (OUTPATIENT)
Age: 80
End: 2025-08-19
Payer: COMMERCIAL

## 2025-08-19 VITALS
HEART RATE: 62 BPM | OXYGEN SATURATION: 97 % | BODY MASS INDEX: 38.74 KG/M2 | WEIGHT: 270 LBS | SYSTOLIC BLOOD PRESSURE: 140 MMHG | DIASTOLIC BLOOD PRESSURE: 70 MMHG

## 2025-08-19 DIAGNOSIS — Z51.81 ENCOUNTER FOR MONITORING OF HYDROXYCHLOROQUINE THERAPY: ICD-10-CM

## 2025-08-19 DIAGNOSIS — M15.0 PRIMARY OSTEOARTHRITIS INVOLVING MULTIPLE JOINTS: ICD-10-CM

## 2025-08-19 DIAGNOSIS — Z79.899 ENCOUNTER FOR MONITORING OF HYDROXYCHLOROQUINE THERAPY: ICD-10-CM

## 2025-08-19 DIAGNOSIS — Z51.81 ENCOUNTER FOR MONITORING ALENDRONATE THERAPY: ICD-10-CM

## 2025-08-19 DIAGNOSIS — M35.00 SJOGREN'S SYNDROME, WITH UNSPECIFIED ORGAN INVOLVEMENT: Primary | ICD-10-CM

## 2025-08-19 DIAGNOSIS — M81.8 OTHER OSTEOPOROSIS WITHOUT CURRENT PATHOLOGICAL FRACTURE: ICD-10-CM

## 2025-08-19 DIAGNOSIS — R79.82 ELEVATED C-REACTIVE PROTEIN (CRP): ICD-10-CM

## 2025-08-19 DIAGNOSIS — Z79.83 ENCOUNTER FOR MONITORING ALENDRONATE THERAPY: ICD-10-CM

## 2025-08-19 DIAGNOSIS — R53.82 CHRONIC FATIGUE: ICD-10-CM

## 2025-08-19 PROCEDURE — 3078F DIAST BP <80 MM HG: CPT | Performed by: STUDENT IN AN ORGANIZED HEALTH CARE EDUCATION/TRAINING PROGRAM

## 2025-08-19 PROCEDURE — 1123F ACP DISCUSS/DSCN MKR DOCD: CPT | Performed by: STUDENT IN AN ORGANIZED HEALTH CARE EDUCATION/TRAINING PROGRAM

## 2025-08-19 PROCEDURE — 3077F SYST BP >= 140 MM HG: CPT | Performed by: STUDENT IN AN ORGANIZED HEALTH CARE EDUCATION/TRAINING PROGRAM

## 2025-08-19 PROCEDURE — 99215 OFFICE O/P EST HI 40 MIN: CPT | Performed by: STUDENT IN AN ORGANIZED HEALTH CARE EDUCATION/TRAINING PROGRAM

## 2025-08-19 PROCEDURE — 1159F MED LIST DOCD IN RCRD: CPT | Performed by: STUDENT IN AN ORGANIZED HEALTH CARE EDUCATION/TRAINING PROGRAM

## 2025-08-19 RX ORDER — ALENDRONATE SODIUM 70 MG/1
70 TABLET ORAL
Qty: 12 TABLET | Refills: 0 | Status: SHIPPED | OUTPATIENT
Start: 2025-08-19

## 2025-08-19 RX ORDER — HYDROXYCHLOROQUINE SULFATE 200 MG/1
200 TABLET, FILM COATED ORAL 2 TIMES DAILY
Qty: 180 TABLET | Refills: 0 | Status: SHIPPED | OUTPATIENT
Start: 2025-08-19

## 2025-08-31 ENCOUNTER — PATIENT MESSAGE (OUTPATIENT)
Dept: FAMILY MEDICINE CLINIC | Age: 80
End: 2025-08-31

## 2025-08-31 DIAGNOSIS — F33.41 RECURRENT MAJOR DEPRESSIVE DISORDER, IN PARTIAL REMISSION: ICD-10-CM

## 2025-09-02 RX ORDER — VENLAFAXINE HYDROCHLORIDE 75 MG/1
75 CAPSULE, EXTENDED RELEASE ORAL DAILY
Qty: 90 CAPSULE | Refills: 3 | Status: SHIPPED | OUTPATIENT
Start: 2025-09-02 | End: 2026-08-28

## 2025-09-04 ENCOUNTER — HOSPITAL ENCOUNTER (OUTPATIENT)
Dept: WOMENS IMAGING | Age: 80
Discharge: HOME | End: 2025-09-04
Payer: COMMERCIAL

## 2025-09-04 DIAGNOSIS — Z12.31 BREAST CANCER SCREENING BY MAMMOGRAM: ICD-10-CM

## 2025-09-04 PROCEDURE — 77063 BREAST TOMOSYNTHESIS BI: CPT

## (undated) DEVICE — FORCEPS BX L240CM JAW DIA2.8MM L CAP W/ NDL MIC MESH TOOTH

## (undated) DEVICE — SNARE VASC L240CM LOOP W10MM SHTH DIA2.4MM RND STIFF CLD

## (undated) DEVICE — ENDOSCOPY KIT: Brand: MEDLINE INDUSTRIES, INC.